# Patient Record
Sex: FEMALE | Race: BLACK OR AFRICAN AMERICAN | NOT HISPANIC OR LATINO | Employment: OTHER | URBAN - METROPOLITAN AREA
[De-identification: names, ages, dates, MRNs, and addresses within clinical notes are randomized per-mention and may not be internally consistent; named-entity substitution may affect disease eponyms.]

---

## 2017-02-01 ENCOUNTER — RADIANT APPOINTMENT (OUTPATIENT)
Dept: GENERAL RADIOLOGY | Facility: CLINIC | Age: 62
End: 2017-02-01
Attending: INTERNAL MEDICINE
Payer: COMMERCIAL

## 2017-02-01 ENCOUNTER — OFFICE VISIT (OUTPATIENT)
Dept: FAMILY MEDICINE | Facility: CLINIC | Age: 62
End: 2017-02-01
Payer: COMMERCIAL

## 2017-02-01 VITALS
OXYGEN SATURATION: 99 % | RESPIRATION RATE: 20 BRPM | SYSTOLIC BLOOD PRESSURE: 120 MMHG | TEMPERATURE: 98.5 F | WEIGHT: 177 LBS | BODY MASS INDEX: 31.36 KG/M2 | DIASTOLIC BLOOD PRESSURE: 66 MMHG | HEIGHT: 63 IN | HEART RATE: 62 BPM

## 2017-02-01 DIAGNOSIS — J40 BRONCHITIS: ICD-10-CM

## 2017-02-01 DIAGNOSIS — M25.562 PATELLOFEMORAL ARTHRALGIA OF LEFT KNEE: ICD-10-CM

## 2017-02-01 DIAGNOSIS — J40 BRONCHITIS: Primary | ICD-10-CM

## 2017-02-01 PROCEDURE — 99213 OFFICE O/P EST LOW 20 MIN: CPT | Performed by: INTERNAL MEDICINE

## 2017-02-01 PROCEDURE — 71020 XR CHEST 2 VW: CPT

## 2017-02-01 NOTE — MR AVS SNAPSHOT
"              After Visit Summary   2/1/2017    Nanci Levy    MRN: [id number]           Patient Information     Date Of Birth          1955        Visit Information        Provider Department      2/1/2017 3:00 PM Thomas Cormier MD [facility]        Today's Diagnoses     Bronchitis    -  1     Patellofemoral arthralgia of left knee            Follow-ups after your visit        Who to contact     If you have questions or need follow up information about today's clinic visit or your schedule please contact [facility] directly at [phone].  Normal or non-critical lab and imaging results will be communicated to you by MyChart, letter or phone within 4 business days after the clinic has received the results. If you do not hear from us within 7 days, please contact the clinic through MyChart or phone. If you have a critical or abnormal lab result, we will notify you by phone as soon as possible.  Submit refill requests through [employer] or call your pharmacy and they will forward the refill request to us. Please allow 3 business days for your refill to be completed.          Additional Information About Your Visit        Care EveryWhere ID     This is your Care EveryWhere ID. This could be used by other organizations to access your [place] medical records  [id number]        Your Vitals Were     Pulse Temperature Respirations    62 98.5  F (36.9  C) 20    Height BMI (Body Mass Index) Pulse Oximetry    5' 3\" (1.6 m) 31.36 kg/m2 99%    Last Period Breastfeeding?       10/29/1998 No        Blood Pressure from Last 3 Encounters:   02/01/17 120/66   10/26/16 125/80   10/20/16 120/74    Weight from Last 3 Encounters:   02/01/17 177 lb (80.287 kg)   10/26/16 185 lb (83.915 kg)   10/20/16 185 lb (83.915 kg)                 Today's Medication Changes          These changes are accurate as of: 2/1/17  6:17 PM.  If you have any questions, ask your " nurse or doctor.               Stop taking these medicines if you haven't already. Please contact your care team if you have questions.     naproxen 500 MG tablet   Commonly known as:  NAPROSYN   Stopped by:  Thomas Cormier MD                    Primary Care Provider Office Phone # Fax #    Thomas Cormier [phone]       [facility] [address] [facility] [address]        Thank you!     Thank you for choosing [facility]  for your care. Our goal is always to provide you with excellent care. Hearing back from our patients is one way we can continue to improve our services. Please take a few minutes to complete the written survey that you may receive in the mail after your visit with us. Thank you!             Your Updated Medication List - Protect others around you: Learn how to safely use, store and throw away your medicines at www.disposemymeds.org.          This list is accurate as of: 2/1/17  6:17 PM.  Always use your most recent med list.                   Brand Name Dispense Instructions for use    aspirin 81 MG tablet      Take 1 tablet by mouth daily       vitamin D 2000 UNITS tablet     100 tablet    Take 2,000 Units by mouth daily

## 2017-02-01 NOTE — PROGRESS NOTES
SUBJECTIVE:                                                    Nanci Levy is a 61 year old female who presents to clinic today for the following health issues:      Acute Illness   Acute illness concerns: cough  Onset: 1 month     Fever: no     Chills/Sweats: no     Headache (location?): no     Sinus Pressure:no    Conjunctivitis:  no    Ear Pain: no    Rhinorrhea: YES    Congestion: YES    Sore Throat: YES- occ.     Cough: YES    Wheeze: no     Decreased Appetite: YES    Nausea: no     Vomiting: no     Diarrhea:  no     Dysuria/Freq.: no     Fatigue/Achiness: YES    Sick/Strep Exposure: no      Therapies Tried and outcome: [place] and Nyquil prn- not too effective             Concerned by duration of sx; with some mucous production, and some chest soreness with coughing.       Sx wax and wane.                                Also, ongoing L knee pain.  PT was too expensive.     The sports med provider suggested a brace could be tried.                                             Took aleve for a while; has stopped.     Problem list and histories reviewed & adjusted, as indicated.      Current Outpatient Prescriptions   Medication Sig Dispense Refill     Cholecalciferol (VITAMIN D) 2000 UNITS tablet Take 2,000 Units by mouth daily 100 tablet 11     aspirin 81 MG tablet Take 1 tablet by mouth daily       lidocaine (XYLOCAINE) 5 % ointment Apply topically as needed for moderate pain 35 g 3     diclofenac (VOLTAREN) 1 % GEL Apply 4 grams to knees or 2 grams to hands four times daily using enclosed dosing card. 100 g prn     naproxen (NAPROSYN) 500 MG tablet Take 1 tablet (500 mg) by mouth 2 times daily (with meals) 60 tablet 1     No Known Allergies  BP Readings from Last 3 Encounters:   02/01/17 120/66   10/26/16 125/80   10/20/16 120/74    Wt Readings from Last 3 Encounters:   02/01/17 177 lb (80.287 kg)   10/26/16 185 lb (83.915 kg)   10/20/16 185 lb (83.915 kg)                    ROS: See above  "plus  CONSTITUTIONAL:POSITIVE  for fatigue and NEGATIVE  for sweats  ENT/MOUTH: NEGATIVE for sinus pressure  RESP:NEGATIVE for Hx asthma and wheezing    OBJECTIVE:                                                    /66 mmHg  Pulse 62  Temp(Src) 98.5  F (36.9  C)  Resp 20  Ht 5' 3\" (1.6 m)  Wt 177 lb (80.287 kg)  BMI 31.36 kg/m2  SpO2 99%  LMP 10/29/1998  Breastfeeding? No  Body mass index is 31.36 kg/(m^2).  GENERAL APPEARANCE: alert and no distress  HENT: nose and mouth without ulcers or lesions  RESP: lungs clear to auscultation - no rales, rhonchi or wheezes  CV: regular rates and rhythm    Diagnostic test results:  Recent Results (from the past 744 hour(s))   XR Chest 2 Views    Narrative    CHEST TWO VIEWS   2/1/2017 3:28 PM    HISTORY:  Bronchitis.    COMPARISON:  12/16/2009    FINDINGS:  The heart size is normal. No mediastinal pathology is seen.  The lungs are clear. The pulmonary vasculature is normal. No  pneumothorax or pleural effusion is seen.       Impression    IMPRESSION:  Unremarkable chest. I see no definite change since the  previous examination.     CATRACHITO PORTILLO MD          ASSESSMENT/PLAN:                                                        ICD-10-CM    1. Bronchitis J40 XR Chest 2 Views   2. Patellofemoral arthralgia of left knee M25.562        Antibiotics are not indicated.             Expect gradual improvement.  She will call orthopedics to get advice regarding a knee brace.  Follow up with Provider - lexus Cormier MD  [facility]  "

## 2017-02-01 NOTE — NURSING NOTE
"Chief Complaint   Patient presents with     URI       Initial /66 mmHg  Pulse 62  Temp(Src) 98.5  F (36.9  C)  Resp 20  Ht 5' 3\" (1.6 m)  Wt 177 lb (80.287 kg)  BMI 31.36 kg/m2  SpO2 99%  LMP 10/29/1998  Breastfeeding? No Estimated body mass index is 31.36 kg/(m^2) as calculated from the following:    Height as of this encounter: 5' 3\" (1.6 m).    Weight as of this encounter: 177 lb (80.287 kg).  BP completed using cuff size: mamadou Gamboa LPN  "

## 2017-03-30 ENCOUNTER — TELEPHONE (OUTPATIENT)
Dept: FAMILY MEDICINE | Facility: CLINIC | Age: 62
End: 2017-03-30

## 2017-03-30 NOTE — TELEPHONE ENCOUNTER
"I had never heard of the \"osmo patch\".      I learned on the internet that it is an over the counter, \"natural \" remedy and therefore would not be covered by most (all?) insurance plans.       She could certainly try it.         She could also see another orthopedist for a second opinion, and to consider a steroid injection.                   Please let the patient know.    "

## 2017-03-30 NOTE — TELEPHONE ENCOUNTER
"Patient would like PCP's opinion on use of osmo patch. She has tried PT for left knee pain with out relief. She\" can not continue to see them due to cost of PT\". Has seen ortho but not getting pain improvement. She has used knee brace but still has pain and knee swelling. She is now looking for pain relief alternatives. Asking if PCP would order osmo patch or give any other advise. Please review and advice.  "

## 2017-03-30 NOTE — TELEPHONE ENCOUNTER
Called patient to let her know the message below. She is going to check and see if her current insurance plan (she switched plans) covers and get back to us if she needs a referral from the primary to another orthopedic doc.   Cynthia Martinez RN  03/30/17  2:23 PM

## 2017-06-20 ENCOUNTER — RADIANT APPOINTMENT (OUTPATIENT)
Dept: MAMMOGRAPHY | Facility: CLINIC | Age: 62
End: 2017-06-20
Payer: COMMERCIAL

## 2017-06-20 DIAGNOSIS — Z12.31 VISIT FOR SCREENING MAMMOGRAM: ICD-10-CM

## 2017-06-20 PROCEDURE — G0202 SCR MAMMO BI INCL CAD: HCPCS | Mod: TC

## 2017-06-26 ENCOUNTER — OFFICE VISIT (OUTPATIENT)
Dept: FAMILY MEDICINE | Facility: CLINIC | Age: 62
End: 2017-06-26
Payer: COMMERCIAL

## 2017-06-26 VITALS
OXYGEN SATURATION: 99 % | WEIGHT: 174 LBS | HEART RATE: 60 BPM | BODY MASS INDEX: 30.83 KG/M2 | RESPIRATION RATE: 16 BRPM | TEMPERATURE: 97.1 F | HEIGHT: 63 IN | DIASTOLIC BLOOD PRESSURE: 72 MMHG | SYSTOLIC BLOOD PRESSURE: 116 MMHG

## 2017-06-26 DIAGNOSIS — G89.29 CHRONIC PAIN OF LEFT KNEE: ICD-10-CM

## 2017-06-26 DIAGNOSIS — E04.1 THYROID NODULE: ICD-10-CM

## 2017-06-26 DIAGNOSIS — Z00.00 ROUTINE GENERAL MEDICAL EXAMINATION AT A HEALTH CARE FACILITY: Primary | ICD-10-CM

## 2017-06-26 DIAGNOSIS — M25.562 CHRONIC PAIN OF LEFT KNEE: ICD-10-CM

## 2017-06-26 PROCEDURE — 82947 ASSAY GLUCOSE BLOOD QUANT: CPT | Performed by: FAMILY MEDICINE

## 2017-06-26 PROCEDURE — 84443 ASSAY THYROID STIM HORMONE: CPT | Performed by: FAMILY MEDICINE

## 2017-06-26 PROCEDURE — 99396 PREV VISIT EST AGE 40-64: CPT | Performed by: FAMILY MEDICINE

## 2017-06-26 PROCEDURE — 80061 LIPID PANEL: CPT | Performed by: FAMILY MEDICINE

## 2017-06-26 PROCEDURE — 36415 COLL VENOUS BLD VENIPUNCTURE: CPT | Performed by: FAMILY MEDICINE

## 2017-06-26 NOTE — MR AVS SNAPSHOT
After Visit Summary   6/26/2017    Nanci Levy    MRN: [id number]           Patient Information     Date Of Birth          1955        Visit Information        Provider Department      6/26/2017 9:20 AM Hawa Waddell MD [facility]        Today's Diagnoses     Routine general medical examination at a health care facility    -  1    Chronic pain of left knee          Care Instructions    1) You're knee pain unfortunately is not getting better with physical therapy!  This means you need to see a knee specialist - I referred you for this today. You can go back to see Dr. Nielsen, or Dr. Shandra Parish who is great (and not a surgeon), or the [facility] in [place].  I placed a referral for this.      Preventive Health Recommendations  Female Ages 50 - 64    Yearly exam: See your health care provider every year in order to  o Review health changes.   o Discuss preventive care.    o Review your medicines if your doctor has prescribed any.      Get a Pap test every three years (unless you have an abnormal result and your provider advises testing more often).    If you get Pap tests with HPV test, you only need to test every 5 years, unless you have an abnormal result.     You do not need a Pap test if your uterus was removed (hysterectomy) and you have not had cancer.    You should be tested each year for STDs (sexually transmitted diseases) if you're at risk.     Have a mammogram every 1 to 2 years.    Have a colonoscopy at age 50, or have a yearly FIT test (stool test). These exams screen for colon cancer.      Have a cholesterol test every 5 years, or more often if advised.    Have a diabetes test (fasting glucose) every three years. If you are at risk for diabetes, you should have this test more often.     If you are at risk for osteoporosis (brittle bone disease), think about having a bone density scan (DEXA).    Shots: Get a flu shot each year. Get  a tetanus shot every 10 years.    Nutrition:     Eat at least 5 servings of fruits and vegetables each day.    Eat whole-grain bread, whole-wheat pasta and brown rice instead of white grains and rice.    Talk to your provider about Calcium and Vitamin D.     Lifestyle    Exercise at least 150 minutes a week (30 minutes a day, 5 days a week). This will help you control your weight and prevent disease.    Limit alcohol to one drink per day.    No smoking.     Wear sunscreen to prevent skin cancer.     See your dentist every six months for an exam and cleaning.    See your eye doctor every 1 to 2 years.            Follow-ups after your visit        Additional Services     ORTHOPEDICS ADULT REFERRAL       Your provider has referred you to: [facility]: Sports Medicine [facility] [phone]  - Dr Shandra Parish or Dr. Nielsen.  OR [facility]    [email or web address]    Please be aware that coverage of these services is subject to the terms and limitations of your health insurance plan.  Call member services at your health plan with any benefit or coverage questions.      Please bring the following to your appointment:    >>   Any x-rays, CTs or MRIs which have been performed.  Contact the facility where they were done to arrange for  prior to your scheduled appointment.    >>   List of current medications   >>   This referral request   >>   Any documents/labs given to you for this referral                  Who to contact     If you have questions or need follow up information about today's clinic visit or your schedule please contact [facility] directly at [phone].  Normal or non-critical lab and imaging results will be communicated to you by MyChart, letter or phone within 4 business days after the clinic has received the results. If you do not hear from us within 7 days, please contact the clinic through MyChart or phone. If  "you have a critical or abnormal lab result, we will notify you by phone as soon as possible.  Submit refill requests through [employer] or call your pharmacy and they will forward the refill request to us. Please allow 3 business days for your refill to be completed.          Additional Information About Your Visit        [email or web address] Information     [employer] lets you send messages to your doctor, view your test results, renew your prescriptions, schedule appointments and more. To sign up, go to [email or web address] . Click on \"Log in\" on the left side of the screen, which will take you to the Welcome page. Then click on \"Sign up Now\" on the right side of the page.     You will be asked to enter the access code listed below, as well as some personal information. Please follow the directions to create your username and password.     Your access code is: [email or web address]  Expires: 2017  9:51 AM     Your access code will  in 90 days. If you need help or a new code, please call your [place] clinic or [phone].        Care EveryWhere ID     This is your Care EveryWhere ID. This could be used by other organizations to access your [place] medical records  [id number]        Your Vitals Were     Pulse Temperature Respirations Height Last Period Pulse Oximetry    60 97.1  F (36.2  C) (Tympanic) 16 5' 3\" (1.6 m) 10/29/1998 99%    BMI (Body Mass Index)                   30.82 kg/m2            Blood Pressure from Last 3 Encounters:   17 116/72   17 120/66   10/26/16 125/80    Weight from Last 3 Encounters:   17 174 lb (78.9 kg)   17 177 lb (80.3 kg)   10/26/16 185 lb (83.9 kg)              We Performed the Following     ORTHOPEDICS ADULT REFERRAL        Primary Care Provider Office Phone # Fax #    Thomas Cormier [phone]       [facility] [address]  [facility] [address]        Equal Access to Services     MIKE CLARK AH: Juanita soria " Grace, myles watsonadaha, tianata katata fuentes, saul onealin hayaan anniabienvenido zanahalima laThiduane yulisa. So [facility] [phone].    ATENCIÓN: Si raulla lima, tiene a collier disposición servicios gratuitos de asistencia lingüística. Rachid al [phone].    We comply with applicable federal civil rights laws and [place] laws. We do not discriminate on the basis of race, color, national origin, age, disability sex, sexual orientation or gender identity.            Thank you!     Thank you for choosing [facility]  for your care. Our goal is always to provide you with excellent care. Hearing back from our patients is one way we can continue to improve our services. Please take a few minutes to complete the written survey that you may receive in the mail after your visit with us. Thank you!             Your Updated Medication List - Protect others around you: Learn how to safely use, store and throw away your medicines at www.disposemymeds.org.          This list is accurate as of: 6/26/17  9:51 AM.  Always use your most recent med list.                   Brand Name Dispense Instructions for use Diagnosis    aspirin 81 MG tablet      Take 1 tablet by mouth daily        vitamin D 2000 UNITS tablet     100 tablet    Take 2,000 Units by mouth daily    Vitamin D insufficiency

## 2017-06-26 NOTE — LETTER
[facility]  [address]  [address]  [facility] [place] [id number] [phone]                                                                                                           Nanci Myra Levy  [address]  [place] [address]    June 27, 2017      Dear Nanci,    It was nice to see you recently!  I wanted to let you know your recent test results - details of the results can be found below. Briefly:     1. Your lab results look great; everything is normal.  Your cholesterol looks much better than last year, your glucose is nice and low and normal, and your thyroid levels are stable.  All good news!     Let me know if you have any questions about this, or other concerns.     Best,   Hawa Waddell MD   Family Medicine   [facility]   [phone]     Results for orders placed or performed in visit on 06/26/17   -TSH with free T4 reflex        Result                                            Value                         Ref Range                        TSH                                               0.84                          0.40 - 4.00 mU/L           -Lipid panel reflex to direct LDL        Result                                            Value                         Ref Range                        Cholesterol                                       182                           <200 mg/dL                      Triglycerides                                     68                            <150 mg/dL                      HDL Cholesterol                                   63                            >49 mg/dL                        LDL Cholesterol Calculated                        105 (H)                       <100 mg/dL                      Non HDL Cholesterol                               119                           <130 mg/dL                 -Glucose        Result                                             Value                         Ref Range                        Glucose                                           95                            70 - 99 mg/dL

## 2017-06-26 NOTE — PROGRESS NOTES
SUBJECTIVE:     CC: Nanci Levy is an 62 year old woman who presents for preventive health visit.     Healthy Habits:    Do you get at least three servings of calcium containing foods daily (dairy, green leafy vegetables, etc.)? no, taking calcium and/or vitamin D supplement: vitamin d rarely    Amount of exercise or daily activities, outside of work: 3 day(s) per week    Problems taking medications regularly No    Medication side effects: No    Have you had an eye exam in the past two years? yes    Do you see a dentist twice per year? yes    Do you have sleep apnea, excessive snoring or daytime drowsiness?no        Left knee, been hurting for a year and progressively worse.  Had xray which showed patello-femoral arthritis.  Did physical therapy - helps in the moment but then pain comes back.  Can she go to knee specialist?    Also, has thyroid nodules they've been watching.  She feels they are growing; can she get ultrasound?      Today's PHQ-2 Score:   PHQ-2 ( 1999 [employer]) 6/26/2017 2/1/2017   Q1: Little interest or pleasure in doing things 0 0   Q2: Feeling down, depressed or hopeless 0 0   PHQ-2 Score 0 0       Abuse: Current or Past(Physical, Sexual or Emotional)- No  Do you feel safe in your environment - Yes    Social History   Substance Use Topics     Smoking status: Never Smoker     Smokeless tobacco: Never Used     Alcohol use No     The patient does not drink >3 drinks per day nor >7 drinks per week.    Recent Labs   Lab Test  12/28/15   1358  12/22/14   1707  11/25/13   0833   CHOL  210*  194  199   HDL  61  58  54   LDL  130*  115  132*   TRIG  93  105  65   CHOLHDLRATIO   --   3.3  3.7   NHDL  149*   --    --        Reviewed orders with patient.  Reviewed health maintenance and updated orders accordingly - No    Mammo Decision Support:  Patient over age 50, mutual decision to screen reflected in health maintenance.    Pertinent mammograms are reviewed under the imaging tab.  History of  "abnormal Pap smear:   YES - updated in Problem List and Health Maintenance accordingly  Last 3 Pap Results:   PAP (no units)   Date Value   12/28/2015 NIL   12/22/2014 NIL   10/29/2012 NIL       Reviewed and updated as needed this visit by clinical staff  Tobacco  Allergies  Meds  Med Hx  Surg Hx  Fam Hx  Soc Hx        Reviewed and updated as needed this visit by Provider            ROS:  C: NEGATIVE for fever, chills, change in weight  I: NEGATIVE for worrisome rashes, moles or lesions  E: NEGATIVE for vision changes or irritation  ENT: NEGATIVE for ear, mouth and throat problems  R: NEGATIVE for significant cough or SOB  B: NEGATIVE for masses, tenderness or discharge  CV: NEGATIVE for chest pain, palpitations or peripheral edema  GI: NEGATIVE for nausea, abdominal pain, heartburn, or change in bowel habits  : NEGATIVE for unusual urinary or vaginal symptoms. No vaginal bleeding.  M: NEGATIVE for significant arthralgias or myalgia  N: NEGATIVE for weakness, dizziness or paresthesias  P: NEGATIVE for changes in mood or affect     Problem list, Medication list, Allergies, and Medical/Social/Surgical histories reviewed in [facility] and updated as appropriate.  Labs reviewed in EPIC  OBJECTIVE:     /72  Pulse 60  Temp 97.1  F (36.2  C) (Tympanic)  Resp 16  Ht 5' 3\" (1.6 m)  Wt 174 lb (78.9 kg)  LMP 10/29/1998  SpO2 99%  BMI 30.82 kg/m2  EXAM:  GENERAL APPEARANCE: healthy, alert and no distress  EYES: Eyes grossly normal to inspection, PERRL and conjunctivae and sclerae normal  HENT: ear canals and TM's normal, nose and mouth without ulcers or lesions, oropharynx clear and oral mucous membranes moist  NECK: no adenopathy, no asymmetry, masses, or scars and thyroid normal to palpation  RESP: lungs clear to auscultation - no rales, rhonchi or wheezes  BREAST: normal without masses, tenderness or nipple discharge and no palpable axillary masses or adenopathy  CV: regular rate and rhythm, normal S1 S2, no " S3 or S4, no murmur, click or rub, no peripheral edema and peripheral pulses strong  ABDOMEN: soft, nontender, no hepatosplenomegaly, no masses and bowel sounds normal   (female): normal female external genitalia, normal urethral meatus, vaginal mucosal atrophy noted, normal cervix, adnexae, and uterus without masses or abnormal discharge  MS: no musculoskeletal defects are noted and gait is age appropriate without ataxia  SKIN: no suspicious lesions or rashes  NEURO: Normal strength and tone, sensory exam grossly normal, mentation intact and speech normal  PSYCH: mentation appears normal and affect normal/bright    ASSESSMENT/PLAN:     Nanci was seen today for physical.    Diagnoses and all orders for this visit:    Routine general medical examination at a health care facility  -     TSH with free T4 reflex  -     Lipid panel reflex to direct LDL  -     Glucose    Chronic pain of left knee  Comments:  tried PT x 12 months, no improvement and now daily constant pain.  Should see sports med/ortho again.  Referred.  Orders:  -     ORTHOPEDICS ADULT REFERRAL    Thyroid nodule  Comments:  thyroid US in 2014 showed 2 solid nodules smaller in size.  Nanci thinks more noticable; recheck US today.  Orders:  -     US Thyroid; Future          Left knee, been hurting for a year and progressively worse.  Had xray which showed patello-femoral arthritis.  Did physical therapy - helps in the moment but then pain comes back.  Plan:  Referred for sports med/knee clinic.  Should see doctor again as knee pain not improving with physical therapy.    Also, has thyroid nodules they've been watching.  She feels they are growing; can she get ultrasound?  Plan:  Get thyroid ultrasound - last in 2014 showed multinodular goiter with 2 solid nodules - recheck.      COUNSELING:   Reviewed preventive health counseling, as reflected in patient instructions         reports that she has never smoked. She has never used smokeless  "tobacco.    Estimated body mass index is 30.82 kg/(m^2) as calculated from the following:    Height as of this encounter: 5' 3\" (1.6 m).    Weight as of this encounter: 174 lb (78.9 kg).   Weight management plan: Discussed healthy diet and exercise guidelines and patient will follow up in 12 months in clinic to re-evaluate.    Counseling Resources:  ATP IV Guidelines  Pooled Cohorts Equation Calculator  Breast Cancer Risk Calculator  FRAX Risk Assessment  ICSI Preventive Guidelines  Dietary Guidelines for Americans, 2010  USDA's MyPlate  ASA Prophylaxis  Lung CA Screening    Hawa Waddell MD  [facility]  "

## 2017-06-26 NOTE — PATIENT INSTRUCTIONS
1) You're knee pain unfortunately is not getting better with physical therapy!  This means you need to see a knee specialist - I referred you for this today. You can go back to see Dr. Nielsen, or Dr. Shandra Parish who is great (and not a surgeon), or the [facility] in [place].  I placed a referral for this.      Preventive Health Recommendations  Female Ages 50 - 64    Yearly exam: See your health care provider every year in order to  o Review health changes.   o Discuss preventive care.    o Review your medicines if your doctor has prescribed any.      Get a Pap test every three years (unless you have an abnormal result and your provider advises testing more often).    If you get Pap tests with HPV test, you only need to test every 5 years, unless you have an abnormal result.     You do not need a Pap test if your uterus was removed (hysterectomy) and you have not had cancer.    You should be tested each year for STDs (sexually transmitted diseases) if you're at risk.     Have a mammogram every 1 to 2 years.    Have a colonoscopy at age 50, or have a yearly FIT test (stool test). These exams screen for colon cancer.      Have a cholesterol test every 5 years, or more often if advised.    Have a diabetes test (fasting glucose) every three years. If you are at risk for diabetes, you should have this test more often.     If you are at risk for osteoporosis (brittle bone disease), think about having a bone density scan (DEXA).    Shots: Get a flu shot each year. Get a tetanus shot every 10 years.    Nutrition:     Eat at least 5 servings of fruits and vegetables each day.    Eat whole-grain bread, whole-wheat pasta and brown rice instead of white grains and rice.    Talk to your provider about Calcium and Vitamin D.     Lifestyle    Exercise at least 150 minutes a week (30 minutes a day, 5 days a week). This will help you control your weight and prevent disease.    Limit alcohol to one drink per day.    No smoking.      Wear sunscreen to prevent skin cancer.     See your dentist every six months for an exam and cleaning.    See your eye doctor every 1 to 2 years.

## 2017-06-26 NOTE — NURSING NOTE
"Chief Complaint   Patient presents with     Physical       Initial /72  Pulse 60  Temp 97.1  F (36.2  C) (Tympanic)  Resp 16  Ht 5' 3\" (1.6 m)  Wt 174 lb (78.9 kg)  LMP 10/29/1998  SpO2 99%  BMI 30.82 kg/m2 Estimated body mass index is 30.82 kg/(m^2) as calculated from the following:    Height as of this encounter: 5' 3\" (1.6 m).    Weight as of this encounter: 174 lb (78.9 kg).  Medication Reconciliation: complete   Rowena Larios CMA    "

## 2017-06-27 LAB
CHOLEST SERPL-MCNC: 182 MG/DL
GLUCOSE SERPL-MCNC: 95 MG/DL (ref 70–99)
HDLC SERPL-MCNC: 63 MG/DL
LDLC SERPL CALC-MCNC: 105 MG/DL
NONHDLC SERPL-MCNC: 119 MG/DL
TRIGL SERPL-MCNC: 68 MG/DL
TSH SERPL DL<=0.005 MIU/L-ACNC: 0.84 MU/L (ref 0.4–4)

## 2017-06-27 NOTE — PROGRESS NOTES
Hi Team 3:  Please sent a lab result with the following note.  Thank you!    Dear Nanci,    It was nice to see you recently!  I wanted to let you know your recent test results - details of the results can be found below. Briefly:    1. Your lab results look great; everything is normal.  Your cholesterol looks much better than last year, your glucose is nice and low and normal, and your thyroid levels are stable.  All good news!    Let me know if you have any questions about this, or other concerns.    Best,  Hawa Waddell MD  Family Medicine  [facility]  [phone]         Results for orders placed or performed in visit on 06/26/17  -TSH with free T4 reflex       Result                                            Value                         Ref Range                       TSH                                               0.84                          0.40 - 4.00 mU/L           -Lipid panel reflex to direct LDL       Result                                            Value                         Ref Range                       Cholesterol                                       182                           <200 mg/dL                      Triglycerides                                     68                            <150 mg/dL                      HDL Cholesterol                                   63                            >49 mg/dL                       LDL Cholesterol Calculated                        105 (H)                       <100 mg/dL                      Non HDL Cholesterol                               119                           <130 mg/dL                 -Glucose       Result                                            Value                         Ref Range                       Glucose                                           95                            70 - 99 mg/dL

## 2018-02-06 ENCOUNTER — OFFICE VISIT (OUTPATIENT)
Dept: FAMILY MEDICINE | Facility: CLINIC | Age: 63
End: 2018-02-06
Payer: COMMERCIAL

## 2018-02-06 ENCOUNTER — HOSPITAL ENCOUNTER (OUTPATIENT)
Dept: MRI IMAGING | Facility: CLINIC | Age: 63
Discharge: HOME | End: 2018-02-06
Attending: INTERNAL MEDICINE | Admitting: INTERNAL MEDICINE
Payer: COMMERCIAL

## 2018-02-06 VITALS
RESPIRATION RATE: 20 BRPM | TEMPERATURE: 97.3 F | BODY MASS INDEX: 33.31 KG/M2 | HEART RATE: 80 BPM | HEIGHT: 63 IN | WEIGHT: 188 LBS | OXYGEN SATURATION: 100 % | DIASTOLIC BLOOD PRESSURE: 68 MMHG | SYSTOLIC BLOOD PRESSURE: 126 MMHG

## 2018-02-06 DIAGNOSIS — J06.9 UPPER RESPIRATORY TRACT INFECTION, UNSPECIFIED TYPE: ICD-10-CM

## 2018-02-06 DIAGNOSIS — R20.0 LEFT FACIAL NUMBNESS: ICD-10-CM

## 2018-02-06 DIAGNOSIS — R20.0 LEFT FACIAL NUMBNESS: Primary | ICD-10-CM

## 2018-02-06 DIAGNOSIS — R20.2 PARESTHESIAS: ICD-10-CM

## 2018-02-06 PROCEDURE — 25000128 H RX IP 250 OP 636: Performed by: INTERNAL MEDICINE

## 2018-02-06 PROCEDURE — 99214 OFFICE O/P EST MOD 30 MIN: CPT | Performed by: INTERNAL MEDICINE

## 2018-02-06 PROCEDURE — A9585 GADOBUTROL INJECTION: HCPCS | Performed by: INTERNAL MEDICINE

## 2018-02-06 PROCEDURE — 70553 MRI BRAIN STEM W/O & W/DYE: CPT

## 2018-02-06 RX ORDER — GADOBUTROL 604.72 MG/ML
8 INJECTION INTRAVENOUS ONCE
Status: COMPLETED | OUTPATIENT
Start: 2018-02-06 | End: 2018-02-06

## 2018-02-06 RX ADMIN — GADOBUTROL 8 ML: 604.72 INJECTION INTRAVENOUS at 20:15

## 2018-02-06 NOTE — PATIENT INSTRUCTIONS
Let's plan an MRI of your brain today.                                        The phone number is  [phone]

## 2018-02-06 NOTE — PROGRESS NOTES
"  SUBJECTIVE:   Nanci Levy is a 62 year old female who presents to clinic today for the following health issues:      Acute Illness   Acute illness concerns: sinus issue  Onset: past 24 hours    Fever: no     Chills/Sweats: no     Headache (location?): YES    Sinus Pressure:YES    Conjunctivitis:  no    Ear Pain: no    Rhinorrhea: no     Congestion: no     Sore Throat: YES     Cough: yes    Wheeze: no     Decreased Appetite: no     Nausea: no     Vomiting: no     Diarrhea:  no     Dysuria/Freq.: no     Fatigue/Achiness: no     Sick/Strep Exposure: no      Therapies Tried and outcome: nothing                 Recent URI sx; now the L side of her face feels \"tight\".               Also, her left side (arm and leg) feel \" different\".                 She has difficulty being real specific with her left sided symptoms.     She is not really describing weakness, just that she feels \"different\" on her left side.                    She has a variety of low-grade URI symptoms.  She takes an aspirin tablet daily.                Problem list and histories reviewed & adjusted, as indicated.      Patient Active Problem List    Diagnosis Date Noted     Cervical high risk HPV (human papillomavirus) test positive 12/01/2014     Priority: High     12/2014: nil pap, + HPV (not 16 or 18). Plan cotest pap & HPV in 1 year.  Tracking started.  12/28/15: NIL pap, neg HPV. Plan cotest pap & HPV in 3 years.           Chronic pain of left knee 06/26/2017     Priority: Medium     tried PT x 12 months, no improvement and now daily constant pain.  Should see sports med/ortho again.  Referred.       Thyroid nodule 06/26/2017     Priority: Medium     thyroid US in 2014 showed 2 solid nodules smaller in size.  Nanci thinks more noticable; recheck US today.       Bursitis, knee, left 03/31/2016     Priority: Medium     X-ray indicates patellofemoral arthritis       Paresthesia of left foot 03/31/2016     Priority: Medium     " "Hyperlipidemia with target LDL less than 130 12/08/2014     Priority: Medium     Diagnosis updated by automated process. Provider to review and confirm.       Sciatica 11/25/2013     Priority: Medium     Left side; 1/13; also see ortho note 2006; had an L5-S1 osteophyte/disc complex with moderate to severe foraminal stenosis L        Chest wall pain      Priority: Medium     see cardiology note; MRI heart nml                 CXR 2/11 normal       Multinodular goiter (nontoxic)      Priority: Medium     bx non-Dx; watery colloid ; not enough cells.         Had a 1 cm nodule in each lobe; bx in 2009; \"in all likelihood benign\" ;    US in 2/14; nodules are smaller       Vitamin D deficiency 10/29/2012     Priority: Medium     17 in 11/13; resume vit D  Problem list name updated by automated process. Provider to review       Family history of diabetes mellitus 10/29/2012     Priority: Medium     Arthralgia of temporomandibular joint 10/29/2012     Priority: Medium     left       Anemia 10/29/2012     Priority: Medium     12.7 with ferritin 41 in 10/12; add Fe with each blood donation;                           12.1 and 77 in 11/13, 12.1 and 56 in 12/14  Problem list name updated by automated process. Provider to review       Hearing loss 10/29/2012     Priority: Medium     left  Problem list name updated by automated process. Provider to review       ACP (advance care planning) 09/02/2015     Priority: Low     Advance Care Planning 9/2/2015: ACP Review and Resources Provided:  Reviewed chart for advance care plan.  Nanci Levy has no plan or code status on file. Discussed available resources and provided with information. . Added by Nava Wilson               Preventive measure 10/30/2012     Priority: Low     APRIMA DATA BASE UNDER THE 1/21/13 NOTE  Colonoscopy 7/03, 2/14, neg, recheck in 10 yrs;                           Mammogram 12/13;2/15 ,6/17                          Pap 10/12;12/14                    " "           plan DXA age 63       Past Surgical History:   Procedure Laterality Date     HYSTERECTOMY  08/1997    supra cervical abdominal hysterectomy; ovaries remain     Social History     Social History     Marital status: Single     Spouse name: N/A     Number of children: N/A     Years of education: N/A     Occupational History     Not on file.     Social History Main Topics     Smoking status: Never Smoker     Smokeless tobacco: Never Used     Alcohol use No     Drug use: No     Sexual activity: Yes     Partners: Male     Other Topics Concern     Parent/Sibling W/ Cabg, Mi Or Angioplasty Before 65f 55m? Yes     Social History Narrative       Current Outpatient Prescriptions   Medication Sig Dispense Refill     Cholecalciferol (VITAMIN D) 2000 UNITS tablet Take 2,000 Units by mouth daily 100 tablet 11     aspirin 81 MG tablet Take 1 tablet by mouth daily       No Known Allergies  BP Readings from Last 3 Encounters:   02/06/18 126/68   06/26/17 116/72   02/01/17 120/66    Wt Readings from Last 3 Encounters:   02/06/18 188 lb (85.3 kg)   06/26/17 174 lb (78.9 kg)   02/01/17 177 lb (80.3 kg)                    Reviewed and updated as needed this visit by clinical staff       Reviewed and updated as needed this visit by Provider         ROS:  CONSTITUTIONAL:NEGATIVE for fever, chills, change in weight  EYES: NEGATIVE for vision changes or irritation  ENT/MOUTH: POSITIVE for postnasal drainage and snoring  RESP:POSITIVE for cough-non productive  CV: NEGATIVE for chest pain, palpitations or peripheral edema  MUSCULOSKELETAL: POSITIVE  for arthralgias left knee, chronic  NEURO: POSITIVE for left face and left arm and left leg all of which feel \"different\" to her and NEGATIVE for dizziness/lightheadedness, dysarthria, dysphagia, HX CVA, HX TIA, gait disturbance, memory problems, weakness  and visual change     OBJECTIVE:                                                    /68 (BP Location: Right arm, Patient " "Position: Chair, Cuff Size: Adult Large)  Pulse 80  Temp 97.3  F (36.3  C)  Resp 20  Ht 5' 3\" (1.6 m)  Wt 188 lb (85.3 kg)  LMP 10/29/1998  SpO2 100%  Breastfeeding? No  BMI 33.3 kg/m2  Body mass index is 33.3 kg/(m^2).  GENERAL APPEARANCE: alert and no distress  HENT: ear canals and TM's normal and nose and mouth without ulcers or lesions  NECK: no adenopathy, no asymmetry, masses, or scars and thyroid nodule   RESP: lungs clear to auscultation - no rales, rhonchi or wheezes  CV: regular rates and rhythm, normal S1 S2, no S3 or S4 and no murmur, click or rub  NEURO: Normal strength and tone, mentation intact, speech normal, DTR symmetrically normal in upper and lower extremities, gait normal  and cranial nerves 2-12 intact    Diagnostic test results:  none      ASSESSMENT/PLAN:                                                        ICD-10-CM    1. Left facial numbness R20.0 MR Brain w/o & w Contrast   2. Paresthesias R20.2 MR Brain w/o & w Contrast    left side   3. Upper respiratory tract infection, unspecified type J06.9        Summary and implications:  I cannot explain her left-sided symptoms.           She has had a URI, but those symptoms have been minimal.                    I cannot rule out a possible subtle stroke.  MRI brain advised.  Patient Instructions   Let's plan an MRI of your brain today.                                        The phone number is  [phone]      Thomas Cormier MD  [facility]                      Recent Results (from the past 24 hour(s))   MR Brain w/o & w Contrast    Narrative    MRI BRAIN WITHOUT AND WITH CONTRAST  2/6/2018 8:11 PM    HISTORY:  Subtle right hemispheric symptoms. Left facial numbness.  Paresthesias.    TECHNIQUE:  Multiplanar, multisequence MRI of the brain without and  with 8 mL Gadavist.    COMPARISON: None.    FINDINGS:  There are a few tiny foci of T2 hyperintensity in the  subcortical region of each cerebral " hemisphere, likely minimal chronic  small vessel ischemic change.  There is no evidence of hemorrhage,  mass, acute infarct, or anomaly.  There are no gadolinium enhancing  lesions.    The facial structures appear normal. The arteries at the base of the  brain and the dural venous sinuses appear patent.       Impression    IMPRESSION:  1. Minimal nonspecific chronic white matter disease.  2. Nothing acute.      ALVELL GRACE MD                   Phone:  Sx are better.     RTP

## 2018-02-06 NOTE — MR AVS SNAPSHOT
"              After Visit Summary   2/6/2018    Nanci Levy    MRN: [id number]           Patient Information     Date Of Birth          1955        Visit Information        Provider Department      2/6/2018 8:00 AM Thomas Cormier MD [facility]        Today's Diagnoses     Left facial numbness    -  1    Paresthesias          Care Instructions    Let's plan an MRI of your brain today.                                        The phone number is  [phone]          Follow-ups after your visit        Future tests that were ordered for you today     Open Future Orders        Priority Expected Expires Ordered    MR Brain w/o & w Contrast Routine  2/6/2019 2/6/2018            Who to contact     If you have questions or need follow up information about today's clinic visit or your schedule please contact [facility] directly at [phone].  Normal or non-critical lab and imaging results will be communicated to you by MyChart, letter or phone within 4 business days after the clinic has received the results. If you do not hear from us within 7 days, please contact the clinic through MyChart or phone. If you have a critical or abnormal lab result, we will notify you by phone as soon as possible.  Submit refill requests through [employer] or call your pharmacy and they will forward the refill request to us. Please allow 3 business days for your refill to be completed.          Additional Information About Your Visit        MyChart Information     [employer] lets you send messages to your doctor, view your test results, renew your prescriptions, schedule appointments and more. To sign up, go to [email or web address] . Click on \"Log in\" on the left side of the screen, which will take you to the Welcome page. Then click on \"Sign up Now\" on the right side of the page.     You will be asked to enter the access code listed below, as well as some personal " "information. Please follow the directions to create your username and password.     Your access code is: [email or web address]  Expires: 2018  8:42 AM     Your access code will  in 90 days. If you need help or a new code, please call your [facility] clinic or [phone].        Care EveryWhere ID     This is your Care EveryWhere ID. This could be used by other organizations to access your [facility] medical records  [id number]        Your Vitals Were     Pulse Temperature Respirations Height Last Period Pulse Oximetry    80 97.3  F (36.3  C) 20 5' 3\" (1.6 m) 10/29/1998 100%    Breastfeeding? BMI (Body Mass Index)                No 33.3 kg/m2           Blood Pressure from Last 3 Encounters:   18 126/68   17 116/72   17 120/66    Weight from Last 3 Encounters:   18 188 lb (85.3 kg)   17 174 lb (78.9 kg)   17 177 lb (80.3 kg)               Primary Care Provider Office Phone # Fax #    Thomas Cormier [phone]       [address] [facility] [address]        Equal Access to Services     MIKE CLARK AH: Hadii abe ku hadasho Soomaali, waaxda luqadaha, qaybta kaalmada adeegyada, waxay idiin haybettyn johnny patel . So [facility] [phone].    ATENCIÓN: Si habla español, tiene a collier disposición servicios gratuitos de asistencia lingüística. Llame al [phone].    We comply with applicable federal civil rights laws and [place] laws. We do not discriminate on the basis of race, color, national origin, age, disability, sex, sexual orientation, or gender identity.            Thank you!     Thank you for choosing [facility]  for your care. Our goal is always to provide you with excellent care. Hearing back from our patients is one way we can continue to improve our services. Please take a few minutes to complete the written survey that you may receive in the mail after your visit with us. Thank you!             Your Updated " Medication List - Protect others around you: Learn how to safely use, store and throw away your medicines at www.disposemymeds.org.          This list is accurate as of 2/6/18  8:42 AM.  Always use your most recent med list.                   Brand Name Dispense Instructions for use Diagnosis    aspirin 81 MG tablet      Take 1 tablet by mouth daily        vitamin D 2000 UNITS tablet     100 tablet    Take 2,000 Units by mouth daily    Vitamin D insufficiency

## 2018-02-06 NOTE — NURSING NOTE
"Chief Complaint   Patient presents with     Sinus Problem       Initial /68 (BP Location: Right arm, Patient Position: Chair, Cuff Size: Adult Large)  Pulse 80  Temp 97.3  F (36.3  C)  Resp 20  Ht 5' 3\" (1.6 m)  Wt 188 lb (85.3 kg)  LMP 10/29/1998  SpO2 100%  Breastfeeding? No  BMI 33.3 kg/m2 Estimated body mass index is 33.3 kg/(m^2) as calculated from the following:    Height as of this encounter: 5' 3\" (1.6 m).    Weight as of this encounter: 188 lb (85.3 kg).  Medication Reconciliation: complete   Emperatriz Gamboa LPN  "

## 2018-09-10 ENCOUNTER — OFFICE VISIT (OUTPATIENT)
Dept: FAMILY MEDICINE | Facility: CLINIC | Age: 63
End: 2018-09-10
Payer: COMMERCIAL

## 2018-09-10 VITALS
HEIGHT: 65 IN | BODY MASS INDEX: 32.49 KG/M2 | TEMPERATURE: 98.5 F | OXYGEN SATURATION: 99 % | SYSTOLIC BLOOD PRESSURE: 120 MMHG | DIASTOLIC BLOOD PRESSURE: 62 MMHG | HEART RATE: 64 BPM | RESPIRATION RATE: 20 BRPM | WEIGHT: 195 LBS

## 2018-09-10 DIAGNOSIS — R60.0 BILATERAL LEG EDEMA: ICD-10-CM

## 2018-09-10 DIAGNOSIS — Z00.00 ROUTINE GENERAL MEDICAL EXAMINATION AT A HEALTH CARE FACILITY: Primary | ICD-10-CM

## 2018-09-10 DIAGNOSIS — Z12.4 SCREENING FOR MALIGNANT NEOPLASM OF CERVIX: ICD-10-CM

## 2018-09-10 DIAGNOSIS — E78.5 HYPERLIPIDEMIA WITH TARGET LDL LESS THAN 130: ICD-10-CM

## 2018-09-10 DIAGNOSIS — Z83.3 FAMILY HISTORY OF DIABETES MELLITUS: ICD-10-CM

## 2018-09-10 DIAGNOSIS — E04.2 MULTINODULAR GOITER (NONTOXIC): ICD-10-CM

## 2018-09-10 DIAGNOSIS — D64.9 ANEMIA, UNSPECIFIED TYPE: ICD-10-CM

## 2018-09-10 DIAGNOSIS — Z23 NEED FOR PROPHYLACTIC VACCINATION AGAINST STREPTOCOCCUS PNEUMONIAE (PNEUMOCOCCUS): ICD-10-CM

## 2018-09-10 DIAGNOSIS — E55.9 VITAMIN D DEFICIENCY: ICD-10-CM

## 2018-09-10 DIAGNOSIS — Z78.0 ASYMPTOMATIC POSTMENOPAUSAL STATUS: ICD-10-CM

## 2018-09-10 LAB
BASOPHILS # BLD AUTO: 0 10E9/L (ref 0–0.2)
BASOPHILS NFR BLD AUTO: 0.3 %
DIFFERENTIAL METHOD BLD: NORMAL
EOSINOPHIL # BLD AUTO: 0.2 10E9/L (ref 0–0.7)
EOSINOPHIL NFR BLD AUTO: 2.3 %
ERYTHROCYTE [DISTWIDTH] IN BLOOD BY AUTOMATED COUNT: 12.8 % (ref 10–15)
HBA1C MFR BLD: 5.6 % (ref 0–5.6)
HCT VFR BLD AUTO: 39.1 % (ref 35–47)
HGB BLD-MCNC: 12.5 G/DL (ref 11.7–15.7)
LYMPHOCYTES # BLD AUTO: 2.6 10E9/L (ref 0.8–5.3)
LYMPHOCYTES NFR BLD AUTO: 36.4 %
MCH RBC QN AUTO: 30.9 PG (ref 26.5–33)
MCHC RBC AUTO-ENTMCNC: 32 G/DL (ref 31.5–36.5)
MCV RBC AUTO: 97 FL (ref 78–100)
MONOCYTES # BLD AUTO: 0.6 10E9/L (ref 0–1.3)
MONOCYTES NFR BLD AUTO: 8.3 %
NEUTROPHILS # BLD AUTO: 3.8 10E9/L (ref 1.6–8.3)
NEUTROPHILS NFR BLD AUTO: 52.7 %
PLATELET # BLD AUTO: 232 10E9/L (ref 150–450)
RBC # BLD AUTO: 4.05 10E12/L (ref 3.8–5.2)
WBC # BLD AUTO: 7.1 10E9/L (ref 4–11)

## 2018-09-10 PROCEDURE — 87624 HPV HI-RISK TYP POOLED RSLT: CPT | Performed by: INTERNAL MEDICINE

## 2018-09-10 PROCEDURE — 82728 ASSAY OF FERRITIN: CPT | Performed by: INTERNAL MEDICINE

## 2018-09-10 PROCEDURE — G0402 INITIAL PREVENTIVE EXAM: HCPCS | Performed by: INTERNAL MEDICINE

## 2018-09-10 PROCEDURE — 80061 LIPID PANEL: CPT | Performed by: INTERNAL MEDICINE

## 2018-09-10 PROCEDURE — 36415 COLL VENOUS BLD VENIPUNCTURE: CPT | Performed by: INTERNAL MEDICINE

## 2018-09-10 PROCEDURE — G0145 SCR C/V CYTO,THINLAYER,RESCR: HCPCS | Performed by: INTERNAL MEDICINE

## 2018-09-10 PROCEDURE — G0009 ADMIN PNEUMOCOCCAL VACCINE: HCPCS | Performed by: INTERNAL MEDICINE

## 2018-09-10 PROCEDURE — 80053 COMPREHEN METABOLIC PANEL: CPT | Performed by: INTERNAL MEDICINE

## 2018-09-10 PROCEDURE — 90670 PCV13 VACCINE IM: CPT | Performed by: INTERNAL MEDICINE

## 2018-09-10 PROCEDURE — 83036 HEMOGLOBIN GLYCOSYLATED A1C: CPT | Performed by: INTERNAL MEDICINE

## 2018-09-10 PROCEDURE — 84443 ASSAY THYROID STIM HORMONE: CPT | Performed by: INTERNAL MEDICINE

## 2018-09-10 PROCEDURE — 82306 VITAMIN D 25 HYDROXY: CPT | Performed by: INTERNAL MEDICINE

## 2018-09-10 PROCEDURE — 85025 COMPLETE CBC W/AUTO DIFF WBC: CPT | Performed by: INTERNAL MEDICINE

## 2018-09-10 ASSESSMENT — ACTIVITIES OF DAILY LIVING (ADL)
CURRENT_FUNCTION: NO ASSISTANCE NEEDED
I_NEED_ASSISTANCE_FOR_THE_FOLLOWING_DAILY_ACTIVITIES:: NO ASSISTANCE IS NEEDED

## 2018-09-10 NOTE — MR AVS SNAPSHOT
After Visit Summary   9/10/2018    Nanci Levy    MRN: [id number]           Patient Information     Date Of Birth          1955        Visit Information        Provider Department      9/10/2018 4:00 PM Thomas Cormier MD [facility]        Today's Diagnoses     Routine general medical examination at a health care facility    -  1    Bilateral leg edema        Multinodular goiter (nontoxic)        Vitamin D deficiency        Hyperlipidemia with target LDL less than 130        Family history of diabetes mellitus        Need for prophylactic vaccination against Streptococcus pneumoniae (pneumococcus)        Asymptomatic postmenopausal status        Screening for malignant neoplasm of cervix          Care Instructions    I can order a diuretic once I get your lab results.             Meanwhile,cut back on salty foods and elevate your legs.          I will let you know your lab results.                      You should schedule a bone density test at the [facility] office.        Call for an appointment.              Follow-ups after your visit        Future tests that were ordered for you today     Open Future Orders        Priority Expected Expires Ordered    DEXA HIP/PELVIS/SPINE - Future Routine  9/10/2019 9/10/2018            Who to contact     If you have questions or need follow up information about today's clinic visit or your schedule please contact [facility] directly at [phone].  Normal or non-critical lab and imaging results will be communicated to you by MyChart, letter or phone within 4 business days after the clinic has received the results. If you do not hear from us within 7 days, please contact the clinic through MyChart or phone. If you have a critical or abnormal lab result, we will notify you by phone as soon as possible.  Submit refill requests through [employer] or call your pharmacy and they  "will forward the refill request to us. Please allow 3 business days for your refill to be completed.          Additional Information About Your Visit        Care EveryWhere ID     This is your Care EveryWhere ID. This could be used by other organizations to access your [facility] medical records  [id number]        Your Vitals Were     Pulse Temperature Respirations Height Last Period Pulse Oximetry    64 98.5  F (36.9  C) 20 5' 5\" (1.651 m) 10/29/1998 99%    Breastfeeding? BMI (Body Mass Index)                No 32.45 kg/m2           Blood Pressure from Last 3 Encounters:   09/10/18 120/62   02/06/18 126/68   06/26/17 116/72    Weight from Last 3 Encounters:   09/10/18 195 lb (88.5 kg)   02/06/18 188 lb (85.3 kg)   06/26/17 174 lb (78.9 kg)              We Performed the Following     CBC with platelets and differential     Comprehensive metabolic panel (BMP + Alb, Alk Phos, ALT, AST, Total. Bili, TP)     Hemoglobin A1c     Lipid Profile (Chol, Trig, HDL, LDL calc)     Pap imaged thin layer screen with HPV - recommended age 30 - 65 years (select HPV order below)     TSH with free T4 reflex     Vitamin D Deficiency        Primary Care Provider Office Phone # Fax #    Thomas Cormier [phone] [address] [facility] [address]        Equal Access to Services     MIKE CLARK : Hadii aad ku hadasho Soomaali, waaxda luqadaha, qaybta kaalmada johnnyyada, saul márquez. So [facility] [phone].    ATENCIÓN: Si habla español, tiene a collier disposición servicios gratuitos de asistencia lingüística. Rachid al [phone].    We comply with applicable federal civil rights laws and [place] laws. We do not discriminate on the basis of race, color, national origin, age, disability, sex, sexual orientation, or gender identity.            Thank you!     Thank you for choosing [facility]  for your care. Our goal is always to provide you " with excellent care. Hearing back from our patients is one way we can continue to improve our services. Please take a few minutes to complete the written survey that you may receive in the mail after your visit with us. Thank you!             Your Updated Medication List - Protect others around you: Learn how to safely use, store and throw away your medicines at www.disposemymeds.org.          This list is accurate as of 9/10/18  4:58 PM.  Always use your most recent med list.                   Brand Name Dispense Instructions for use Diagnosis    aspirin 81 MG tablet      Take 1 tablet by mouth daily        vitamin D 2000 units tablet     100 tablet    Take 2,000 Units by mouth daily    Vitamin D insufficiency

## 2018-09-10 NOTE — PROGRESS NOTES
"  SUBJECTIVE:   Nanci Levy is a 63 year old female who presents to clinic today for the following health issues:  {Provider please address medication reconciliation discrepancies--rooming staff please delete if no med/rec issues}    {Superlists:133505}    {additional problems for provider to add:108423}    Problem list and histories reviewed & adjusted, as indicated.  Additional history: {NONE - AS DOCUMENTED:517546::\"as documented\"}    {HIST REVIEW/ LINKS 2:198660}    Reviewed and updated as needed this visit by clinical staff  Tobacco  Allergies  Meds  Med Hx  Surg Hx  Fam Hx  Soc Hx      Reviewed and updated as needed this visit by Provider         {PROVIDER CHARTING PREFERENCE:585928}  "

## 2018-09-10 NOTE — PROGRESS NOTES
SUBJECTIVE:   Nanci Levy is a 63 year old female who presents for Preventive Visit.      Are you in the first 12 months of your Medicare coverage?  Yes,  Visual Acuity:  Right Eye: pass   Left Eye: pass- Both Eyes:pas/ using glasses all the time, and they are bifocals    Physical   Annual:     Getting at least 3 servings of Calcium per day:  Yes    Bi-annual eye exam:  Yes    Dental care twice a year:  Yes    Sleep apnea or symptoms of sleep apnea:  Daytime drowsiness    Diet:  Regular (no restrictions)    Taking medications regularly:  Yes    Medication side effects:  Not applicable    Additional concerns today:  YES    Ability to successfully perform activities of daily living: no assistance needed    Home Safety:  No safety concerns identified    Hearing Impairment: no hearing concerns        Fall risk:     Low risk- no falls within last year  COGNITIVE SCREEN  1) Repeat 3 items (Leader, Season, Table)    2) Clock draw: NORMAL  3) 3 item recall: Recalls 3 objects  Results: 3 items recalled: COGNITIVE IMPAIRMENT LESS LIKELY    Mini-CogTM Copyright S [employer]. Licensed by the author for use in [facility]; reprinted with permission (vinicio@.[email or web address]). All rights reserved.        Reviewed and updated as needed this visit by clinical staff  Tobacco  Allergies  Meds  Med Hx  Surg Hx  Fam Hx  Soc Hx        Reviewed and updated as needed this visit by Provider            Alcohol Use 9/10/2018   If you drink alcohol do you typically have greater than 3 drinks per day OR greater than 7 drinks per week? Not Applicable           C/o leg edema.        Legs ache; takes aleve approx weekly.               Wants labs; 5 hrs pp.                                            Believed ears are plugged with cerumen.                        Has not donated blood for 3-4 yrs.                                                                              Today's PHQ-2 Score:   PHQ-2 ( 1999 Pfizer) 9/10/2018   Q1:  Little interest or pleasure in doing things 0   Q2: Feeling down, depressed or hopeless 0   PHQ-2 Score 0   Q1: Little interest or pleasure in doing things Not at all   Q2: Feeling down, depressed or hopeless Not at all   PHQ-2 Score 0       Do you feel safe in your environment - Yes    Do you have a Health Care Directive?: No: Advance care planning reviewed with patient; information given to patient to review.    Current providers sharing in care for this patient include:   Patient Care Team:  Thomas Cormier MD as PCP - General (Internal Medicine)    The following health maintenance items are reviewed in Epic and correct as of today:  Health Maintenance   Topic Date Due     DEVONTE QUESTIONNAIRE 1 YEAR  [birth year]10/1973     PHQ-9 Q1YR  04/10/1971     FALL RISK ASSESSMENT  04/10/2018     DEXA SCAN SCREENING (SYSTEM ASSIGNED)  04/10/2018     PNEUMOCOCCAL (1 of 2 - PCV13) 04/10/2018     INFLUENZA VACCINE (1) 09/01/2018     PAP Q3 YR  12/28/2018     MAMMO SCREEN Q2 YR (SYSTEM ASSIGNED)  06/20/2019     ADVANCE DIRECTIVE PLANNING Q5 YRS  09/02/2020     LIPID SCREEN Q5 YR FEMALE (SYSTEM ASSIGNED)  06/26/2022     TETANUS IMMUNIZATION (SYSTEM ASSIGNED)  05/24/2023     COLON CANCER SCREEN (SYSTEM ASSIGNED)  02/18/2024     HIV SCREEN (SYSTEM ASSIGNED)  Completed     HEPATITIS C SCREENING  Completed     Patient Active Problem List    Diagnosis Date Noted     Cervical high risk HPV (human papillomavirus) test positive 12/01/2014     Priority: High     12/2014: nil pap, + HPV (not 16 or 18). Plan cotest pap & HPV in 1 year.  Tracking started.  12/28/15: NIL pap, neg HPV. Plan cotest pap & HPV in 3 years.           Left facial numbness 02/06/2018     Priority: Medium     MRI brain neg       Paresthesias 02/06/2018     Priority: Medium     left side       Chronic pain of left knee 06/26/2017     Priority: Medium     tried PT x 12 months, no improvement and now daily constant pain.  Should see sports med/ortho again.  Referred.        "Thyroid nodule 06/26/2017     Priority: Medium     thyroid US in 2014 showed 2 solid nodules smaller in size.  Nanci thinks more noticable; recheck US today.       Bursitis, knee, left 03/31/2016     Priority: Medium     X-ray indicates patellofemoral arthritis       Paresthesia of left foot 03/31/2016     Priority: Medium     Hyperlipidemia with target LDL less than 130 12/08/2014     Priority: Medium     Diagnosis updated by automated process. Provider to review and confirm.       Sciatica 11/25/2013     Priority: Medium     Left side; 1/13; also see ortho note 2006; had an L5-S1 osteophyte/disc complex with moderate to severe foraminal stenosis L        Chest wall pain      Priority: Medium     see cardiology note; MRI heart nml                 CXR 2/11 normal       Multinodular goiter (nontoxic)      Priority: Medium     bx non-Dx; watery colloid ; not enough cells.         Had a 1 cm nodule in each lobe; bx in 2009; \"in all likelihood benign\" ;    US in 2/14; nodules are smaller       Vitamin D deficiency 10/29/2012     Priority: Medium     17 in 11/13; resume vit D  Problem list name updated by automated process. Provider to review       Family history of diabetes mellitus 10/29/2012     Priority: Medium     Arthralgia of temporomandibular joint 10/29/2012     Priority: Medium     left       Anemia 10/29/2012     Priority: Medium     12.7 with ferritin 41 in 10/12; add Fe with each blood donation;                           12.1 and 77 in 11/13, 12.1 and 56 in 12/14  Problem list name updated by automated process. Provider to review       Hearing loss 10/29/2012     Priority: Medium     left  Problem list name updated by automated process. Provider to review       ACP (advance care planning) 09/02/2015     Priority: Low     Advance Care Planning 9/2/2015: ACP Review and Resources Provided:  Reviewed chart for advance care plan.  Nanci Levy has no plan or code status on file. Discussed available " resources and provided with information. . Added by Nava Wilson               Preventive measure 10/30/2012     Priority: Low     APRIMA DATA BASE UNDER THE 1/21/13 NOTE  Colonoscopy 7/03, 2/14, neg, recheck in 10 yrs;                           Mammogram 12/13;2/15 ,6/17                          Pap 10/12;12/14 , 12/15                              plan DXA age 63       Past Surgical History:   Procedure Laterality Date     HYSTERECTOMY  08/1997    supra cervical abdominal hysterectomy; ovaries remain     Social History     Social History     Marital status: Single     Spouse name: N/A     Number of children: N/A     Years of education: N/A     Occupational History     Not on file.     Social History Main Topics     Smoking status: Never Smoker     Smokeless tobacco: Never Used     Alcohol use No     Drug use: No     Sexual activity: Yes     Partners: Male     Other Topics Concern     Parent/Sibling W/ Cabg, Mi Or Angioplasty Before 65f 55m? Yes     Social History Narrative       BP Readings from Last 3 Encounters:   09/10/18 120/62   02/06/18 126/68   06/26/17 116/72    Wt Readings from Last 3 Encounters:   09/10/18 195 lb (88.5 kg)   02/06/18 188 lb (85.3 kg)   06/26/17 174 lb (78.9 kg)                  Current Outpatient Prescriptions   Medication Sig Dispense Refill     aspirin 81 MG tablet Take 1 tablet by mouth daily       Cholecalciferol (VITAMIN D) 2000 UNITS tablet Take 2,000 Units by mouth daily 100 tablet 11     No Known Allergies        Review of Systems  CONSTITUTIONAL:POSITIVE  for weight gain and NEGATIVE  for chills and fever   INTEGUMENTARY/SKIN: NEGATIVE for worrisome rashes, moles or lesions  EYES: NEGATIVE for vision changes or irritation  ENT/MOUTH: POSITIVE for plugged ears and NEGATIVE for postnasal drainage, rhinorrhea-purulent and sinus pressure  RESP: NEGATIVE for significant cough or SOB  BREAST: NEGATIVE for masses, tenderness or discharge  CV: POSITIVE for lower extremity edema and  "NEGATIVE for chest pain/chest pressure and palpitations  GI: NEGATIVE for nausea, abdominal pain, heartburn, or change in bowel habits  : NEGATIVE for frequency, dysuria, or hematuria  NEURO: NEGATIVE for weakness, dizziness or paresthesias  ENDOCRINE: NEGATIVE for temperature intolerance, skin/hair changes  HEME: NEGATIVE for bleeding problems  PSYCHIATRIC: NEGATIVE for changes in mood or affect    OBJECTIVE:   Resp 20  Ht 5' 5\" (1.651 m)  Wt 195 lb (88.5 kg)  LMP 10/29/1998  BMI 32.45 kg/m2 Estimated body mass index is 32.45 kg/(m^2) as calculated from the following:    Height as of this encounter: 5' 5\" (1.651 m).    Weight as of this encounter: 195 lb (88.5 kg).  Physical Exam  GENERAL APPEARANCE: overweight, alert and no distress  EYES: Eyes grossly normal to inspection  HENT: nose and mouth without ulcers or lesions, oropharynx clear, oral mucous membranes moist and both ears: occluded with wax  NECK: no adenopathy, no asymmetry, masses, or scars and thyroid normal to palpation  RESP: lungs clear to auscultation - no rales, rhonchi or wheezes  BREAST: normal without masses, tenderness or nipple discharge and no palpable axillary masses or adenopathy  CV: regular rates and rhythm, normal S1 S2, no S3 or S4, no murmur, click or rub, peripheral pulses strong and 1+ bilateral lower extremity pitting edema     ABDOMEN: soft, nontender, no hepatosplenomegaly and no masses   (female): normal female external genitalia, normal urethral meatus, vaginal mucosal atrophy noted and cervix remains; Pap done  MS: crepitation with ROM both knees  SKIN: no suspicious lesions or rashes  NEURO: Normal strength and tone, sensory exam grossly normal, mentation intact and speech normal  PSYCH: mentation appears normal and affect normal/bright    Diagnostic Test Results:  pending    ASSESSMENT / PLAN:   Nanci was seen today for physical.    Diagnoses and all orders for this visit:    Routine general medical examination at a " health care facility  -     Hemoglobin A1c    Bilateral leg edema  -     Comprehensive metabolic panel (BMP + Alb, Alk Phos, ALT, AST, Total. Bili, TP)  -     TSH with free T4 reflex    Multinodular goiter (nontoxic)  -     CBC with platelets and differential    Vitamin D deficiency  -     Vitamin D Deficiency    Hyperlipidemia with target LDL less than 130  -     Lipid Profile (Chol, Trig, HDL, LDL calc)    Anemia, unspecified type  -     Ferritin    Family history of diabetes mellitus  -     Hemoglobin A1c    Need for prophylactic vaccination against Streptococcus pneumoniae (pneumococcus)  -     Pneumococcal vaccine 13 valent PCV13 IM (Prevnar) [09806]  -     ADMIN PNEUMOCOCCAL VACCINE (For MEDICARE Patients ONLY) []    Asymptomatic postmenopausal status  -     DEXA HIP/PELVIS/SPINE - Future; Future    Screening for malignant neoplasm of cervix  -     Pap imaged thin layer screen with HPV - recommended age 30 - 65 years (select HPV order below)          Summary and implications:  Multiple issues.       Check labs.           RTC for ear wash.    Patient Instructions   I can order a diuretic once I get your lab results.             Meanwhile,cut back on salty foods and elevate your legs.          I will let you know your lab results.                      You should schedule a bone density test at the [facility] office.        Call for an appointment.            End of Life Planning:  Patient currently has an advanced directive: No.  I have verified the patient's ablity to prepare an advanced directive/make health care decisions.  Literature was provided to assist patient in preparing an advanced directive.    COUNSELING:  Reviewed preventive health counseling, as reflected in patient instructions       Regular exercise       Healthy diet/nutrition    BP Readings from Last 1 Encounters:   02/06/18 126/68     Estimated body mass index is 32.45 kg/(m^2) as calculated from the following:    Height as of this  "encounter: 5' 5\" (1.651 m).    Weight as of this encounter: 195 lb (88.5 kg).      Weight management plan: Discussed healthy diet and exercise guidelines and patient will follow up in 12 months in clinic to re-evaluate.     reports that she has never smoked. She has never used smokeless tobacco.      Appropriate preventive services were discussed with this patient, including applicable screening as appropriate for cardiovascular disease, diabetes, osteopenia/osteoporosis, and glaucoma.  As appropriate for age/gender, discussed screening for colorectal cancer, prostate cancer, breast cancer, and cervical cancer. Checklist reviewing preventive services available has been given to the patient.    Reviewed patients plan of care and provided an AVS. The Basic Care Plan (routine screening as documented in Health Maintenance) for Nanci meets the Care Plan requirement. This Care Plan has been established and reviewed with the Patient.    Counseling Resources:  ATP IV Guidelines  Pooled Cohorts Equation Calculator  Breast Cancer Risk Calculator  FRAX Risk Assessment  ICSI Preventive Guidelines  Dietary Guidelines for Americans, 2010  USDA's MyPlate  ASA Prophylaxis  Lung CA Screening    Thomas Cormier MD  [facility]  Answers for HPI/ROS submitted by the patient on 9/10/2018   PHQ-2 Score: 0               Results for orders placed or performed in visit on 09/10/18   Comprehensive metabolic panel (BMP + Alb, Alk Phos, ALT, AST, Total. Bili, TP)   Result Value Ref Range    Sodium 140 133 - 144 mmol/L    Potassium 4.9 3.4 - 5.3 mmol/L    Chloride 105 94 - 109 mmol/L    Carbon Dioxide 26 20 - 32 mmol/L    Anion Gap 9 3 - 14 mmol/L    Glucose 84 70 - 99 mg/dL    Urea Nitrogen 15 7 - 30 mg/dL    Creatinine 0.75 0.52 - 1.04 mg/dL    GFR Estimate 77 >60 mL/min/1.7m2    GFR Estimate If Black >90 >60 mL/min/1.7m2    Calcium 8.7 8.5 - 10.1 mg/dL    Bilirubin Total 0.4 0.2 - 1.3 mg/dL    Albumin 3.8 " 3.4 - 5.0 g/dL    Protein Total 7.7 6.8 - 8.8 g/dL    Alkaline Phosphatase 122 40 - 150 U/L    ALT 24 0 - 50 U/L    AST 20 0 - 45 U/L   Lipid Profile (Chol, Trig, HDL, LDL calc)   Result Value Ref Range    Cholesterol 177 <200 mg/dL    Triglycerides 127 <150 mg/dL    HDL Cholesterol 47 (L) >49 mg/dL    LDL Cholesterol Calculated 105 (H) <100 mg/dL    Non HDL Cholesterol 130 (H) <130 mg/dL   TSH with free T4 reflex   Result Value Ref Range    TSH 1.03 0.40 - 4.00 mU/L   Vitamin D Deficiency   Result Value Ref Range    Vitamin D Deficiency screening 14 (L) 20 - 75 ug/L   CBC with platelets and differential   Result Value Ref Range    WBC 7.1 4.0 - 11.0 10e9/L    RBC Count 4.05 3.8 - 5.2 10e12/L    Hemoglobin 12.5 11.7 - 15.7 g/dL    Hematocrit 39.1 35.0 - 47.0 %    MCV 97 78 - 100 fl    MCH 30.9 26.5 - 33.0 pg    MCHC 32.0 31.5 - 36.5 g/dL    RDW 12.8 10.0 - 15.0 %    Platelet Count 232 150 - 450 10e9/L    Diff Method Automated Method     % Neutrophils 52.7 %    % Lymphocytes 36.4 %    % Monocytes 8.3 %    % Eosinophils 2.3 %    % Basophils 0.3 %    Absolute Neutrophil 3.8 1.6 - 8.3 10e9/L    Absolute Lymphocytes 2.6 0.8 - 5.3 10e9/L    Absolute Monocytes 0.6 0.0 - 1.3 10e9/L    Absolute Eosinophils 0.2 0.0 - 0.7 10e9/L    Absolute Basophils 0.0 0.0 - 0.2 10e9/L   Pap imaged thin layer screen with HPV - recommended age 30 - 65 years (select HPV order below)   Result Value Ref Range    PAP NIL     Copath Report         Patient Name: HERMELINDO CEDILLO  MR#: [id number]  Specimen #: [id number]  Collected: 9/10/2018  Received: 9/11/2018  Reported: 9/13/2018 10:17  Ordering Phy(s): MOE JOHNS    For improved result formatting, select 'View Enhanced Report Format' under   Linked Documents section.    SPECIMEN/STAIN PROCESS:  Pap imaged thin layer prep screening (Surepath, FocalPoint with guided   screening)       Pap-Cyto x 1, HPV ordered x 1    SOURCE:  Endocervical  ----------------------------------------------------------------   Pap imaged thin layer prep screening (Surepath, FocalPoint with guided   screening)  SPECIMEN ADEQUACY:  Satisfactory for evaluation.  -Transformation zone component absent.    CYTOLOGIC INTERPRETATION:    Negative for intraepithelial lesion or malignancy    Electronically signed out by:  AMBER Villanueva (ASCP)    Processed and screened at [facility]    CLINICAL HISTORY:  LMP: 10/29/1998  A previous normal p ap  Date of Last Pap: 12/28/15,    Papanicolaou Test Limitations:  Cervical cytology is a screening test with   limited sensitivity; regular  screening is critical for cancer prevention; Pap tests are primarily   effective for the diagnosis/prevention of  squamous cell carcinoma, not adenocarcinomas or other cancers.    TESTING LAB LOCATION:  [address]  [phone]    COLLECTION SITE:  Client:  [facility]  Location: [facility] (B)     Hemoglobin A1c   Result Value Ref Range    Hemoglobin A1C 5.6 0 - 5.6 %   Ferritin   Result Value Ref Range    Ferritin 67 8 - 252 ng/mL     Letter sent.         Most of your lab results are good,including the kidney,liver,bone marrow,thyroid,glucose,and the cholesterol levels.       The vitamin D level is low. Please add another 2,000 IU of vitamin D per day; you do not need an Rx for this.

## 2018-09-10 NOTE — LETTER
September 13, 2018      Nanci Levy  [address]  [facility] [address]        Dear ,    We are writing to inform you of your test results.           Most of your lab results are good,including the kidney,liver,bone marrow,thyroid,glucose,and the cholesterol levels.                                                                               The vitamin D level is low. Please add another 2,000 IU of vitamin D per day; you do not need an Rx for this.     Resulted Orders   Comprehensive metabolic panel (BMP + Alb, Alk Phos, ALT, AST, Total. Bili, TP)   Result Value Ref Range    Sodium 140 133 - 144 mmol/L    Potassium 4.9 3.4 - 5.3 mmol/L    Chloride 105 94 - 109 mmol/L    Carbon Dioxide 26 20 - 32 mmol/L    Anion Gap 9 3 - 14 mmol/L    Glucose 84 70 - 99 mg/dL      Comment:      Non Fasting    Urea Nitrogen 15 7 - 30 mg/dL    Creatinine 0.75 0.52 - 1.04 mg/dL    GFR Estimate 77 >60 mL/min/1.7m2      Comment:      Non  GFR Calc    GFR Estimate If Black >90 >60 mL/min/1.7m2      Comment:       GFR Calc    Calcium 8.7 8.5 - 10.1 mg/dL    Bilirubin Total 0.4 0.2 - 1.3 mg/dL    Albumin 3.8 3.4 - 5.0 g/dL    Protein Total 7.7 6.8 - 8.8 g/dL    Alkaline Phosphatase 122 40 - 150 U/L    ALT 24 0 - 50 U/L    AST 20 0 - 45 U/L   Lipid Profile (Chol, Trig, HDL, LDL calc)   Result Value Ref Range    Cholesterol 177 <200 mg/dL    Triglycerides 127 <150 mg/dL      Comment:      Non Fasting    HDL Cholesterol 47 (L) >49 mg/dL    LDL Cholesterol Calculated 105 (H) <100 mg/dL      Comment:      Above desirable:  100-129 mg/dl  Borderline High:  130-159 mg/dL  High:             160-189 mg/dL  Very high:       >189 mg/dl      Non HDL Cholesterol 130 (H) <130 mg/dL      Comment:      Above Desirable:  130-159 mg/dl  Borderline high:  160-189 mg/dl  High:             190-219 mg/dl  Very high:       >219 mg/dl     TSH with free T4 reflex   Result Value Ref Range    TSH 1.03  0.40 - 4.00 mU/L   Vitamin D Deficiency   Result Value Ref Range    Vitamin D Deficiency screening 14 (L) 20 - 75 ug/L      Comment:      Season, race, dietary intake, and treatment affect the concentration of   25-hydroxy-Vitamin D. Values may decrease during winter months and increase   during summer months. Values 20-29 ug/L may indicate Vitamin D insufficiency   and values <20 ug/L may indicate Vitamin D deficiency.  Vitamin D determination is routinely performed by an immunoassay specific for   25 hydroxyvitamin D3.  If an individual is on vitamin D2 (ergocalciferol)   supplementation, please specify 25 OH vitamin D2 and D3 level determination by   LCMSMS test VITD23.     CBC with platelets and differential   Result Value Ref Range    WBC 7.1 4.0 - 11.0 10e9/L    RBC Count 4.05 3.8 - 5.2 10e12/L    Hemoglobin 12.5 11.7 - 15.7 g/dL    Hematocrit 39.1 35.0 - 47.0 %    MCV 97 78 - 100 fl    MCH 30.9 26.5 - 33.0 pg    MCHC 32.0 31.5 - 36.5 g/dL    RDW 12.8 10.0 - 15.0 %    Platelet Count 232 150 - 450 10e9/L    Diff Method Automated Method     % Neutrophils 52.7 %    % Lymphocytes 36.4 %    % Monocytes 8.3 %    % Eosinophils 2.3 %    % Basophils 0.3 %    Absolute Neutrophil 3.8 1.6 - 8.3 10e9/L    Absolute Lymphocytes 2.6 0.8 - 5.3 10e9/L    Absolute Monocytes 0.6 0.0 - 1.3 10e9/L    Absolute Eosinophils 0.2 0.0 - 0.7 10e9/L    Absolute Basophils 0.0 0.0 - 0.2 10e9/L   Hemoglobin A1c   Result Value Ref Range    Hemoglobin A1C 5.6 0 - 5.6 %      Comment:      Normal <5.7% Prediabetes 5.7-6.4%  Diabetes 6.5% or higher - adopted from ADA   consensus guidelines.     Ferritin   Result Value Ref Range    Ferritin 67 8 - 252 ng/mL       If you have any questions or concerns, please call the clinic at the number listed above.       Sincerely,        Thomas Cormier MD

## 2018-09-10 NOTE — PATIENT INSTRUCTIONS
I can order a diuretic once I get your lab results.             Meanwhile,cut back on salty foods and elevate your legs.          I will let you know your lab results.                      You should schedule a bone density test at the [facility] office.        Call for an appointment.

## 2018-09-10 NOTE — LETTER
September 18, 2018      Nanci Levy  [address] [place] [id number]    Dear ,      I am happy to inform you that your cervical cancer screening test (PAP smear) was normal and your Human Papillomavirus (HPV) test was negative.    Per current guidelines, you no longer need to have pap smears completed. Please return for annual pelvic exams.    Please continue to be seen every year for an annual wellness visit and other preventative tests.     Please contact my office at [phone] if you have further questions.    Sincerely,      Thomas Cormier MD./  Cheyenne Mcgarry RN-Pap Tracking

## 2018-09-10 NOTE — NURSING NOTE
Prior to injection verified patient identity using patient's name and date of birth.  Due to injection administration, patient instructed to remain in clinic for 15 minutes  afterwards, and to report any adverse reaction to me immediately.  Emperatriz Gamboa LPN

## 2018-09-11 LAB
DEPRECATED CALCIDIOL+CALCIFEROL SERPL-MC: 14 UG/L (ref 20–75)
FERRITIN SERPL-MCNC: 67 NG/ML (ref 8–252)

## 2018-09-12 LAB
ALBUMIN SERPL-MCNC: 3.8 G/DL (ref 3.4–5)
ALP SERPL-CCNC: 122 U/L (ref 40–150)
ALT SERPL W P-5'-P-CCNC: 24 U/L (ref 0–50)
ANION GAP SERPL CALCULATED.3IONS-SCNC: 9 MMOL/L (ref 3–14)
AST SERPL W P-5'-P-CCNC: 20 U/L (ref 0–45)
BILIRUB SERPL-MCNC: 0.4 MG/DL (ref 0.2–1.3)
BUN SERPL-MCNC: 15 MG/DL (ref 7–30)
CALCIUM SERPL-MCNC: 8.7 MG/DL (ref 8.5–10.1)
CHLORIDE SERPL-SCNC: 105 MMOL/L (ref 94–109)
CHOLEST SERPL-MCNC: 177 MG/DL
CO2 SERPL-SCNC: 26 MMOL/L (ref 20–32)
CREAT SERPL-MCNC: 0.75 MG/DL (ref 0.52–1.04)
GFR SERPL CREATININE-BSD FRML MDRD: 77 ML/MIN/1.7M2
GLUCOSE SERPL-MCNC: 84 MG/DL (ref 70–99)
HDLC SERPL-MCNC: 47 MG/DL
LDLC SERPL CALC-MCNC: 105 MG/DL
NONHDLC SERPL-MCNC: 130 MG/DL
POTASSIUM SERPL-SCNC: 4.9 MMOL/L (ref 3.4–5.3)
PROT SERPL-MCNC: 7.7 G/DL (ref 6.8–8.8)
SODIUM SERPL-SCNC: 140 MMOL/L (ref 133–144)
TRIGL SERPL-MCNC: 127 MG/DL
TSH SERPL DL<=0.005 MIU/L-ACNC: 1.03 MU/L (ref 0.4–4)

## 2018-09-13 LAB
COPATH REPORT: NORMAL
PAP: NORMAL

## 2018-09-14 LAB
FINAL DIAGNOSIS: NORMAL
HPV HR 12 DNA CVX QL NAA+PROBE: NEGATIVE
HPV16 DNA SPEC QL NAA+PROBE: NEGATIVE
HPV18 DNA SPEC QL NAA+PROBE: NEGATIVE
SPECIMEN DESCRIPTION: NORMAL
SPECIMEN SOURCE CVX/VAG CYTO: NORMAL

## 2018-09-18 PROBLEM — Z12.4 CERVICAL CANCER SCREENING: Status: ACTIVE | Noted: 2018-09-18

## 2018-11-06 ENCOUNTER — RADIANT APPOINTMENT (OUTPATIENT)
Dept: MAMMOGRAPHY | Facility: CLINIC | Age: 63
End: 2018-11-06
Payer: COMMERCIAL

## 2018-11-06 DIAGNOSIS — Z12.31 VISIT FOR SCREENING MAMMOGRAM: ICD-10-CM

## 2018-11-06 PROCEDURE — 77067 SCR MAMMO BI INCL CAD: CPT | Mod: TC

## 2019-02-09 ENCOUNTER — NURSE TRIAGE (OUTPATIENT)
Dept: NURSING | Facility: CLINIC | Age: 64
End: 2019-02-09

## 2019-02-09 NOTE — TELEPHONE ENCOUNTER
"Reason for Call/Nurse Assessment:  62 y/o female calls about legs cramping up like a jo horse, side of leg in the muscle, left more than right. No other symptoms, no neuro deficits, able to walk and bear weight. Reviewed protocols, recommendation is Home Care unless symptoms worsen, increase fluids, Gatorade or Pwerade for potassium and magnesium, rest, stretch out muscle, if cramps continue, follow up with PCP. Patient verbalized understanding of and agreement with plan and had no further questions.       Eden Velasquez RN - [facility]  02/09/2019  2:27AM      Additional Information    Negative: Looks like a broken bone or dislocated joint (e.g., crooked or deformed)    Negative: Sounds like a life-threatening emergency to the triager    Negative: Chest pain    Negative: Difficulty breathing    Negative: Entire foot is cool or blue in comparison to other side    Negative: Unable to walk    Negative: [1] Red area or streak AND [2] fever    Negative: [1] Swollen joint AND [2] fever    Negative: [1] Cast on leg or ankle AND [2] now increased pain    Negative: Patient sounds very sick or weak to the triager    Negative: [1] Red area or streak AND [2] large (> 2 in. or 5 cm)    Negative: [1] Thigh, calf, or ankle swelling AND [2] bilateral AND [3] 1 side is more swollen    Negative: [1] Thigh, calf, or ankle swelling AND [2] only 1 side    Negative: [1] Thigh or calf pain AND [2] only 1 side AND [3] present > 1 hour    Negative: [1] SEVERE pain (e.g., excruciating, unable to do any normal activities) AND [2] not improved after 2 hours of pain medicine    Negative: History of prior \"blood clot\" in leg or lungs (i.e., deep vein thrombosis, pulmonary embolism)    Negative: History of inherited increased risk of blood clots (e.g., Factor 5 Leiden, Anti-thrombin 3, Protein C or Protein S deficiency, Prothrombin mutation)    Negative: Recent illness requiring prolonged bedrest (i.e., immobilization)    " "Negative: Hip or leg fracture in past 2 months (e.g, or had cast on leg or ankle)    Negative: Major surgery in the past two months    Negative: Cancer treatment in the past two months (or has cancer now)    Negative: Recent long-distance travel with prolonged time in car, bus, plane, or train (i.e., within past 2 weeks; 6 or  more hours duration)    Negative: [1] Painful rash AND [2] multiple small blisters grouped together (i.e., dermatomal distribution or \"band\" or \"stripe\")    Negative: Looks like a boil, infected sore, deep ulcer or other infected rash (spreading redness, pus)    Negative: [1] Localized rash is very painful AND [2] no fever    Negative: Numbness in a leg or foot (i.e., loss of sensation)    Negative: Localized pain, redness or hard lump along vein    [1] Caused by muscle cramps in the thigh, calf, or foot AND [2] present < 1 hour (brief, now gone) (all triage questions negative)    Protocols used: LEG PAIN-ADULT-      "

## 2019-05-10 ENCOUNTER — TELEPHONE (OUTPATIENT)
Dept: FAMILY MEDICINE | Facility: CLINIC | Age: 64
End: 2019-05-10

## 2019-05-10 NOTE — TELEPHONE ENCOUNTER
Nanci Levy is a 64 year old female who calls with suspected food poisening    NURSING ASSESSMENT:  Description: Last Saturday 5/4 during event out of state things she got food poisening, Sunday 5/5 symptoms started. Diarrhea for a few days, then loose stool since then and are slowly starting to form. Feels nauseous on and off.     Negative for: altered mental status, SOB, vomiting blood, bloody stools, severe pain, sign of dehydration, rash, fevor,     States has been eating a regular diet for the last week: chicken rice, salad, vegetables, coffee, yogurt  Fluids: water and ginger ale, gatorade    Onset/duration: 5/4/2019  Precip. factors:  Dinner out of state with family. 10-15 people within family have been displaying same symptoms.     Allergies: No Known Allergies      NURSING PLAN: Nursing advice to patient will try home care methods for next 72 hours, not improved call back    RECOMMENDED DISPOSITION:  Home care advice - avoid milk products, spicy food, caffine, alcohol. Will push fluids frequently (gatorade, water, ginger ale). Wash hands frequently and monitor bowel movements. Will follow up with the clinic if symptoms do not subside in 72 hours of trying home care methods.   Will comply with recommendation: Yes  If further questions/concerns or if symptoms do not improve, worsen or new symptoms develop, call your PCP or [facility] as soon as possible.      Guideline used:  Telephone Triage Protocols for Nurses, Fifth Edition, Julia Villeda RN

## 2019-05-16 ENCOUNTER — NURSE TRIAGE (OUTPATIENT)
Dept: NURSING | Facility: CLINIC | Age: 64
End: 2019-05-16

## 2019-05-17 ENCOUNTER — OFFICE VISIT (OUTPATIENT)
Dept: FAMILY MEDICINE | Facility: CLINIC | Age: 64
End: 2019-05-17
Payer: COMMERCIAL

## 2019-05-17 VITALS
RESPIRATION RATE: 16 BRPM | TEMPERATURE: 100.3 F | SYSTOLIC BLOOD PRESSURE: 122 MMHG | DIASTOLIC BLOOD PRESSURE: 80 MMHG | HEART RATE: 83 BPM | BODY MASS INDEX: 34.02 KG/M2 | HEIGHT: 63 IN | WEIGHT: 192 LBS | OXYGEN SATURATION: 98 %

## 2019-05-17 DIAGNOSIS — J11.1 INFLUENZA-LIKE ILLNESS: Primary | ICD-10-CM

## 2019-05-17 PROCEDURE — 99213 OFFICE O/P EST LOW 20 MIN: CPT | Performed by: PHYSICIAN ASSISTANT

## 2019-05-17 RX ORDER — ALBUTEROL SULFATE 90 UG/1
1-2 AEROSOL, METERED RESPIRATORY (INHALATION) EVERY 4 HOURS PRN
Qty: 8.5 G | Refills: 0 | Status: SHIPPED | OUTPATIENT
Start: 2019-05-17 | End: 2019-06-10

## 2019-05-17 RX ORDER — OSELTAMIVIR PHOSPHATE 75 MG/1
75 CAPSULE ORAL 2 TIMES DAILY
Qty: 10 CAPSULE | Refills: 0 | Status: SHIPPED | OUTPATIENT
Start: 2019-05-17 | End: 2019-06-10

## 2019-05-17 ASSESSMENT — ANXIETY QUESTIONNAIRES
GAD7 TOTAL SCORE: 0
1. FEELING NERVOUS, ANXIOUS, OR ON EDGE: NOT AT ALL
GAD7 TOTAL SCORE: 0
5. BEING SO RESTLESS THAT IT IS HARD TO SIT STILL: NOT AT ALL
3. WORRYING TOO MUCH ABOUT DIFFERENT THINGS: NOT AT ALL
GAD7 TOTAL SCORE: 0
6. BECOMING EASILY ANNOYED OR IRRITABLE: NOT AT ALL
7. FEELING AFRAID AS IF SOMETHING AWFUL MIGHT HAPPEN: NOT AT ALL
4. TROUBLE RELAXING: NOT AT ALL
2. NOT BEING ABLE TO STOP OR CONTROL WORRYING: NOT AT ALL
7. FEELING AFRAID AS IF SOMETHING AWFUL MIGHT HAPPEN: NOT AT ALL

## 2019-05-17 ASSESSMENT — PATIENT HEALTH QUESTIONNAIRE - PHQ9
SUM OF ALL RESPONSES TO PHQ QUESTIONS 1-9: 0
10. IF YOU CHECKED OFF ANY PROBLEMS, HOW DIFFICULT HAVE THESE PROBLEMS MADE IT FOR YOU TO DO YOUR WORK, TAKE CARE OF THINGS AT HOME, OR GET ALONG WITH OTHER PEOPLE: NOT DIFFICULT AT ALL
SUM OF ALL RESPONSES TO PHQ QUESTIONS 1-9: 0

## 2019-05-17 ASSESSMENT — MIFFLIN-ST. JEOR: SCORE: 1380.04

## 2019-05-17 NOTE — PROGRESS NOTES
SUBJECTIVE:   Nanci Levy is a 64 year old female who presents to clinic today for the following   health issues:      RESPIRATORY SYMPTOMS      Duration: yesterday    Description  sore throat, cough, fever, headache and shortness of breath.  Pt thinks it may be allergies, but never had allergies.  Pt is also having pain in her back in lung area    Severity: moderate    Accompanying signs and symptoms: None    History (predisposing factors):  none    Precipitating or alleviating factors: None    Therapies tried and outcome:  none      HPI additional notes:    Chief Complaint   Patient presents with     Fever     Headache     Shortness of Breath     Cough     Nanci presents today with rapid onset URI since yesterday. C/o subjective fever, chills, dry cough, chest tightness, intermittent wheeze, CALHOUN, ST, rhinorrhea. Took Nyquil last night with relief.     ROS:    A Comprehensive greater than 10 system review of systems was carried out.    Pertinent positives and negatives are noted above.  Otherwise negative for contributory information.      Chart Review:  History   Smoking Status     Never Smoker   Smokeless Tobacco     Never Used       Patient Active Problem List   Diagnosis     Vitamin D deficiency     Family history of diabetes mellitus     Arthralgia of temporomandibular joint     Anemia     Hearing loss     Chest wall pain     Multinodular goiter (nontoxic)     Preventive measure     Sciatica     Hyperlipidemia with target LDL less than 130     ACP (advance care planning)     Bursitis, knee, left     Paresthesia of left foot     Chronic pain of left knee     Thyroid nodule     Left facial numbness     Paresthesias     Cervical cancer screening     Past Surgical History:   Procedure Laterality Date     HYSTERECTOMY  08/1997    supra cervical abdominal hysterectomy; ovaries remain     Problem list, Medication list, Allergies, Medical/Social/Surg/Family hx reviewed in EPIC, updated as appropriate.  "  OBJECTIVE:                                                    /80   Pulse 83   Temp 100.3  F (37.9  C)   Resp 16   Ht 1.6 m (5' 3\")   Wt 87.1 kg (192 lb)   LMP 10/29/1998   SpO2 98%   BMI 34.01 kg/m    Body mass index is 34.01 kg/m .  GENERAL:  WDWN, no acute distress  PSYCH: pleasant, cooperative  EYES: no discharge, no injection  HENT:  Normocephalic. Moist mucus membranes. TMs pearly gray w/o effusion. Nasal mucosa edematous, clear rhinorrhea. Oropharynx pink w/o tonsillar hypertrophy or exudate, uvula midline, clear PND.  NECK:  Supple, symmetric, no SUMMER  LUNGS:  Clear to auscultation bilaterally without rhonchi, rales, or wheeze. Chest rise symmetric and no tenderness to palpation.  HEART:  Regular rate & rhythm. No murmur, gallop, or rub.  EXTREMITIES:  No gross deformities, moves all 4 limbs spontaneously  SKIN:  Warm and dry, no rash or suspicious lesions    NEUROLOGIC: alert, sensation grossly intact.    Diagnostic test results: none     ASSESSMENT/PLAN:                                                          ICD-10-CM    1. Influenza-like illness R69 oseltamivir (TAMIFLU) 75 MG capsule     albuterol (PROAIR HFA/PROVENTIL HFA/VENTOLIN HFA) 108 (90 Base) MCG/ACT inhaler     S/s consistent with LEVON, rapid flu testing has been discontinued for the season so this was not checked. Will treat empirically with Tamiflu, reviewed therapeutic effects. Use albuterol prn wheeze, chest tightness. Tylenol prn pain, fever. Get plenty of rest and fluids; cover your cough and practice good handwashing. Ok to return to regular activities once fever-free x24 hrs.     Please see patient instructions for treatment details.    Follow up in 7-10 days if not improving as anticipated, sooner PRN.    Sadia Valle PA-C  [facility]     "

## 2019-05-17 NOTE — TELEPHONE ENCOUNTER
Patient calls about seasonal allergies.  Patient reports when she went goes outside today, she had more difficutty with breathing, has some chills, was coughing, and had a sore throat.  Patient says she is not taking any antihistamines currently.  To sleep, patient intends to take Nyquil.   Patient says she has an appointment tomorrow.  Reviewed guideline and care advice with caller to be see within 2 weeks.  Caller verbalizes understanding.        Additional Information    Negative: Wheezing (high pitched whistling sound) and previous asthma attacks or use of asthma medicines    Negative: Doesn't match the SYMPTOMS for nasal allergy    Negative: Patient sounds very sick or weak to the triager    Negative: Lots of coughing    Negative: Lots of yellow or green discharge from nose, present > 3 days    Negative: Nasal discharge present > 10 days    Negative: MODERATE-SEVERE nasal allergy symptoms (i.e., interfere with sleep, school, or work) and taking antihistamines > 2 days    Negative: Patient wants to be seen    Nasal allergies occur only certain times of year and diagnosis of hay fever has never been confirmed by a physician    Protocols used: HAY FEVER - NASAL ALLERGIES-A-OH

## 2019-05-18 ASSESSMENT — ANXIETY QUESTIONNAIRES: GAD7 TOTAL SCORE: 0

## 2019-06-10 ENCOUNTER — OFFICE VISIT (OUTPATIENT)
Dept: FAMILY MEDICINE | Facility: CLINIC | Age: 64
End: 2019-06-10
Payer: COMMERCIAL

## 2019-06-10 VITALS
RESPIRATION RATE: 20 BRPM | TEMPERATURE: 98.2 F | BODY MASS INDEX: 34.02 KG/M2 | DIASTOLIC BLOOD PRESSURE: 70 MMHG | WEIGHT: 192 LBS | HEIGHT: 63 IN | HEART RATE: 76 BPM | OXYGEN SATURATION: 100 % | SYSTOLIC BLOOD PRESSURE: 114 MMHG

## 2019-06-10 DIAGNOSIS — R60.0 BILATERAL LEG EDEMA: ICD-10-CM

## 2019-06-10 DIAGNOSIS — R05.3 PERSISTENT COUGH: Primary | ICD-10-CM

## 2019-06-10 PROBLEM — J11.1 INFLUENZA-LIKE ILLNESS: Status: ACTIVE | Noted: 2019-06-10

## 2019-06-10 PROCEDURE — 99214 OFFICE O/P EST MOD 30 MIN: CPT | Performed by: INTERNAL MEDICINE

## 2019-06-10 RX ORDER — ALBUTEROL SULFATE 90 UG/1
2 AEROSOL, METERED RESPIRATORY (INHALATION) 2 TIMES DAILY
Qty: 8.5 G | Refills: 1 | Status: SHIPPED | OUTPATIENT
Start: 2019-06-10 | End: 2019-06-29

## 2019-06-10 RX ORDER — TRIAMTERENE/HYDROCHLOROTHIAZID 37.5-25 MG
1 TABLET ORAL DAILY
Qty: 7 TABLET | Refills: 0 | Status: SHIPPED | OUTPATIENT
Start: 2019-06-10 | End: 2019-06-29

## 2019-06-10 ASSESSMENT — MIFFLIN-ST. JEOR: SCORE: 1380.04

## 2019-06-10 NOTE — PROGRESS NOTES
"Subjective     Nanci Levy is a 64 year old female who presents to clinic today for the following health issues:    HPI   Concern - cough  Onset: 5/16/2019    Description:   \"hard to breathe from heat to cool environment\"    Intensity: moderate    Progression of Symptoms:  same    Accompanying Signs & Symptoms:   coughing up a lot of phlegm and swelling in both ankles    Previous history of similar problem:   no    Precipitating factors:   Worsened by: nothing    Alleviating factors:  Improved by: Mucinex at times    Therapies Tried and outcome: tried Mucinex.                                                                                                                                  wrt her recent visit,  Did not take Tamiflu; pharmacy did not have any.        She did have a low-grade fever at the time of that office appointment.                        Overall, she feels better, but she has a persistent cough; an occasional wheeze.   Some mucous production.             Albuterol was ordered; she did not pick this up.                        Now retaining some fluid also.         She has a history of  Intermittent peripheral edema. She has not been really careful with her salt intake recently.                                              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          Current Outpatient Medications   Medication Sig Dispense Refill     aspirin 81 MG tablet Take 1 tablet by mouth daily       Cholecalciferol (VITAMIN D) 2000 UNITS tablet Take 2,000 Units by mouth daily 100 tablet 11     albuterol (PROAIR HFA/PROVENTIL HFA/VENTOLIN HFA) 108 (90 Base) MCG/ACT inhaler Inhale 1-2 puffs into the lungs every 4 hours as needed for shortness of breath / dyspnea or wheezing (Patient not taking: Reported on 6/10/2019) 8.5 g 0     No Known Allergies  BP Readings from Last 3 Encounters:   06/10/19 114/70   05/17/19 122/80   09/10/18 120/62    Wt " "Readings from Last 3 Encounters:   06/10/19 87.1 kg (192 lb)   05/17/19 87.1 kg (192 lb)   09/10/18 88.5 kg (195 lb)            Patient Active Problem List    Diagnosis Date Noted     Cervical cancer screening 09/18/2018     Priority: Medium     Pt age 63   Normal paps in  02/25/11: NIL pap  10/29/12: NIL pap  12/2014: NIL pap, + HPV (not 16 or 18). Plan cotest pap & HPV in 1 year.    12/28/15: NIL pap, neg HPV. Plan cotest pap & HPV in 3 years.  09/10/18: NIL pap, neg HR HPV result. Plan routine screening.   No history of MATTHEW 2 or greater found in epic.   No further cervical cancer screening recommended.   Hm updated.              Left facial numbness 02/06/2018     Priority: Medium     MRI brain neg       Paresthesias 02/06/2018     Priority: Medium     left side       Chronic pain of left knee 06/26/2017     Priority: Medium     tried PT x 12 months, no improvement and now daily constant pain.  Should see sports med/ortho again.  Referred.       Thyroid nodule 06/26/2017     Priority: Medium     thyroid US in 2014 showed 2 solid nodules smaller in size.  Nanci thinks more noticable; recheck US today.       Bursitis, knee, left 03/31/2016     Priority: Medium     X-ray indicates patellofemoral arthritis       Paresthesia of left foot 03/31/2016     Priority: Medium     Hyperlipidemia with target LDL less than 130 12/08/2014     Priority: Medium     Diagnosis updated by automated process. Provider to review and confirm.       Sciatica 11/25/2013     Priority: Medium     Left side; 1/13; also see ortho note 2006; had an L5-S1 osteophyte/disc complex with moderate to severe foraminal stenosis L        Chest wall pain      Priority: Medium     see cardiology note; MRI heart nml                 CXR 2/11 normal       Multinodular goiter (nontoxic)      Priority: Medium     bx non-Dx; watery colloid ; not enough cells.         Had a 1 cm nodule in each lobe; bx in 2009; \"in all likelihood benign\" ;    US in 2/14; nodules " "are smaller       Vitamin D deficiency 10/29/2012     Priority: Medium     17 in 11/13; resume vit D  Problem list name updated by automated process. Provider to review       Family history of diabetes mellitus 10/29/2012     Priority: Medium     Arthralgia of temporomandibular joint 10/29/2012     Priority: Medium     left       Anemia 10/29/2012     Priority: Medium     12.7 with ferritin 41 in 10/12; add Fe with each blood donation;                           12.1 and 77 in 11/13, 12.1 and 56 in 12/14  Problem list name updated by automated process. Provider to review       Hearing loss 10/29/2012     Priority: Medium     left  Problem list name updated by automated process. Provider to review       ACP (advance care planning) 09/02/2015     Priority: Low     Advance Care Planning 9/2/2015: ACP Review and Resources Provided:  Reviewed chart for advance care plan.  Nanci Novabrough has no plan or code status on file. Discussed available resources and provided with information. . Added by Nava Wilson               Preventive measure 10/30/2012     Priority: Low     APRIMA DATA BASE UNDER THE 1/21/13 NOTE  Colonoscopy 7/03, 2/14, neg, recheck in 10 yrs;                           Mammogram 12/13;2/15 ,6/17, 11/18                          Pap 10/12;12/14 , 12/15                              plan DXA age 63                 Reviewed and updated as needed this visit by Provider         Review of Systems   ROS COMP: CONSTITUTIONAL:NEGATIVE for fever, chills, change in weight  ENT/MOUTH: NEGATIVE for postnasal drainage, sinus pressure and sneezing  RESP:NEGATIVE for dyspnea on exertion and hemoptysis      Objective    /70 (BP Location: Left arm, Patient Position: Chair, Cuff Size: Adult Large)   Pulse 76   Temp 98.2  F (36.8  C)   Resp 20   Ht 1.6 m (5' 3\")   Wt 87.1 kg (192 lb)   LMP 10/29/1998   SpO2 100%   Breastfeeding? No   BMI 34.01 kg/m    There is no height or weight on file to calculate " "BMI.  Physical Exam   GENERAL APPEARANCE: alert and no distress  HENT: nose and mouth without ulcers or lesions  RESP: lungs clear to auscultation - no rales, rhonchi or wheezes  CV: regular rates and rhythm, normal S1 S2, no S3 or S4, no murmur, click or rub and pitting B/L LE edema to 1+, ankles    Diagnostic Test Results:    None;We have no imaging in the office today and it is already after 5 PM    Assessment & Plan     Nanci was seen today for cough.    Diagnoses and all orders for this visit:    Persistent cough    Bilateral leg edema  -     triamterene-HCTZ (MAXZIDE-25) 37.5-25 MG tablet; Take 1 tablet by mouth daily    Other orders  -     albuterol (PROAIR HFA/PROVENTIL HFA/VENTOLIN HFA) 108 (90 Base) MCG/ACT inhaler; Inhale 2 puffs into the lungs 2 times daily And Q 6 hrs prn         BMI:   Estimated body mass index is 34.01 kg/m  as calculated from the following:    Height as of this encounter: 1.6 m (5' 3\").    Weight as of this encounter: 87.1 kg (192 lb).   Weight management plan: Discussed healthy diet and exercise guidelines      Summary and implications:    She has some persistent cough and occasional wheeze after a respiratory infection.            I don't believe antibiotics are indicated now.                  Patient Instructions   Let's do this:           Get the albuterol inhaler and use 2 puffs twice per day.                   Keep taking Mucinex DM twice per day.                            I have sent an Rx to your pharmacy for a diuretic, to take for one week.             See me on June 29 if your symptoms continue.             She is also advised to decrease her salt intake.  Return in about 3 weeks (around 6/29/2019), or if your symptoms continue.    Thomas Cormier MD  [facility]      "

## 2019-06-10 NOTE — PATIENT INSTRUCTIONS
Let's do this:           Get the albuterol inhaler and use 2 puffs twice per day.                   Keep taking Mucinex DM twice per day.                            I have sent an Rx to your pharmacy for a diuretic, to take for one week.             See me on June 29 if your symptoms continue.

## 2019-06-29 ENCOUNTER — OFFICE VISIT (OUTPATIENT)
Dept: FAMILY MEDICINE | Facility: CLINIC | Age: 64
End: 2019-06-29
Payer: COMMERCIAL

## 2019-06-29 ENCOUNTER — ANCILLARY PROCEDURE (OUTPATIENT)
Dept: GENERAL RADIOLOGY | Facility: CLINIC | Age: 64
End: 2019-06-29
Attending: INTERNAL MEDICINE
Payer: COMMERCIAL

## 2019-06-29 VITALS
BODY MASS INDEX: 34.19 KG/M2 | DIASTOLIC BLOOD PRESSURE: 70 MMHG | SYSTOLIC BLOOD PRESSURE: 120 MMHG | RESPIRATION RATE: 16 BRPM | TEMPERATURE: 98.4 F | HEART RATE: 77 BPM | OXYGEN SATURATION: 99 % | WEIGHT: 193 LBS

## 2019-06-29 DIAGNOSIS — R05.3 PERSISTENT COUGH: ICD-10-CM

## 2019-06-29 DIAGNOSIS — R60.0 BILATERAL LEG EDEMA: ICD-10-CM

## 2019-06-29 DIAGNOSIS — R05.3 PERSISTENT COUGH: Primary | ICD-10-CM

## 2019-06-29 DIAGNOSIS — R06.2 WHEEZING: ICD-10-CM

## 2019-06-29 LAB
FEF 25/75: NORMAL
FEV-1: NORMAL
FEV1/FVC: NORMAL
FVC: NORMAL

## 2019-06-29 PROCEDURE — 94010 BREATHING CAPACITY TEST: CPT | Performed by: INTERNAL MEDICINE

## 2019-06-29 PROCEDURE — 99214 OFFICE O/P EST MOD 30 MIN: CPT | Mod: 25 | Performed by: INTERNAL MEDICINE

## 2019-06-29 PROCEDURE — 71046 X-RAY EXAM CHEST 2 VIEWS: CPT | Mod: FY

## 2019-06-29 RX ORDER — TRIAMTERENE/HYDROCHLOROTHIAZID 37.5-25 MG
0.5 TABLET ORAL DAILY
Qty: 30 TABLET | Refills: 3 | Status: SHIPPED | OUTPATIENT
Start: 2019-06-29 | End: 2020-01-27

## 2019-06-29 RX ORDER — ALBUTEROL SULFATE 90 UG/1
2 AEROSOL, METERED RESPIRATORY (INHALATION) 2 TIMES DAILY
Qty: 8.5 G | Refills: 11 | Status: SHIPPED | OUTPATIENT
Start: 2019-06-29 | End: 2020-09-11

## 2019-06-29 NOTE — PATIENT INSTRUCTIONS
Keep using the albuterol as needed.              Take 1/2 tablet of the diuretic as needed.

## 2019-06-29 NOTE — PROGRESS NOTES
Subjective     Nanci Levy is a 64 year old female who presents to clinic today for the following health issues:    HPI   Follow up      Duration: ongoing    Description (location/character/radiation): pt is here to follow up on her cough.  Pt says she is doing better but is still short of breath once in a while.  Pt would like to have chest xray done today    Intensity:  moderate    Accompanying signs and symptoms: none    History (similar episodes/previous evaluation): None    Precipitating or alleviating factors: None    Therapies tried and outcome: None                          Still occas CALHOUN, and occas wheezing; which can occur at rest, or with exertion.       Using albuterol approx BID.            The diuretic helped; but after 3 doses she felt lightheaded,and stopped taking it.                 Current Outpatient Medications   Medication Sig Dispense Refill     albuterol (PROAIR HFA/PROVENTIL HFA/VENTOLIN HFA) 108 (90 Base) MCG/ACT inhaler Inhale 2 puffs into the lungs 2 times daily And Q 6 hrs prn 8.5 g 1     aspirin 81 MG tablet Take 1 tablet by mouth daily       Cholecalciferol (VITAMIN D) 2000 UNITS tablet Take 2,000 Units by mouth daily 100 tablet 11     triamterene-HCTZ (MAXZIDE-25) 37.5-25 MG tablet Take 1 tablet by mouth daily 7 tablet 0     No Known Allergies  BP Readings from Last 3 Encounters:   06/29/19 120/70   06/10/19 114/70   05/17/19 122/80    Wt Readings from Last 3 Encounters:   06/29/19 87.5 kg (193 lb)   06/10/19 87.1 kg (192 lb)   05/17/19 87.1 kg (192 lb)                      Reviewed and updated as needed this visit by Provider         Review of Systems   ROS COMP: CONSTITUTIONAL:NEGATIVE for fever, chills, change in weight  RESP:NEGATIVE for cough-productive and hemoptysis  CV: NEGATIVE for chest pain/chest pressure and palpitations      Objective    /70   Pulse 77   Temp 98.4  F (36.9  C)   Resp 16   Wt 87.5 kg (193 lb)   LMP 10/29/1998   SpO2 99%   BMI 34.19  "kg/m    Body mass index is 34.19 kg/m .  Physical Exam   GENERAL APPEARANCE: alert, no distress and over weight  RESP: lungs clear to auscultation - no rales, rhonchi or wheezes  CV: regular rates and rhythm, normal S1 S2, no S3 or S4 and no murmur, click or rub;trace peripheral edema    Diagnostic Test Results:  spirometry  shows FEV1 and FVC are normal. The ratio is 72%,which is barely normal. FEF 25-75 equals 65% of predicted  CXR - NAD: Radiology to review.           Assessment & Plan     Nanci was seen today for recheck.    Diagnoses and all orders for this visit:    Persistent cough  -     XR Chest 2 Views; Future  -     Spirometry, Breathing Capacity: Normal Order, Clinic Performed  -     albuterol (PROAIR HFA/PROVENTIL HFA/VENTOLIN HFA) 108 (90 Base) MCG/ACT inhaler; Inhale 2 puffs into the lungs 2 times daily And Q 6 hrs prn    Wheezing  Comments:  spirometry  shows FEV1 and FVC are normal. The ratio is 72%,which is barely normal. FEF 25-75 equals 65% of predicted  Orders:  -     Spirometry, Breathing Capacity: Normal Order, Clinic Performed  -     albuterol (PROAIR HFA/PROVENTIL HFA/VENTOLIN HFA) 108 (90 Base) MCG/ACT inhaler; Inhale 2 puffs into the lungs 2 times daily And Q 6 hrs prn    Bilateral leg edema  -     triamterene-HCTZ (MAXZIDE-25) 37.5-25 MG tablet; Take 0.5 tablets by mouth daily         BMI:   Estimated body mass index is 34.19 kg/m  as calculated from the following:    Height as of 6/10/19: 1.6 m (5' 3\").    Weight as of this encounter: 87.5 kg (193 lb).   Weight management plan: Discussed healthy diet and exercise guidelines      Summary and implications:      Technically, her spirometry is within normal limits. She does have symptoms that suggests she may have intermittent bronchospasm.             We discussed that weight loss would likely help with her exertional capabilities.         She wants to be able to take one half tablet of the diuretic on an occasional basis.  Patient " Instructions   Keep using the albuterol as needed.              Take 1/2 tablet of the diuretic as needed.                        Return in about 4 months (around 10/21/2019) for yearly wellness visit, labs will be needed.    Thomas Cormier MD  [facility]

## 2020-01-27 ENCOUNTER — OFFICE VISIT (OUTPATIENT)
Dept: FAMILY MEDICINE | Facility: CLINIC | Age: 65
End: 2020-01-27
Payer: COMMERCIAL

## 2020-01-27 VITALS
DIASTOLIC BLOOD PRESSURE: 70 MMHG | SYSTOLIC BLOOD PRESSURE: 122 MMHG | HEIGHT: 63 IN | BODY MASS INDEX: 35.26 KG/M2 | OXYGEN SATURATION: 99 % | HEART RATE: 73 BPM | WEIGHT: 199 LBS | RESPIRATION RATE: 20 BRPM

## 2020-01-27 DIAGNOSIS — E78.5 HYPERLIPIDEMIA WITH TARGET LDL LESS THAN 130: ICD-10-CM

## 2020-01-27 DIAGNOSIS — K21.9 GASTROESOPHAGEAL REFLUX DISEASE WITHOUT ESOPHAGITIS: ICD-10-CM

## 2020-01-27 DIAGNOSIS — Z00.00 ROUTINE GENERAL MEDICAL EXAMINATION AT A HEALTH CARE FACILITY: Primary | ICD-10-CM

## 2020-01-27 DIAGNOSIS — E55.9 VITAMIN D DEFICIENCY: ICD-10-CM

## 2020-01-27 DIAGNOSIS — D64.9 ANEMIA, UNSPECIFIED TYPE: ICD-10-CM

## 2020-01-27 DIAGNOSIS — E04.2 MULTINODULAR GOITER (NONTOXIC): ICD-10-CM

## 2020-01-27 DIAGNOSIS — R60.0 BILATERAL LEG EDEMA: ICD-10-CM

## 2020-01-27 LAB
BASOPHILS # BLD AUTO: 0 10E9/L (ref 0–0.2)
BASOPHILS NFR BLD AUTO: 0.1 %
DIFFERENTIAL METHOD BLD: NORMAL
EOSINOPHIL # BLD AUTO: 0.2 10E9/L (ref 0–0.7)
EOSINOPHIL NFR BLD AUTO: 2.1 %
ERYTHROCYTE [DISTWIDTH] IN BLOOD BY AUTOMATED COUNT: 13 % (ref 10–15)
HCT VFR BLD AUTO: 39.5 % (ref 35–47)
HGB BLD-MCNC: 12.6 G/DL (ref 11.7–15.7)
LYMPHOCYTES # BLD AUTO: 2.5 10E9/L (ref 0.8–5.3)
LYMPHOCYTES NFR BLD AUTO: 33 %
MCH RBC QN AUTO: 30.4 PG (ref 26.5–33)
MCHC RBC AUTO-ENTMCNC: 31.9 G/DL (ref 31.5–36.5)
MCV RBC AUTO: 95 FL (ref 78–100)
MONOCYTES # BLD AUTO: 0.5 10E9/L (ref 0–1.3)
MONOCYTES NFR BLD AUTO: 6.9 %
NEUTROPHILS # BLD AUTO: 4.3 10E9/L (ref 1.6–8.3)
NEUTROPHILS NFR BLD AUTO: 57.9 %
PLATELET # BLD AUTO: 247 10E9/L (ref 150–450)
RBC # BLD AUTO: 4.14 10E12/L (ref 3.8–5.2)
WBC # BLD AUTO: 7.5 10E9/L (ref 4–11)

## 2020-01-27 PROCEDURE — 99397 PER PM REEVAL EST PAT 65+ YR: CPT | Performed by: INTERNAL MEDICINE

## 2020-01-27 PROCEDURE — 82306 VITAMIN D 25 HYDROXY: CPT | Performed by: INTERNAL MEDICINE

## 2020-01-27 PROCEDURE — 80061 LIPID PANEL: CPT | Performed by: INTERNAL MEDICINE

## 2020-01-27 PROCEDURE — 36415 COLL VENOUS BLD VENIPUNCTURE: CPT | Performed by: INTERNAL MEDICINE

## 2020-01-27 PROCEDURE — 84443 ASSAY THYROID STIM HORMONE: CPT | Performed by: INTERNAL MEDICINE

## 2020-01-27 PROCEDURE — 99213 OFFICE O/P EST LOW 20 MIN: CPT | Mod: 25 | Performed by: INTERNAL MEDICINE

## 2020-01-27 PROCEDURE — 80053 COMPREHEN METABOLIC PANEL: CPT | Performed by: INTERNAL MEDICINE

## 2020-01-27 PROCEDURE — 85025 COMPLETE CBC W/AUTO DIFF WBC: CPT | Performed by: INTERNAL MEDICINE

## 2020-01-27 RX ORDER — TRIAMTERENE/HYDROCHLOROTHIAZID 37.5-25 MG
0.5 TABLET ORAL DAILY
Qty: 45 TABLET | Refills: 4 | Status: SHIPPED | OUTPATIENT
Start: 2020-01-27 | End: 2020-09-11

## 2020-01-27 ASSESSMENT — ACTIVITIES OF DAILY LIVING (ADL): CURRENT_FUNCTION: NO ASSISTANCE NEEDED

## 2020-01-27 ASSESSMENT — MIFFLIN-ST. JEOR: SCORE: 1411.79

## 2020-01-27 NOTE — PATIENT INSTRUCTIONS
Use the albuterol inhaler before activity, such as walking in cold air.          You can use this up to 4 times per day.                        I have sent an Rx to your pharmacy for a medication to cut down acid reflux.                Take this 15 minutes before breakfast.                                   We are planning to set up a mammogram.

## 2020-01-27 NOTE — PROGRESS NOTES
"SUBJECTIVE:   Nanci Levy is a 64 year old female who presents for Preventive Visit.      Are you in the first 12 months of your Medicare coverage?  No    Healthy Habits:     In general, how would you rate your overall health?  Good    Frequency of exercise:  1 day/week    Duration of exercise:  15-30 minutes    Do you usually eat at least 4 servings of fruit and vegetables a day, include whole grains    & fiber and avoid regularly eating high fat or \"junk\" foods?  Yes    Taking medications regularly:  Yes    Medication side effects:  Not applicable    Ability to successfully perform activities of daily living:  No assistance needed    Home Safety:  No safety concerns identified    Hearing Impairment:  No hearing concerns    In the past 6 months, have you been bothered by leaking of urine?  No    In general, how would you rate your overall mental or emotional health?  Good      PHQ-2 Total Score: 0    Additional concerns today:  Yes    Do you feel safe in your environment? Yes    Have you ever done Advance Care Planning? (For example, a Health Directive, POLST, or a discussion with a medical provider or your loved ones about your wishes): Yes, patient states has an Advance Care Planning document and will bring a copy to the clinic.      Fall risk     1 fall within last year  Cognitive Screening   1) Repeat 3 items (Leader, Season, Table)    2) Clock draw: NORMAL  3) 3 item recall: Recalls 3 objects  Results: 3 items recalled: COGNITIVE IMPAIRMENT LESS LIKELY    Mini-CogTM Copyright [employer]. Licensed by the author for use in [facility]; reprinted with permission (vinicio@.[facility] [place]). All rights reserved.      Do you have sleep apnea, excessive snoring or daytime drowsiness?: no    Reviewed and updated as needed this visit by clinical staff  Tobacco  Allergies  Meds  Med Hx  Surg Hx  Fam Hx  Soc Hx        Reviewed and updated as needed this visit by Provider        Social History     Tobacco " Use     Smoking status: Never Smoker     Smokeless tobacco: Never Used   Substance Use Topics     Alcohol use: No     If you drink alcohol do you typically have >3 drinks per day or >7 drinks per week? No    Alcohol Use 1/27/2020   Prescreen: >3 drinks/day or >7 drinks/week? No   Prescreen: >3 drinks/day or >7 drinks/week? -           Discuss leg pains and some intermittent wheezing.                                                                                                                                                                                                                                                                                                                                 CALHOUN with occas wheezing.            Has stopped using albuterol; used it at first in 6/19.                                      Having some GERD sx.         she is having more leg edema.  She took Maxide for a few weeks, but then stopped it and did not get a refill.                                          She had retired, but has gone back to work.                                     Her mother just had her 99th birthday.                                                                                                                                                                                                                  Current providers sharing in care for this patient include:   Patient Care Team:  Thomas Cormier MD as PCP - General (Internal Medicine)  Thomas Cormier MD as Assigned PCP    The following health maintenance items are reviewed in Epic and correct as of today:  Health Maintenance   Topic Date Due     DEXA  1955     ZOSTER IMMUNIZATION (1 of 2) 04/10/2003     FALL RISK ASSESSMENT  04/10/2018     INFLUENZA VACCINE (1) 09/01/2019     MEDICARE ANNUAL WELLNESS VISIT  09/10/2019     PHQ-2  01/01/2020     PNEUMOCOCCAL IMMUNIZATION 65+ LOW/MEDIUM RISK (2 of 2 - PPSV23) 09/10/2019     ADVANCE CARE  PLANNING  09/02/2020     MAMMO SCREENING  11/06/2020     DTAP/TDAP/TD IMMUNIZATION (3 - Td) 05/24/2023     LIPID  09/10/2023     COLONOSCOPY  02/18/2024     HEPATITIS C SCREENING  Completed     IPV IMMUNIZATION  Aged Out     MENINGITIS IMMUNIZATION  Aged Out     Patient Active Problem List    Diagnosis Date Noted     Wheezing 06/29/2019     Priority: Medium     spirometry  shows FEV1 and FVC are normal. The ratio is 72%,which is barely normal. FEF 25-75 equals 65% of predicted       Bilateral leg edema 06/29/2019     Priority: Medium     Persistent cough 06/29/2019     Priority: Medium     Chest x-ray negative in 6/19       Influenza-like illness 06/10/2019     Priority: Medium     Cervical cancer screening 09/18/2018     Priority: Medium     Pt age 63   Normal paps in  02/25/11: NIL pap  10/29/12: NIL pap  12/2014: NIL pap, + HPV (not 16 or 18). Plan cotest pap & HPV in 1 year.    12/28/15: NIL pap, neg HPV. Plan cotest pap & HPV in 3 years.  09/10/18: NIL pap, neg HR HPV result. Plan routine screening.   No history of MATTHEW 2 or greater found in epic.   No further cervical cancer screening recommended.   Hm updated.              Left facial numbness 02/06/2018     Priority: Medium     MRI brain neg       Paresthesias 02/06/2018     Priority: Medium     left side       Chronic pain of left knee 06/26/2017     Priority: Medium     tried PT x 12 months, no improvement and now daily constant pain.  Should see sports med/ortho again.  Referred.       Thyroid nodule 06/26/2017     Priority: Medium     thyroid US in 2014 showed 2 solid nodules smaller in size.  Nanci thinks more noticable; recheck US today.       Bursitis, knee, left 03/31/2016     Priority: Medium     X-ray indicates patellofemoral arthritis       Paresthesia of left foot 03/31/2016     Priority: Medium     Hyperlipidemia with target LDL less than 130 12/08/2014     Priority: Medium     Diagnosis updated by automated process. Provider to review and  "confirm.       Sciatica 11/25/2013     Priority: Medium     Left side; 1/13; also see ortho note 2006; had an L5-S1 osteophyte/disc complex with moderate to severe foraminal stenosis L        Chest wall pain      Priority: Medium     see cardiology note; MRI heart nml                 CXR 2/11 normal       Multinodular goiter (nontoxic)      Priority: Medium     bx non-Dx; watery colloid ; not enough cells.         Had a 1 cm nodule in each lobe; bx in 2009; \"in all likelihood benign\" ;    US in 2/14; nodules are smaller       Vitamin D deficiency 10/29/2012     Priority: Medium     17 in 11/13; resume vit D  Problem list name updated by automated process. Provider to review       Family history of diabetes mellitus 10/29/2012     Priority: Medium     Arthralgia of temporomandibular joint 10/29/2012     Priority: Medium     left       Anemia 10/29/2012     Priority: Medium     12.7 with ferritin 41 in 10/12; add Fe with each blood donation;                           12.1 and 77 in 11/13, 12.1 and 56 in 12/14  Problem list name updated by automated process. Provider to review       Hearing loss 10/29/2012     Priority: Medium     left  Problem list name updated by automated process. Provider to review       ACP (advance care planning) 09/02/2015     Priority: Low     Advance Care Planning 9/2/2015: ACP Review and Resources Provided:  Reviewed chart for advance care plan.  Nanci Novabrough has no plan or code status on file. Discussed available resources and provided with information. . Added by Nava Wilson               Preventive measure 10/30/2012     Priority: Low     APRIMA DATA BASE UNDER THE 1/21/13 NOTE  Colonoscopy 7/03, 2/14, neg, recheck in 10 yrs;                           Mammogram 12/13;2/15 ,6/17, 11/18                          Pap 10/12;12/14 , 12/15                              plan DXA age 63       Past Surgical History:   Procedure Laterality Date     HYSTERECTOMY  08/1997    supra cervical " abdominal hysterectomy; ovaries remain     Social History     Socioeconomic History     Marital status: Single     Spouse name: Not on file     Number of children: Not on file     Years of education: Not on file     Highest education level: Not on file   Occupational History     Not on file   Social Needs     Financial resource strain: Not on file     Food insecurity:     Worry: Not on file     Inability: Not on file     Transportation needs:     Medical: Not on file     Non-medical: Not on file   Tobacco Use     Smoking status: Never Smoker     Smokeless tobacco: Never Used   Substance and Sexual Activity     Alcohol use: No     Drug use: No     Sexual activity: Yes     Partners: Male   Lifestyle     Physical activity:     Days per week: Not on file     Minutes per session: Not on file     Stress: Not on file   Relationships     Social connections:     Talks on phone: Not on file     Gets together: Not on file     Attends [employer] service: Not on file     Active member of club or organization: Not on file     Attends meetings of clubs or organizations: Not on file     Relationship status: Not on file     Intimate partner violence:     Fear of current or ex partner: Not on file     Emotionally abused: Not on file     Physically abused: Not on file     Forced sexual activity: Not on file   Other Topics Concern     Parent/sibling w/ CABG, MI or angioplasty before 65F 55M? Yes   Social History Narrative     Not on file     Immunization History   Administered Date(s) Administered     Pneumo Conj 13-V (2010&after) 09/10/2018     TD (ADULT, 7+) 06/30/2003     TDAP Vaccine (Adacel) 05/24/2013       BP Readings from Last 3 Encounters:   01/27/20 122/70   06/29/19 120/70   06/10/19 114/70    Wt Readings from Last 3 Encounters:   01/27/20 90.3 kg (199 lb)   06/29/19 87.5 kg (193 lb)   06/10/19 87.1 kg (192 lb)                  Current Outpatient Medications   Medication Sig Dispense Refill     albuterol (PROAIR HFA/PROVENTIL  "HFA/VENTOLIN HFA) 108 (90 Base) MCG/ACT inhaler Inhale 2 puffs into the lungs 2 times daily And Q 6 hrs prn 8.5 g 11     aspirin 81 MG tablet Take 1 tablet by mouth daily       Cholecalciferol (VITAMIN D) 2000 UNITS tablet Take 2,000 Units by mouth daily 100 tablet 11     triamterene-HCTZ (MAXZIDE-25) 37.5-25 MG tablet Take 0.5 tablets by mouth daily 30 tablet 3     No Known Allergies      Review of Systems  CONSTITUTIONAL: NEGATIVE for fever, chills, change in weight  INTEGUMENTARY/SKIN: NEGATIVE for worrisome rashes, moles or lesions  EYES: NEGATIVE for vision changes or irritation  ENT/MOUTH: NEGATIVE for ear, mouth and throat problems  RESP:NEGATIVE for cough-productive and hemoptysis  BREAST: NEGATIVE for masses, tenderness or discharge  CV: NEGATIVE for chest pain/chest pressure and palpitations  GI: NEGATIVE for hematochezia and melena  : NEGATIVE for frequency, dysuria, or hematuria  MUSCULOSKELETAL: NEGATIVE for significant arthralgias or myalgia  NEURO: NEGATIVE for weakness, dizziness or paresthesias  ENDOCRINE: NEGATIVE for temperature intolerance, skin/hair changes  HEME: NEGATIVE for bleeding problems  PSYCHIATRIC: NEGATIVE for changes in mood or affect    OBJECTIVE:   LMP 10/29/1998  Estimated body mass index is 34.19 kg/m  as calculated from the following:    Height as of 6/10/19: 1.6 m (5' 3\").    Weight as of 6/29/19: 87.5 kg (193 lb).  Physical Exam  GENERAL APPEARANCE: alert, no distress and over weight  EYES: Eyes grossly normal to inspection  HENT: ear canals and TM's normal, nose and mouth without ulcers or lesions, oropharynx clear and oral mucous membranes moist  NECK: no adenopathy, no asymmetry, masses, or scars and thyroid normal to palpation  RESP: no rales  and no rhonchi and no wheezing  BREAST: normal without masses, tenderness or nipple discharge and no palpable axillary masses or adenopathy  CV: regular rates and rhythm, normal S1 S2, no S3 or S4, no murmur, click or rub, " peripheral pulses strong and 1+ bilateral lower extremity pitting edema     ABDOMEN: soft, nontender, no hepatosplenomegaly and no masses  MS: no musculoskeletal defects are noted and gait is age appropriate without ataxia  SKIN: no suspicious lesions or rashes  NEURO: Normal strength and tone, mentation intact and speech normal  PSYCH: mentation appears normal and affect normal/bright    Diagnostic Test Results:  Pending; nonfasting labs    ASSESSMENT / PLAN:   Nanci was seen today for physical.    Diagnoses and all orders for this visit:    Routine general medical examination at a health care facility    Bilateral leg edema  -     Comprehensive metabolic panel (BMP + Alb, Alk Phos, ALT, AST, Total. Bili, TP)  -     triamterene-HCTZ (MAXZIDE-25) 37.5-25 MG tablet; Take 0.5 tablets by mouth daily    Vitamin D deficiency  -     Vitamin D Deficiency    Hyperlipidemia with target LDL less than 130  -     Lipid Profile (Chol, Trig, HDL, LDL calc)    Multinodular goiter (nontoxic)  -     TSH with free T4 reflex    Anemia, unspecified type  -     CBC with platelets and differential    Gastroesophageal reflux disease without esophagitis  -     esomeprazole (NEXIUM) 20 MG DR capsule; Take 1 capsule (20 mg) by mouth every morning (before breakfast) Take 30-60 minutes before eating.       Summary and implications:  We reviewed multiple issues.           We reviewed all of the issues on the diagnoses list.           Check labs and adjust medications as indicated; nonfasting labs.  We discussed using albuterol before activity.  We also discussed switching to an alternate inhaler, such as      a long-acting beta agonist/corticosteroid inhaler.      We also discussed treating her acid reflux, which can help with          controlling her asthma symptoms as well.                              Resume the diuretic.        Patient Instructions   Use the albuterol inhaler before activity, such as walking in cold air.          You can  "use this up to 4 times per day.                        I have sent an Rx to your pharmacy for a medication to cut down acid reflux.                Take this 15 minutes before breakfast.                                   We are planning to set up a mammogram.           Return in about 3 months (around 4/27/2020) for follow up of several issues.      COUNSELING:  Reviewed preventive health counseling, as reflected in patient instructions       Regular exercise       Healthy diet/nutrition    Estimated body mass index is 34.19 kg/m  as calculated from the following:    Height as of 6/10/19: 1.6 m (5' 3\").    Weight as of 6/29/19: 87.5 kg (193 lb).    Weight management plan: Discussed healthy diet and exercise guidelines     reports that she has never smoked. She has never used smokeless tobacco.      Appropriate preventive services were discussed with this patient, including applicable screening as appropriate for cardiovascular disease, diabetes, osteopenia/osteoporosis, and glaucoma.  As appropriate for age/gender, discussed screening for colorectal cancer, prostate cancer, breast cancer, and cervical cancer. Checklist reviewing preventive services available has been given to the patient.    Reviewed patients plan of care and provided an AVS. The Basic Care Plan (routine screening as documented in Health Maintenance) for Nanci meets the Care Plan requirement. This Care Plan has been established and reviewed with the Patient.    Counseling Resources:  ATP IV Guidelines  Pooled Cohorts Equation Calculator  Breast Cancer Risk Calculator  FRAX Risk Assessment  ICSI Preventive Guidelines  Dietary Guidelines for Americans, 2010  USDA's MyPlate  ASA Prophylaxis  Lung CA Screening    Thomas Cormier MD  [facility]    Results for orders placed or performed in visit on 01/27/20   Comprehensive metabolic panel (BMP + Alb, Alk Phos, ALT, AST, Total. Bili, TP)     Status: None   Result Value " Ref Range    Sodium 138 133 - 144 mmol/L    Potassium 4.2 3.4 - 5.3 mmol/L    Chloride 105 94 - 109 mmol/L    Carbon Dioxide 27 20 - 32 mmol/L    Anion Gap 6 3 - 14 mmol/L    Glucose 94 70 - 99 mg/dL    Urea Nitrogen 11 7 - 30 mg/dL    Creatinine 0.74 0.52 - 1.04 mg/dL    GFR Estimate 85 >60 mL/min/[1.73_m2]    GFR Estimate If Black >90 >60 mL/min/[1.73_m2]    Calcium 9.2 8.5 - 10.1 mg/dL    Bilirubin Total 0.5 0.2 - 1.3 mg/dL    Albumin 3.7 3.4 - 5.0 g/dL    Protein Total 7.6 6.8 - 8.8 g/dL    Alkaline Phosphatase 130 40 - 150 U/L    ALT 23 0 - 50 U/L    AST 16 0 - 45 U/L   Lipid Profile (Chol, Trig, HDL, LDL calc)     Status: Abnormal   Result Value Ref Range    Cholesterol 203 (H) <200 mg/dL    Triglycerides 87 <150 mg/dL    HDL Cholesterol 54 >49 mg/dL    LDL Cholesterol Calculated 132 (H) <100 mg/dL    Non HDL Cholesterol 149 (H) <130 mg/dL   TSH with free T4 reflex     Status: None   Result Value Ref Range    TSH 1.11 0.40 - 4.00 mU/L   CBC with platelets and differential     Status: None   Result Value Ref Range    WBC 7.5 4.0 - 11.0 10e9/L    RBC Count 4.14 3.8 - 5.2 10e12/L    Hemoglobin 12.6 11.7 - 15.7 g/dL    Hematocrit 39.5 35.0 - 47.0 %    MCV 95 78 - 100 fl    MCH 30.4 26.5 - 33.0 pg    MCHC 31.9 31.5 - 36.5 g/dL    RDW 13.0 10.0 - 15.0 %    Platelet Count 247 150 - 450 10e9/L    % Neutrophils 57.9 %    % Lymphocytes 33.0 %    % Monocytes 6.9 %    % Eosinophils 2.1 %    % Basophils 0.1 %    Absolute Neutrophil 4.3 1.6 - 8.3 10e9/L    Absolute Lymphocytes 2.5 0.8 - 5.3 10e9/L    Absolute Monocytes 0.5 0.0 - 1.3 10e9/L    Absolute Eosinophils 0.2 0.0 - 0.7 10e9/L    Absolute Basophils 0.0 0.0 - 0.2 10e9/L    Diff Method Automated Method    Vitamin D Deficiency     Status: Abnormal   Result Value Ref Range    Vitamin D Deficiency screening 9 (L) 20 - 75 ug/L           Letter sent.                 The vitamin D level is very low. Please add another 2,000 IU of vitamin D per day; you do not need an Rx for  this.              Your other lab results are normal or stable,including the liver,kidney, glucose, thyroid, bone marrow, and the cholesterol levels.

## 2020-01-27 NOTE — LETTER
January 28, 2020      Nanci Levy  [address] [facility] [address]        Dear ,    We are writing to inform you of your test results.          The vitamin D level is very low. Please add another 2,000 IU of vitamin D per day; you do not need an Rx for this.              Your other lab results are normal or stable,including the liver,kidney, glucose, thyroid, bone marrow, and the cholesterol levels.    Results for orders placed or performed in visit on 01/27/20   Comprehensive metabolic panel (BMP + Alb, Alk Phos, ALT, AST, Total. Bili, TP)     Status: None   Result Value Ref Range    Sodium 138 133 - 144 mmol/L    Potassium 4.2 3.4 - 5.3 mmol/L    Chloride 105 94 - 109 mmol/L    Carbon Dioxide 27 20 - 32 mmol/L    Anion Gap 6 3 - 14 mmol/L    Glucose 94 70 - 99 mg/dL    Urea Nitrogen 11 7 - 30 mg/dL    Creatinine 0.74 0.52 - 1.04 mg/dL    GFR Estimate 85 >60 mL/min/[1.73_m2]    GFR Estimate If Black >90 >60 mL/min/[1.73_m2]    Calcium 9.2 8.5 - 10.1 mg/dL    Bilirubin Total 0.5 0.2 - 1.3 mg/dL    Albumin 3.7 3.4 - 5.0 g/dL    Protein Total 7.6 6.8 - 8.8 g/dL    Alkaline Phosphatase 130 40 - 150 U/L    ALT 23 0 - 50 U/L    AST 16 0 - 45 U/L   Lipid Profile (Chol, Trig, HDL, LDL calc)     Status: Abnormal   Result Value Ref Range    Cholesterol 203 (H) <200 mg/dL    Triglycerides 87 <150 mg/dL    HDL Cholesterol 54 >49 mg/dL    LDL Cholesterol Calculated 132 (H) <100 mg/dL    Non HDL Cholesterol 149 (H) <130 mg/dL   TSH with free T4 reflex     Status: None   Result Value Ref Range    TSH 1.11 0.40 - 4.00 mU/L   CBC with platelets and differential     Status: None   Result Value Ref Range    WBC 7.5 4.0 - 11.0 10e9/L    RBC Count 4.14 3.8 - 5.2 10e12/L    Hemoglobin 12.6 11.7 - 15.7 g/dL    Hematocrit 39.5 35.0 - 47.0 %    MCV 95 78 - 100 fl    MCH 30.4 26.5 - 33.0 pg    MCHC 31.9 31.5 - 36.5 g/dL    RDW 13.0 10.0 - 15.0 %    Platelet Count 247 150 - 450 10e9/L    % Neutrophils 57.9 %     % Lymphocytes 33.0 %    % Monocytes 6.9 %    % Eosinophils 2.1 %    % Basophils 0.1 %    Absolute Neutrophil 4.3 1.6 - 8.3 10e9/L    Absolute Lymphocytes 2.5 0.8 - 5.3 10e9/L    Absolute Monocytes 0.5 0.0 - 1.3 10e9/L    Absolute Eosinophils 0.2 0.0 - 0.7 10e9/L    Absolute Basophils 0.0 0.0 - 0.2 10e9/L    Diff Method Automated Method    Vitamin D Deficiency     Status: Abnormal   Result Value Ref Range    Vitamin D Deficiency screening 9 (L) 20 - 75 ug/L         If you have any questions or concerns, please call the clinic at the number listed above.       Sincerely,        Thomas Cormier MD

## 2020-01-28 LAB
ALBUMIN SERPL-MCNC: 3.7 G/DL (ref 3.4–5)
ALP SERPL-CCNC: 130 U/L (ref 40–150)
ALT SERPL W P-5'-P-CCNC: 23 U/L (ref 0–50)
ANION GAP SERPL CALCULATED.3IONS-SCNC: 6 MMOL/L (ref 3–14)
AST SERPL W P-5'-P-CCNC: 16 U/L (ref 0–45)
BILIRUB SERPL-MCNC: 0.5 MG/DL (ref 0.2–1.3)
BUN SERPL-MCNC: 11 MG/DL (ref 7–30)
CALCIUM SERPL-MCNC: 9.2 MG/DL (ref 8.5–10.1)
CHLORIDE SERPL-SCNC: 105 MMOL/L (ref 94–109)
CHOLEST SERPL-MCNC: 203 MG/DL
CO2 SERPL-SCNC: 27 MMOL/L (ref 20–32)
CREAT SERPL-MCNC: 0.74 MG/DL (ref 0.52–1.04)
DEPRECATED CALCIDIOL+CALCIFEROL SERPL-MC: 9 UG/L (ref 20–75)
GFR SERPL CREATININE-BSD FRML MDRD: 85 ML/MIN/{1.73_M2}
GLUCOSE SERPL-MCNC: 94 MG/DL (ref 70–99)
HDLC SERPL-MCNC: 54 MG/DL
LDLC SERPL CALC-MCNC: 132 MG/DL
NONHDLC SERPL-MCNC: 149 MG/DL
POTASSIUM SERPL-SCNC: 4.2 MMOL/L (ref 3.4–5.3)
PROT SERPL-MCNC: 7.6 G/DL (ref 6.8–8.8)
SODIUM SERPL-SCNC: 138 MMOL/L (ref 133–144)
TRIGL SERPL-MCNC: 87 MG/DL
TSH SERPL DL<=0.005 MIU/L-ACNC: 1.11 MU/L (ref 0.4–4)

## 2020-02-06 ENCOUNTER — HOSPITAL ENCOUNTER (OUTPATIENT)
Dept: MAMMOGRAPHY | Facility: CLINIC | Age: 65
Discharge: HOME | End: 2020-02-06
Attending: INTERNAL MEDICINE | Admitting: INTERNAL MEDICINE
Payer: COMMERCIAL

## 2020-02-06 DIAGNOSIS — Z12.31 VISIT FOR SCREENING MAMMOGRAM: ICD-10-CM

## 2020-02-06 PROCEDURE — 77063 BREAST TOMOSYNTHESIS BI: CPT

## 2020-02-14 ENCOUNTER — HOSPITAL ENCOUNTER (OUTPATIENT)
Dept: MAMMOGRAPHY | Facility: CLINIC | Age: 65
Discharge: HOME | End: 2020-02-14
Attending: INTERNAL MEDICINE | Admitting: INTERNAL MEDICINE
Payer: COMMERCIAL

## 2020-02-14 DIAGNOSIS — R92.8 ABNORMAL MAMMOGRAM: ICD-10-CM

## 2020-02-14 PROCEDURE — 76642 ULTRASOUND BREAST LIMITED: CPT | Mod: RT

## 2020-09-11 ENCOUNTER — OFFICE VISIT (OUTPATIENT)
Dept: FAMILY MEDICINE | Facility: CLINIC | Age: 65
End: 2020-09-11
Payer: COMMERCIAL

## 2020-09-11 VITALS
BODY MASS INDEX: 35.43 KG/M2 | HEART RATE: 76 BPM | DIASTOLIC BLOOD PRESSURE: 72 MMHG | OXYGEN SATURATION: 100 % | RESPIRATION RATE: 14 BRPM | WEIGHT: 200 LBS | SYSTOLIC BLOOD PRESSURE: 142 MMHG | TEMPERATURE: 98.6 F

## 2020-09-11 DIAGNOSIS — R06.2 WHEEZING: ICD-10-CM

## 2020-09-11 DIAGNOSIS — J45.990 EXERCISE-INDUCED ASTHMA: ICD-10-CM

## 2020-09-11 DIAGNOSIS — R30.0 DYSURIA: Primary | ICD-10-CM

## 2020-09-11 DIAGNOSIS — K21.9 GASTROESOPHAGEAL REFLUX DISEASE WITHOUT ESOPHAGITIS: ICD-10-CM

## 2020-09-11 DIAGNOSIS — Z78.0 MENOPAUSE: ICD-10-CM

## 2020-09-11 DIAGNOSIS — M62.838 MUSCLE SPASM: ICD-10-CM

## 2020-09-11 LAB
ALBUMIN UR-MCNC: NEGATIVE MG/DL
APPEARANCE UR: CLEAR
BILIRUB UR QL STRIP: NEGATIVE
COLOR UR AUTO: YELLOW
GLUCOSE UR STRIP-MCNC: NEGATIVE MG/DL
HGB UR QL STRIP: NEGATIVE
KETONES UR STRIP-MCNC: ABNORMAL MG/DL
LEUKOCYTE ESTERASE UR QL STRIP: NEGATIVE
NITRATE UR QL: NEGATIVE
PH UR STRIP: 5 PH (ref 5–7)
RBC #/AREA URNS AUTO: ABNORMAL /HPF
SOURCE: ABNORMAL
SP GR UR STRIP: >1.03 (ref 1–1.03)
UROBILINOGEN UR STRIP-ACNC: 0.2 EU/DL (ref 0.2–1)
WBC #/AREA URNS AUTO: ABNORMAL /HPF

## 2020-09-11 PROCEDURE — 81001 URINALYSIS AUTO W/SCOPE: CPT | Performed by: INTERNAL MEDICINE

## 2020-09-11 PROCEDURE — 99214 OFFICE O/P EST MOD 30 MIN: CPT | Performed by: INTERNAL MEDICINE

## 2020-09-11 RX ORDER — ALBUTEROL SULFATE 90 UG/1
2 AEROSOL, METERED RESPIRATORY (INHALATION) 2 TIMES DAILY
Qty: 8.5 G | Refills: 11 | Status: SHIPPED | OUTPATIENT
Start: 2020-09-11 | End: 2022-08-09

## 2020-09-11 RX ORDER — ALBUTEROL SULFATE 90 UG/1
2 AEROSOL, METERED RESPIRATORY (INHALATION) 2 TIMES DAILY
Qty: 8.5 G | Refills: 11 | Status: SHIPPED | OUTPATIENT
Start: 2020-09-11 | End: 2020-09-11

## 2020-09-11 RX ORDER — ESTRADIOL 0.1 MG/G
2 CREAM VAGINAL
Qty: 42.5 G | Refills: 0 | Status: SHIPPED | OUTPATIENT
Start: 2020-09-14 | End: 2021-06-08

## 2020-09-11 RX ORDER — TIZANIDINE HYDROCHLORIDE 4 MG/1
4 CAPSULE, GELATIN COATED ORAL 3 TIMES DAILY
Qty: 30 CAPSULE | Refills: 0 | Status: SHIPPED | OUTPATIENT
Start: 2020-09-11 | End: 2021-04-08

## 2020-09-11 NOTE — PATIENT INSTRUCTIONS
Medications sent to your pharmacy for Acid reflux, Menopause symptoms, Muscle relaxor and also Inhaler for your asthma.     Please check your BP for next 5 days and we will decide whether to start on your water pil.     Wear compression stockings for your dependant edema.     =========================    Patient Education     GERD (Adult)    The esophagus is a tube that carries food from the mouth to the stomach. A valve (the LES, lower esophageal sphincter) at the lower end of the esophagus prevents stomach acid from flowing upward. When this valve doesn't work properly, stomach contents may repeatedly flow back up (reflux) into the esophagus. This is called gastroesophageal reflux disease (GERD). GERD can irritate the esophagus. It can cause problems with pain, swallowing or breathing. In severe cases, GERD can cause recurrent pneumonia (from aspiration or breathing in particles) or other serious problems.  Symptoms of reflux include burning, pressure or sharp pain in the upper abdomen or mid to lower chest. The pain can spread to the neck, back, or shoulder. There may be belching, an acid taste in the back of the throat, chronic cough, or sore throat, or hoarseness. GERD symptoms often occur during the day after a big meal. They can also occur at night when lying down.   Home care  Lifestyle changes can help reduce symptoms. If needed, your healthcare provider may prescribe medicines. Symptoms often improve with treatment, but if treatment is stopped, the symptoms often return after a few months. So most persons with GERD will need to continue treatment or get treatment on and off.  Lifestyle changes    Limit or avoid fatty, fried, and spicy foods, as well as coffee, chocolate, mint, and foods with high acid content such as tomatoes and citrus fruit and juices (orange, grapefruit, lemon).    Don t eat large meals, especially at night. Frequent, smaller meals are best. Don't lie down right after eating. And don t  "eat anything 3 hours before going to bed.    Don't drink alcohol or smoke. As much as possible, stay away from second hand smoke.    If you are overweight, losing weight will reduce symptoms.     Don't wear tight clothing around your stomach area.    If your symptoms occur during sleep, use a foam wedge to elevate your upper body (not just your head.) Or, place 4\" blocks under the head of your bed. Or use 2 bed risers under your bedframe.  Medicines  If needed, medicines can help relieve the symptoms of GERD and prevent damage to the esophagus. Discuss a medicine plan with your healthcare provider. This may include one or more of the following medicines:    Antacids to help neutralize the normal acids in your stomach.    Acid blockers (Histamine or H2 blockers) to decrease acid production.    Acid inhibitors (proton pump inhibitors PPIs) to decrease acid production in a different way than the blockers. They may work better, but can take a little longer to take effect.  Take an antacid 30 to 60 minutes after eating and at bedtime, but not at the same time as an acid blocker.  Try not to take medicines such as ibuprofen and aspirin. If you are taking aspirin for your heart or other medical reasons, talk to your healthcare provider about stopping it.  Follow-up care  Follow up with your healthcare provider or as advised by our staff.  When to seek medical advice  Call your healthcare provider if any of the following occur:    Stomach pain gets worse or moves to the lower right abdomen (appendix area)    Chest pain appears or gets worse, or spreads to the back, neck, shoulder, or arm    An over-the-counter trial of medicine doesn't relieve your symptoms    Weight loss that can't be explained    Trouble or pain swallowing    Frequent vomiting (can t keep down liquids)    Blood in the stool or vomit (red or black in color)    Feeling weak or dizzy    Fever of 100.4 F (38 C) or higher, or as directed by your healthcare " provider  Date Last Reviewed: 3/1/2018    9031-2658 [employer] [address] [place] [address], [place] [address]. All rights reserved. This information is not intended as a substitute for professional medical care. Always follow your healthcare professional's instructions.

## 2020-09-11 NOTE — PROGRESS NOTES
Subjective     Nanci Levy is a 65 year old female who presents to clinic today for the following health issues:    HPI       Genitourinary - Female  Onset/Duration: X7+ days  Description:   Painful urination (Dysuria): no           Frequency: YES  Blood in urine (Hematuria): no  Delay in urine (Hesitency): no  Intensity: mild  Progression of Symptoms:  same  Accompanying Signs & Symptoms:  Fever/chills: no  Flank pain: YES  Nausea and vomiting: YES--nausea  Vaginal symptoms: none  Abdominal/Pelvic Pain: YES  History:   History of frequent UTI s: no  History of kidney stones: no  Sexually Active: YES  Possibility of pregnancy: No  Precipitating or alleviating factors: None  Therapies tried and outcome: Increase fluid intake        No Known Allergies     Past Medical History:   Diagnosis Date     Arthritis      Cervical high risk HPV (human papillomavirus) test positive 12/2014    nil pap, + HPV (not 16 or 18)     Chest wall pain 2/10    see cardiology note; MRI heart nml     Exercise-induced asthma 9/11/2020     Multinodular goiter (nontoxic)     bx non-Dx; watery colloid ; not enough cells       Past Surgical History:   Procedure Laterality Date     HYSTERECTOMY  08/1997    supra cervical abdominal hysterectomy; ovaries remain       Family History   Problem Relation Age of Onset     Diabetes Mother         Mechelle Tatum     Lipids Brother      Heart Disease Brother        Social History     Tobacco Use     Smoking status: Never Smoker     Smokeless tobacco: Never Used   Substance Use Topics     Alcohol use: No        Current Outpatient Medications   Medication     albuterol (PROAIR HFA/PROVENTIL HFA/VENTOLIN HFA) 108 (90 Base) MCG/ACT inhaler     aspirin 81 MG tablet     Cholecalciferol (VITAMIN D) 2000 UNITS tablet     estradiol (ESTRACE) 0.1 MG/GM vaginal cream     omeprazole (PRILOSEC) 20 MG DR capsule     tiZANidine (ZANAFLEX) 4 MG capsule     No current facility-administered medications for this  visit.           Review of Systems   Constitutional, HEENT, cardiovascular, pulmonary, GI, , musculoskeletal, neuro, skin, endocrine and psych systems are negative, except as otherwise noted.      Objective    BP (P) 136/78   Pulse 76   Temp 98.6  F (37  C) (Tympanic)   Resp 14   Wt 90.7 kg (200 lb)   LMP 10/29/1998   SpO2 100%   Breastfeeding No   BMI 35.43 kg/m    Body mass index is 35.43 kg/m .  Physical Exam   GENERAL: healthy, alert and no distress  NECK: no adenopathy, no asymmetry, masses, or scars and thyroid normal to palpation  RESP: lungs clear to auscultation - no rales, rhonchi or wheezes  CV: regular rate and rhythm, normal S1 S2, no S3 or S4, no murmur, click or rub, no peripheral edema and peripheral pulses strong  ABDOMEN: soft, nontender, no hepatosplenomegaly, no masses and bowel sounds normal  MS: no gross musculoskeletal defects noted, no edema    Labs and imaging reviewed and discussed with the patient.        Assessment and Plan  1. Dysuria  2. Menopause  New problem, UA negative for UTI. Pt atrophic vaginitis causing the symptoms. Will give estrogen Vaginal cream. Complications discussed, pt doesn't have any risk factors in her or family.   - estradiol (ESTRACE) 0.1 MG/GM vaginal cream; Place 2 g vaginally twice a week  Dispense: 42.5 g; Refill: 0  - UA with Microscopic reflex to Culture    2. Gastroesophageal reflux disease without esophagitis  Uncontrolled as pt not taking her PPI which she is supposed to be on. Will restart on PPI. Plans of EGD if no improvement in 4 weeks which pt understood and agreed.  - omeprazole (PRILOSEC) 20 MG DR capsule; Take 1 capsule (20 mg) by mouth daily  Dispense: 30 capsule; Refill: 0    3. Exercise-induced asthma  4. Wheezing  - albuterol (PROAIR HFA/PROVENTIL HFA/VENTOLIN HFA) 108 (90 Base) MCG/ACT inhaler; Inhale 2 puffs into the lungs 2 times daily And Q 6 hrs prn  Dispense: 8.5 g; Refill: 11      6. Muscle spasm  - tiZANidine (ZANAFLEX) 4 MG  capsule; Take 1 capsule (4 mg) by mouth 3 times daily  Dispense: 30 capsule; Refill: 0     Patient Instructions   Medications sent to your pharmacy for Acid reflux, Menopause symptoms, Muscle relaxor and also Inhaler for your asthma.     Please check your BP for next 5 days and we will decide whether to start on your water pil.     Wear compression stockings for your dependant edema.     =========================    Patient Education     GERD (Adult)    The esophagus is a tube that carries food from the mouth to the stomach. A valve (the LES, lower esophageal sphincter) at the lower end of the esophagus prevents stomach acid from flowing upward. When this valve doesn't work properly, stomach contents may repeatedly flow back up (reflux) into the esophagus. This is called gastroesophageal reflux disease (GERD). GERD can irritate the esophagus. It can cause problems with pain, swallowing or breathing. In severe cases, GERD can cause recurrent pneumonia (from aspiration or breathing in particles) or other serious problems.  Symptoms of reflux include burning, pressure or sharp pain in the upper abdomen or mid to lower chest. The pain can spread to the neck, back, or shoulder. There may be belching, an acid taste in the back of the throat, chronic cough, or sore throat, or hoarseness. GERD symptoms often occur during the day after a big meal. They can also occur at night when lying down.   Home care  Lifestyle changes can help reduce symptoms. If needed, your healthcare provider may prescribe medicines. Symptoms often improve with treatment, but if treatment is stopped, the symptoms often return after a few months. So most persons with GERD will need to continue treatment or get treatment on and off.  Lifestyle changes    Limit or avoid fatty, fried, and spicy foods, as well as coffee, chocolate, mint, and foods with high acid content such as tomatoes and citrus fruit and juices (orange, grapefruit, lemon).    Don t eat  "large meals, especially at night. Frequent, smaller meals are best. Don't lie down right after eating. And don t eat anything 3 hours before going to bed.    Don't drink alcohol or smoke. As much as possible, stay away from second hand smoke.    If you are overweight, losing weight will reduce symptoms.     Don't wear tight clothing around your stomach area.    If your symptoms occur during sleep, use a foam wedge to elevate your upper body (not just your head.) Or, place 4\" blocks under the head of your bed. Or use 2 bed risers under your bedframe.  Medicines  If needed, medicines can help relieve the symptoms of GERD and prevent damage to the esophagus. Discuss a medicine plan with your healthcare provider. This may include one or more of the following medicines:    Antacids to help neutralize the normal acids in your stomach.    Acid blockers (Histamine or H2 blockers) to decrease acid production.    Acid inhibitors (proton pump inhibitors PPIs) to decrease acid production in a different way than the blockers. They may work better, but can take a little longer to take effect.  Take an antacid 30 to 60 minutes after eating and at bedtime, but not at the same time as an acid blocker.  Try not to take medicines such as ibuprofen and aspirin. If you are taking aspirin for your heart or other medical reasons, talk to your healthcare provider about stopping it.  Follow-up care  Follow up with your healthcare provider or as advised by our staff.  When to seek medical advice  Call your healthcare provider if any of the following occur:    Stomach pain gets worse or moves to the lower right abdomen (appendix area)    Chest pain appears or gets worse, or spreads to the back, neck, shoulder, or arm    An over-the-counter trial of medicine doesn't relieve your symptoms    Weight loss that can't be explained    Trouble or pain swallowing    Frequent vomiting (can t keep down liquids)    Blood in the stool or vomit (red or " black in color)    Feeling weak or dizzy    Fever of 100.4 F (38 C) or higher, or as directed by your healthcare provider  Date Last Reviewed: 3/1/2018    8579-6232 [employer]. [address] [place] [address], [place] [address]. All rights reserved. This information is not intended as a substitute for professional medical care. Always follow your healthcare professional's instructions.             Return in about 3 months (around 12/11/2020), or if symptoms worsen or fail to improve, for Follow up of last visit.    Magda Cyr MD  [facility]

## 2021-03-28 ENCOUNTER — TELEPHONE (OUTPATIENT)
Dept: FAMILY MEDICINE | Facility: CLINIC | Age: 66
End: 2021-03-28

## 2021-03-28 NOTE — TELEPHONE ENCOUNTER
Please call the patient and schedule AWV which patient is due at this time, to further take care of the medical conditions and medications. Last seen by me in 9/2020.      Thank you,  Magda Cyr MD on 3/28/2021 at 6:25 PM

## 2021-04-06 ENCOUNTER — HOSPITAL ENCOUNTER (OUTPATIENT)
Dept: MAMMOGRAPHY | Facility: CLINIC | Age: 66
Discharge: HOME | End: 2021-04-06
Attending: INTERNAL MEDICINE | Admitting: INTERNAL MEDICINE
Payer: COMMERCIAL

## 2021-04-06 DIAGNOSIS — Z12.31 VISIT FOR SCREENING MAMMOGRAM: ICD-10-CM

## 2021-04-06 PROBLEM — J11.1 INFLUENZA-LIKE ILLNESS: Status: RESOLVED | Noted: 2019-06-10 | Resolved: 2021-04-06

## 2021-04-06 PROCEDURE — 77063 BREAST TOMOSYNTHESIS BI: CPT

## 2021-04-08 ENCOUNTER — OFFICE VISIT (OUTPATIENT)
Dept: INTERNAL MEDICINE | Facility: CLINIC | Age: 66
End: 2021-04-08
Payer: COMMERCIAL

## 2021-04-08 VITALS
SYSTOLIC BLOOD PRESSURE: 120 MMHG | BODY MASS INDEX: 34.8 KG/M2 | OXYGEN SATURATION: 97 % | WEIGHT: 196.4 LBS | HEART RATE: 84 BPM | DIASTOLIC BLOOD PRESSURE: 70 MMHG | RESPIRATION RATE: 16 BRPM | HEIGHT: 63 IN | TEMPERATURE: 95.9 F

## 2021-04-08 DIAGNOSIS — E04.2 MULTINODULAR GOITER (NONTOXIC): ICD-10-CM

## 2021-04-08 DIAGNOSIS — E78.5 HYPERLIPIDEMIA WITH TARGET LDL LESS THAN 130: ICD-10-CM

## 2021-04-08 DIAGNOSIS — J45.990 EXERCISE-INDUCED ASTHMA: ICD-10-CM

## 2021-04-08 DIAGNOSIS — E55.9 VITAMIN D DEFICIENCY: ICD-10-CM

## 2021-04-08 DIAGNOSIS — M54.50 MIDLINE LOW BACK PAIN WITHOUT SCIATICA, UNSPECIFIED CHRONICITY: ICD-10-CM

## 2021-04-08 DIAGNOSIS — H61.23 BILATERAL IMPACTED CERUMEN: ICD-10-CM

## 2021-04-08 DIAGNOSIS — K21.9 GASTROESOPHAGEAL REFLUX DISEASE WITHOUT ESOPHAGITIS: ICD-10-CM

## 2021-04-08 DIAGNOSIS — R60.0 BILATERAL LEG EDEMA: ICD-10-CM

## 2021-04-08 DIAGNOSIS — Z00.00 ENCOUNTER FOR ANNUAL WELLNESS EXAM IN MEDICARE PATIENT: Primary | ICD-10-CM

## 2021-04-08 LAB
ALBUMIN UR-MCNC: NEGATIVE MG/DL
APPEARANCE UR: CLEAR
BACTERIA #/AREA URNS HPF: ABNORMAL /HPF
BASOPHILS # BLD AUTO: 0 10E9/L (ref 0–0.2)
BASOPHILS NFR BLD AUTO: 0.1 %
BILIRUB UR QL STRIP: NEGATIVE
COLOR UR AUTO: YELLOW
DIFFERENTIAL METHOD BLD: NORMAL
EOSINOPHIL # BLD AUTO: 0.2 10E9/L (ref 0–0.7)
EOSINOPHIL NFR BLD AUTO: 2.1 %
ERYTHROCYTE [DISTWIDTH] IN BLOOD BY AUTOMATED COUNT: 13.3 % (ref 10–15)
GLUCOSE UR STRIP-MCNC: NEGATIVE MG/DL
HCT VFR BLD AUTO: 40 % (ref 35–47)
HGB BLD-MCNC: 12.7 G/DL (ref 11.7–15.7)
HGB UR QL STRIP: NEGATIVE
KETONES UR STRIP-MCNC: NEGATIVE MG/DL
LEUKOCYTE ESTERASE UR QL STRIP: NEGATIVE
LYMPHOCYTES # BLD AUTO: 2.5 10E9/L (ref 0.8–5.3)
LYMPHOCYTES NFR BLD AUTO: 33.2 %
MCH RBC QN AUTO: 30.3 PG (ref 26.5–33)
MCHC RBC AUTO-ENTMCNC: 31.8 G/DL (ref 31.5–36.5)
MCV RBC AUTO: 96 FL (ref 78–100)
MONOCYTES # BLD AUTO: 0.5 10E9/L (ref 0–1.3)
MONOCYTES NFR BLD AUTO: 6.5 %
NEUTROPHILS # BLD AUTO: 4.4 10E9/L (ref 1.6–8.3)
NEUTROPHILS NFR BLD AUTO: 58.1 %
NITRATE UR QL: NEGATIVE
NON-SQ EPI CELLS #/AREA URNS LPF: ABNORMAL /LPF
PH UR STRIP: 5.5 PH (ref 5–7)
PLATELET # BLD AUTO: 245 10E9/L (ref 150–450)
RBC # BLD AUTO: 4.19 10E12/L (ref 3.8–5.2)
RBC #/AREA URNS AUTO: ABNORMAL /HPF
SOURCE: ABNORMAL
SP GR UR STRIP: 1.01 (ref 1–1.03)
UROBILINOGEN UR STRIP-ACNC: 0.2 EU/DL (ref 0.2–1)
WBC # BLD AUTO: 7.6 10E9/L (ref 4–11)
WBC #/AREA URNS AUTO: ABNORMAL /HPF

## 2021-04-08 PROCEDURE — 81001 URINALYSIS AUTO W/SCOPE: CPT | Performed by: INTERNAL MEDICINE

## 2021-04-08 PROCEDURE — 82306 VITAMIN D 25 HYDROXY: CPT | Performed by: INTERNAL MEDICINE

## 2021-04-08 PROCEDURE — 84443 ASSAY THYROID STIM HORMONE: CPT | Performed by: INTERNAL MEDICINE

## 2021-04-08 PROCEDURE — 80061 LIPID PANEL: CPT | Performed by: INTERNAL MEDICINE

## 2021-04-08 PROCEDURE — 80053 COMPREHEN METABOLIC PANEL: CPT | Performed by: INTERNAL MEDICINE

## 2021-04-08 PROCEDURE — 85025 COMPLETE CBC W/AUTO DIFF WBC: CPT | Performed by: INTERNAL MEDICINE

## 2021-04-08 PROCEDURE — 99213 OFFICE O/P EST LOW 20 MIN: CPT | Mod: 25 | Performed by: INTERNAL MEDICINE

## 2021-04-08 PROCEDURE — 99397 PER PM REEVAL EST PAT 65+ YR: CPT | Performed by: INTERNAL MEDICINE

## 2021-04-08 PROCEDURE — 36415 COLL VENOUS BLD VENIPUNCTURE: CPT | Performed by: INTERNAL MEDICINE

## 2021-04-08 PROCEDURE — 82607 VITAMIN B-12: CPT | Performed by: INTERNAL MEDICINE

## 2021-04-08 RX ORDER — FAMOTIDINE 40 MG/1
40 TABLET, FILM COATED ORAL DAILY
Qty: 90 TABLET | Refills: 1 | Status: SHIPPED | OUTPATIENT
Start: 2021-04-08 | End: 2022-08-09

## 2021-04-08 ASSESSMENT — ASTHMA QUESTIONNAIRES
QUESTION_4 LAST FOUR WEEKS HOW OFTEN HAVE YOU USED YOUR RESCUE INHALER OR NEBULIZER MEDICATION (SUCH AS ALBUTEROL): ONCE A WEEK OR LESS
QUESTION_5 LAST FOUR WEEKS HOW WOULD YOU RATE YOUR ASTHMA CONTROL: WELL CONTROLLED
QUESTION_1 LAST FOUR WEEKS HOW MUCH OF THE TIME DID YOUR ASTHMA KEEP YOU FROM GETTING AS MUCH DONE AT WORK, SCHOOL OR AT HOME: NONE OF THE TIME
ACT_TOTALSCORE: 23
QUESTION_2 LAST FOUR WEEKS HOW OFTEN HAVE YOU HAD SHORTNESS OF BREATH: NOT AT ALL
QUESTION_3 LAST FOUR WEEKS HOW OFTEN DID YOUR ASTHMA SYMPTOMS (WHEEZING, COUGHING, SHORTNESS OF BREATH, CHEST TIGHTNESS OR PAIN) WAKE YOU UP AT NIGHT OR EARLIER THAN USUAL IN THE MORNING: NOT AT ALL

## 2021-04-08 ASSESSMENT — MIFFLIN-ST. JEOR: SCORE: 1394.99

## 2021-04-08 ASSESSMENT — ACTIVITIES OF DAILY LIVING (ADL): CURRENT_FUNCTION: NO ASSISTANCE NEEDED

## 2021-04-08 NOTE — PATIENT INSTRUCTIONS
I will let you know your lab results.         Get some Debrox wax softening drops. Use them twice per day, and then schedule an ear wash.               Call for a physical therapy appointment.                  You are planning to do more walking this spring and summer.             I ordered some famotidine, 40 mg per day, to decrease acid reflux.  Take it before supper.                                       Restrict your sodium intake, elevate your legs.             I will order a mild diuretic when I get your lab results.                      Please note that I am planning to retire on December 31, 2021.              You are welcome to continue your care through this clinic.              For a variety of reasons I will not refer you to a specific provider.                  Another option of course is to switch to a clinic which is closer to your residence.

## 2021-04-08 NOTE — PROGRESS NOTES
"SUBJECTIVE:   Nanci Levy is a 65 year old female who presents for Preventive Visit.      Patient has been advised of split billing requirements and indicates understanding: Yes   Are you in the first 12 months of your Medicare coverage?  No    Healthy Habits:     In general, how would you rate your overall health?  Good    Frequency of exercise:  2-3 days/week    Duration of exercise:  15-30 minutes    Do you usually eat at least 4 servings of fruit and vegetables a day, include whole grains    & fiber and avoid regularly eating high fat or \"junk\" foods?  No    Taking medications regularly:  Yes    Medication side effects:  None    Ability to successfully perform activities of daily living:  No assistance needed    Home Safety:  No safety concerns identified    Hearing Impairment:  No hearing concerns    In the past 6 months, have you been bothered by leaking of urine?  No    In general, how would you rate your overall mental or emotional health?  Good      PHQ-2 Total Score: 0    Additional concerns today:  Yes    Do you feel safe in your environment? yes    Have you ever done Advance Care Planning? (For example, a Health Directive, POLST, or a discussion with a medical provider or your loved ones about your wishes): No, advance care planning information given to patient to review.  Patient plans to discuss their wishes with loved ones or provider.         Fall risk  Fallen 2 or more times in the past year?: No  Any fall with injury in the past year?: No    Cognitive Screening   1) Repeat 3 items (Leader, Season, Table)    2) Clock draw: NORMAL  3) 3 item recall: Recalls 3 objects  Results: 3 items recalled: COGNITIVE IMPAIRMENT LESS LIKELY    Mini-CogTM Copyright SIERRA Pederson. Licensed by the author for use in [facility]; reprinted with permission (vinicio@[email or web address]). All rights reserved.      Do you have sleep apnea, excessive snoring or daytime drowsiness?: no    Reviewed and updated as needed " "this visit by clinical staff  Tobacco  Allergies   Problems             Reviewed and updated as needed this visit by Provider     Problems            Social History     Tobacco Use     Smoking status: Never Smoker     Smokeless tobacco: Never Used   Substance Use Topics     Alcohol use: No     If you drink alcohol do you typically have >3 drinks per day or >7 drinks per week? No    Alcohol Use 2021   Prescreen: >3 drinks/day or >7 drinks/week? No   Prescreen: >3 drinks/day or >7 drinks/week? -            Mother  age 100;   .        She is grieving.               This patient  had 8 siblings.        She grew up in [place].                           She retired, but   works part time; she is a  for [employer].            S/p 2 COVID vaccines.                                               Was exercising before COVID.  She had lost a few pounds.        Plans to start up again.                     Multiple chronic complaints.            Throat \"dryness\".                            Some LBP; mid back, left and right of the midline.  No radicular symptoms .            Chronic leg edema.    She has larger legs.            Ongoing asthma ; occas albuterol MDI.          She uses albuterol before taking a walk.    Burping.      Heartburn symptoms.    Not using any pharmacologic treatment now.     Colonoscopy  .              10-year follow-up was advised at the time.                 Mammogram nml 2 days ago.                             Current providers sharing in care for this patient include:   Patient Care Team:  Thomas Cormier MD as PCP - General (Internal Medicine)  Magda Cyr MD as Assigned PCP    The following health maintenance items are reviewed in Epic and correct as of today:  Health Maintenance Due   Topic Date Due     DEXA  Never done     ASTHMA ACTION PLAN  Never done     ASTHMA CONTROL TEST  Never done     ZOSTER IMMUNIZATION (1 of 2) Never done     Pneumococcal Vaccine: " Pediatrics (0 to 5 Years) and At-Risk Patients (6 to 64 Years) (1 of 2 - PPSV23) 11/05/2018     Pneumococcal Vaccine: 65+ Years (1 of 2 - PPSV23) 11/05/2018     INFLUENZA VACCINE (1) Never done     Patient Active Problem List    Diagnosis Date Noted     Exercise-induced asthma 09/11/2020     Priority: Medium     Gastroesophageal reflux disease without esophagitis 01/27/2020     Priority: Medium     Wheezing 06/29/2019     Priority: Medium     spirometry  shows FEV1 and FVC are normal. The ratio is 72%,which is barely normal. FEF 25-75 equals 65% of predicted       Bilateral leg edema 06/29/2019     Priority: Medium     Persistent cough 06/29/2019     Priority: Medium     Chest x-ray negative in 6/19       Cervical cancer screening 09/18/2018     Priority: Medium     Pt age 63   Normal paps in  02/25/11: NIL pap  10/29/12: NIL pap  12/2014: NIL pap, + HPV (not 16 or 18). Plan cotest pap & HPV in 1 year.    12/28/15: NIL pap, neg HPV. Plan cotest pap & HPV in 3 years.  09/10/18: NIL pap, neg HR HPV result. Plan routine screening.   No history of MATTHEW 2 or greater found in epic.   No further cervical cancer screening recommended.   Hm updated.              Left facial numbness 02/06/2018     Priority: Medium     MRI brain neg       Paresthesias 02/06/2018     Priority: Medium     left side       Chronic pain of left knee 06/26/2017     Priority: Medium     tried PT x 12 months, no improvement and now daily constant pain.  Should see sports med/ortho again.  Referred.       Bursitis, knee, left 03/31/2016     Priority: Medium     X-ray indicates patellofemoral arthritis       Paresthesia of left foot 03/31/2016     Priority: Medium     Hyperlipidemia with target LDL less than 130 12/08/2014     Priority: Medium     Diagnosis updated by automated process. Provider to review and confirm.       Sciatica 11/25/2013     Priority: Medium     Left side; 1/13; also see ortho note 2006; had an L5-S1 osteophyte/disc complex with  "moderate to severe foraminal stenosis L        Chest wall pain      Priority: Medium     see cardiology note; MRI heart nml                 CXR 2/11 normal       Multinodular goiter (nontoxic)      Priority: Medium     bx non-Dx; watery colloid ; not enough cells.         Had a 1 cm nodule in each lobe; bx in 2009; \"in all likelihood benign\" ;    US in 2/14; nodules are smaller.       Another ultrasound in 1/16; nodules are slightly larger       Vitamin D deficiency 10/29/2012     Priority: Medium     17 in 11/13; resume vit D  Problem list name updated by automated process. Provider to review       Family history of diabetes mellitus 10/29/2012     Priority: Medium     Arthralgia of temporomandibular joint 10/29/2012     Priority: Medium     left       Anemia 10/29/2012     Priority: Medium     12.7 with ferritin 41 in 10/12; add Fe with each blood donation;                           12.1 and 77 in 11/13, 12.1 and 56 in 12/14  Problem list name updated by automated process. Provider to review       Hearing loss 10/29/2012     Priority: Medium     left  Problem list name updated by automated process. Provider to review       ACP (advance care planning) 09/02/2015     Priority: Low     Advance Care Planning 9/2/2015: ACP Review and Resources Provided:  Reviewed chart for advance care plan.  Nanci Novabrough has no plan or code status on file. Discussed available resources and provided with information. . Added by Nava Wilson               Preventive measure 10/30/2012     Priority: Low     APRIMA DATA BASE UNDER THE 1/21/13 NOTE  Colonoscopy 7/03, 2/14, neg, recheck in 10 yrs;                           Mammogram 12/13;2/15 ,6/17, 11/18 , 2/20                        Pap 10/12;12/14 , 12/15                              plan DXA age 63       Past Surgical History:   Procedure Laterality Date     HYSTERECTOMY  08/1997    supra cervical abdominal hysterectomy; ovaries remain     Social History     Socioeconomic " History     Marital status: Single     Spouse name: Not on file     Number of children: Not on file     Years of education: Not on file     Highest education level: Not on file   Occupational History     Not on file   Social Needs     Financial resource strain: Not on file     Food insecurity     Worry: Not on file     Inability: Not on file     Transportation needs     Medical: Not on file     Non-medical: Not on file   Tobacco Use     Smoking status: Never Smoker     Smokeless tobacco: Never Used   Substance and Sexual Activity     Alcohol use: No     Drug use: No     Sexual activity: Yes     Partners: Male   Lifestyle     Physical activity     Days per week: Not on file     Minutes per session: Not on file     Stress: Not on file   Relationships     Social connections     Talks on phone: Not on file     Gets together: Not on file     Attends Zoroastrianism service: Not on file     Active member of club or organization: Not on file     Attends meetings of clubs or organizations: Not on file     Relationship status: Not on file     Intimate partner violence     Fear of current or ex partner: Not on file     Emotionally abused: Not on file     Physically abused: Not on file     Forced sexual activity: Not on file   Other Topics Concern     Parent/sibling w/ CABG, MI or angioplasty before 65F 55M? Yes   Social History Narrative    Her mother   at age 89     Immunization History   Administered Date(s) Administered     COVID-19,PF,Moderna 2021, 2021     Pneumo Conj 13-V (2010&after) 09/10/2018     TD (ADULT, 7+) 2003     TDAP Vaccine (Adacel) 2013       BP Readings from Last 3 Encounters:   21 120/70   20 (P) 136/78   20 122/70    Wt Readings from Last 3 Encounters:   21 89.1 kg (196 lb 6.4 oz)   20 90.7 kg (200 lb)   20 90.3 kg (199 lb)                  Current Outpatient Medications   Medication Sig Dispense Refill     albuterol (PROAIR HFA/PROVENTIL  "HFA/VENTOLIN HFA) 108 (90 Base) MCG/ACT inhaler Inhale 2 puffs into the lungs 2 times daily And Q 6 hrs prn 8.5 g 11     aspirin 81 MG tablet Take 1 tablet by mouth daily       Cholecalciferol (VITAMIN D) 2000 UNITS tablet Take 2,000 Units by mouth daily 100 tablet 11     estradiol (ESTRACE) 0.1 MG/GM vaginal cream Place 2 g vaginally twice a week 42.5 g 0     No Known Allergies    Any new diagnosis of family breast, ovarian, or bowel cancer? No    FSH-7: No flowsheet data found.      Pertinent mammograms are reviewed under the imaging tab.    Review of Systems      OBJECTIVE:   /70 (BP Location: Right arm, Patient Position: Chair, Cuff Size: Adult Large)   Pulse 84   Temp 95.9  F (35.5  C) (Temporal)   Resp 16   Ht 1.6 m (5' 3\")   Wt 89.1 kg (196 lb 6.4 oz)   LMP 10/29/1998   SpO2 97%   BMI 34.79 kg/m   Estimated body mass index is 34.79 kg/m  as calculated from the following:    Height as of this encounter: 1.6 m (5' 3\").    Weight as of this encounter: 89.1 kg (196 lb 6.4 oz).  Physical Exam  GENERAL APPEARANCE: alert, no distress and over weight  EYES: Eyes grossly normal to inspection, PERRL and conjunctivae and sclerae normal  HENT: Bilateral cerumen impaction  NECK: no adenopathy, no asymmetry, masses, or scars and tiny thyroid nodules as before  RESP: lungs clear to auscultation - no rales, rhonchi or wheezes  BREAST: normal without masses, tenderness or nipple discharge and no palpable axillary masses or adenopathy  CV: regular rates and rhythm, normal S1 S2, no S3 or S4, no murmur, click or rub and pitting B/L LE edema to 1+  ABDOMEN: soft, nontender, without hepatosplenomegaly or masses  NEURO: Normal strength and tone, mentation intact and speech normal    Diagnostic Test Results:  Labs are pending    ASSESSMENT / PLAN:   Nanci was seen today for medicare visit.    Diagnoses and all orders for this visit:    Encounter for annual wellness exam in Medicare patient    Multinodular goiter " (nontoxic)  -     TSH with free T4 reflex    Gastroesophageal reflux disease without esophagitis  -     Vitamin B12  -     CBC with platelets and differential  -     famotidine (PEPCID) 40 MG tablet; Take 1 tablet (40 mg) by mouth daily    Bilateral leg edema  -     Comprehensive metabolic panel (BMP + Alb, Alk Phos, ALT, AST, Total. Bili, TP)  -     UA with Microscopic reflex to Culture    Vitamin D deficiency  -     Vitamin D Deficiency    Exercise-induced asthma    Hyperlipidemia with target LDL less than 130  -     Comprehensive metabolic panel (BMP + Alb, Alk Phos, ALT, AST, Total. Bili, TP)  -     Lipid Profile (Chol, Trig, HDL, LDL calc)    Midline low back pain without sciatica, unspecified chronicity  -     [facility] PT AND HAND REFERRAL; Future    Bilateral impacted cerumen         Summary and implications:  We reviewed multiple issues.           We reviewed all of the issues on the diagnoses list.              Patient Instructions                   I will let you know your lab results.         Get some Debrox wax softening drops. Use them twice per day, and then schedule an ear wash.               Call for a physical therapy appointment.                  You are planning to do more walking this spring and summer.             I ordered some famotidine, 40 mg per day, to decrease acid reflux.  Take it before supper.                                       Restrict your sodium intake, elevate your legs.             I will order a mild diuretic when I get your lab results.                      Please note that I am planning to retire on December 31, 2021.              You are welcome to continue your care through this clinic.              For a variety of reasons I will not refer you to a specific provider.                  Another option of course is to switch to a clinic which is closer to your residence.          Return for Ear wash.        Patient has been advised of split billing requirements and indicates  "understanding: Yes  COUNSELING:  Reviewed preventive health counseling, as reflected in patient instructions       Regular exercise       Healthy diet/nutrition    Estimated body mass index is 34.79 kg/m  as calculated from the following:    Height as of this encounter: 1.6 m (5' 3\").    Weight as of this encounter: 89.1 kg (196 lb 6.4 oz).    Weight management plan: Discussed healthy diet and exercise guidelines    She reports that she has never smoked. She has never used smokeless tobacco.      Appropriate preventive services were discussed with this patient, including applicable screening as appropriate for cardiovascular disease, diabetes, osteopenia/osteoporosis, and glaucoma.  As appropriate for age/gender, discussed screening for colorectal cancer, prostate cancer, breast cancer, and cervical cancer. Checklist reviewing preventive services available has been given to the patient.    Reviewed patients plan of care and provided an AVS. The Basic Care Plan (routine screening as documented in Health Maintenance) for Nanci meets the Care Plan requirement. This Care Plan has been established and reviewed with the Patient.    Counseling Resources:  ATP IV Guidelines  Pooled Cohorts Equation Calculator  Breast Cancer Risk Calculator  Breast Cancer: Medication to Reduce Risk  FRAX Risk Assessment  ICSI Preventive Guidelines  Dietary Guidelines for Americans, 2010  [employer]'s MyPlate  ASA Prophylaxis  Lung CA Screening    Thomas Cormier MD  [facility]    Results for orders placed or performed in visit on 04/08/21   Comprehensive metabolic panel (BMP + Alb, Alk Phos, ALT, AST, Total. Bili, TP)     Status: Abnormal   Result Value Ref Range    Sodium 137 133 - 144 mmol/L    Potassium 3.7 3.4 - 5.3 mmol/L    Chloride 103 94 - 109 mmol/L    Carbon Dioxide 32 20 - 32 mmol/L    Anion Gap 2 (L) 3 - 14 mmol/L    Glucose 91 70 - 99 mg/dL    Urea Nitrogen 15 7 - 30 mg/dL    Creatinine 0.82 0.52 - " 1.04 mg/dL    GFR Estimate 73 >60 mL/min/[1.73_m2]    GFR Estimate If Black 85 >60 mL/min/[1.73_m2]    Calcium 9.6 8.5 - 10.1 mg/dL    Bilirubin Total 0.3 0.2 - 1.3 mg/dL    Albumin 3.8 3.4 - 5.0 g/dL    Protein Total 8.0 6.8 - 8.8 g/dL    Alkaline Phosphatase 142 40 - 150 U/L    ALT 22 0 - 50 U/L    AST 16 0 - 45 U/L   Lipid Profile (Chol, Trig, HDL, LDL calc)     Status: Abnormal   Result Value Ref Range    Cholesterol 217 (H) <200 mg/dL    Triglycerides 124 <150 mg/dL    HDL Cholesterol 51 >49 mg/dL    LDL Cholesterol Calculated 141 (H) <100 mg/dL    Non HDL Cholesterol 166 (H) <130 mg/dL   Vitamin B12     Status: None   Result Value Ref Range    Vitamin B12 389 193 - 986 pg/mL   Vitamin D Deficiency     Status: Abnormal   Result Value Ref Range    Vitamin D Deficiency screening 18 (L) 20 - 75 ug/L   TSH with free T4 reflex     Status: None   Result Value Ref Range    TSH 1.59 0.40 - 4.00 mU/L   CBC with platelets and differential     Status: None   Result Value Ref Range    WBC 7.6 4.0 - 11.0 10e9/L    RBC Count 4.19 3.8 - 5.2 10e12/L    Hemoglobin 12.7 11.7 - 15.7 g/dL    Hematocrit 40.0 35.0 - 47.0 %    MCV 96 78 - 100 fl    MCH 30.3 26.5 - 33.0 pg    MCHC 31.8 31.5 - 36.5 g/dL    RDW 13.3 10.0 - 15.0 %    Platelet Count 245 150 - 450 10e9/L    % Neutrophils 58.1 %    % Lymphocytes 33.2 %    % Monocytes 6.5 %    % Eosinophils 2.1 %    % Basophils 0.1 %    Absolute Neutrophil 4.4 1.6 - 8.3 10e9/L    Absolute Lymphocytes 2.5 0.8 - 5.3 10e9/L    Absolute Monocytes 0.5 0.0 - 1.3 10e9/L    Absolute Eosinophils 0.2 0.0 - 0.7 10e9/L    Absolute Basophils 0.0 0.0 - 0.2 10e9/L    Diff Method Automated Method    UA with Microscopic reflex to Culture     Status: Abnormal    Specimen: Midstream Urine   Result Value Ref Range    Color Urine Yellow     Appearance Urine Clear     Glucose Urine Negative NEG^Negative mg/dL    Bilirubin Urine Negative NEG^Negative    Ketones Urine Negative NEG^Negative mg/dL    Specific Gravity  Urine 1.010 1.003 - 1.035    pH Urine 5.5 5.0 - 7.0 pH    Protein Albumin Urine Negative NEG^Negative mg/dL    Urobilinogen Urine 0.2 0.2 - 1.0 EU/dL    Nitrite Urine Negative NEG^Negative    Blood Urine Negative NEG^Negative    Leukocyte Esterase Urine Negative NEG^Negative    Source Midstream Urine     WBC Urine 0 - 5 OTO5^0 - 5 /HPF    RBC Urine O - 2 OTO2^O - 2 /HPF    Squamous Epithelial /LPF Urine Few FEW^Few /LPF    Bacteria Urine Few (A) NEG^Negative /HPF     Letter sent.                  The vitamin D level is a bit low. Please add another 2,000 IU of vitamin D per day; you do not need an Rx for this.       Your other lab results are normal or stable,including the liver,kidney,glucose, thyroid, cholesterol, and the bone marrow function.

## 2021-04-08 NOTE — LETTER
My Asthma Action Plan    Name: Nanci Levy   YOB: 1955  Date: 4/8/2021   My doctor: Thomas Cormier MD   My clinic: [facility]        My Rescue Medicine:   Albuterol inhaler (Proair/Ventolin/Proventil HFA)  2-4 puffs EVERY 4 HOURS as needed. Use a spacer if recommended by your provider.   My Asthma Severity:   Intermittent / Exercise Induced  Know your asthma triggers: dust mites, pollens and exercise or sports             GREEN ZONE   Good Control    I feel good    No cough or wheeze    Can work, sleep and play without asthma symptoms       Take your asthma control medicine every day.     1. If exercise triggers your asthma, take your rescue medication    15 minutes before exercise or sports, and    During exercise if you have asthma symptoms  2. Spacer to use with inhaler: If you have a spacer, make sure to use it with your inhaler             YELLOW ZONE Getting Worse  I have ANY of these:    I do not feel good    Cough or wheeze    Chest feels tight    Wake up at night   1. Keep taking your Green Zone medications  2. Start taking your rescue medicine:    every 20 minutes for up to 1 hour. Then every 4 hours for 24-48 hours.  3. If you stay in the Yellow Zone for more than 12-24 hours, contact your doctor.  4. If you do not return to the Green Zone in 12-24 hours or you get worse, start taking your oral steroid medicine if prescribed by your provider.           RED ZONE Medical Alert - Get Help  I have ANY of these:    I feel awful    Medicine is not helping    Breathing getting harder    Trouble walking or talking    Nose opens wide to breathe       1. Take your rescue medicine NOW  2. If your provider has prescribed an oral steroid medicine, start taking it NOW  3. Call your doctor NOW  4. If you are still in the Red Zone after 20 minutes and you have not reached your doctor:    Take your rescue medicine again and    Call 911 or go to the emergency  room right away    See your regular doctor within 2 weeks of an Emergency Room or Urgent Care visit for follow-up treatment.          Annual Reminders:  Meet with Asthma Educator,  Flu Shot in the Fall, consider Pneumonia Vaccination for patients with asthma (aged 19 and older).    Pharmacy:    [id number] - [facility] [address] AT [address] [place] [address]  [id number] - [place], [place] - [address] [facility]  [id number] [place], [place] - [address]    Electronically signed by Thomas Cormier MD   Date: 04/08/21                    Asthma Triggers  How To Control Things That Make Your Asthma Worse    Triggers are things that make your asthma worse.  Look at the list below to help you find your triggers and   what you can do about them. You can help prevent asthma flare-ups by staying away from your triggers.      Trigger                                                          What you can do   Cigarette Smoke  Tobacco smoke can make asthma worse. Do not allow smoking in your home, car or around you.  Be sure no one smokes at a child s day care or school.  If you smoke, ask your health care provider for ways to help you quit.  Ask family members to quit too.  Ask your health care provider for a referral to Quit Plan to help you quit smoking, or call 4-436-439-PLAN.     Colds, Flu, Bronchitis  These are common triggers of asthma. Wash your hands often.  Don t touch your eyes, nose or mouth.  Get a flu shot every year.     Dust Mites  These are tiny bugs that live in cloth or carpet. They are too small to see. Wash sheets and blankets in hot water every week.   Encase pillows and mattress in dust mite proof covers.  Avoid having carpet if you can. If you have carpet, vacuum weekly.   Use a dust mask and HEPA vacuum.   Pollen and Outdoor Mold  Some people are allergic to trees, grass, or weed pollen, or molds. Try to keep your windows closed.  Limit time out  doors when pollen count is high.   Ask you health care provider about taking medicine during allergy season.     Animal Dander  Some people are allergic to skin flakes, urine or saliva from pets with fur or feathers. Keep pets with fur or feathers out of your home.    If you can t keep the pet outdoors, then keep the pet out of your bedroom.  Keep the bedroom door closed.  Keep pets off cloth furniture and away from stuffed toys.     Mice, Rats, and Cockroaches  Some people are allergic to the waste from these pests.   Cover food and garbage.  Clean up spills and food crumbs.  Store grease in the refrigerator.   Keep food out of the bedroom.   Indoor Mold  This can be a trigger if your home has high moisture. Fix leaking faucets, pipes, or other sources of water.   Clean moldy surfaces.  Dehumidify basement if it is damp and smelly.   Smoke, Strong Odors, and Sprays  These can reduce air quality. Stay away from strong odors and sprays, such as perfume, powder, hair spray, paints, smoke incense, paint, cleaning products, candles and new carpet.   Exercise or Sports  Some people with asthma have this trigger. Be active!  Ask your doctor about taking medicine before sports or exercise to prevent symptoms.    Warm up for 5-10 minutes before and after sports or exercise.     Other Triggers of Asthma  Cold air:  Cover your nose and mouth with a scarf.  Sometimes laughing or crying can be a trigger.  Some medicines and food can trigger asthma.

## 2021-04-08 NOTE — LETTER
April 9, 2021      Nanci Levy  [address]  [facility] [id number]        Dear ,    We are writing to inform you of your test results.              The vitamin D level is a bit low. Please add another 2,000 IU of vitamin D per day; you do not need an Rx for this.       Your other lab results are normal or stable,including the liver,kidney,glucose, thyroid, cholesterol, and the bone marrow function.      Results for orders placed or performed in visit on 04/08/21   Comprehensive metabolic panel (BMP + Alb, Alk Phos, ALT, AST, Total. Bili, TP)     Status: Abnormal   Result Value Ref Range    Sodium 137 133 - 144 mmol/L    Potassium 3.7 3.4 - 5.3 mmol/L    Chloride 103 94 - 109 mmol/L    Carbon Dioxide 32 20 - 32 mmol/L    Anion Gap 2 (L) 3 - 14 mmol/L    Glucose 91 70 - 99 mg/dL    Urea Nitrogen 15 7 - 30 mg/dL    Creatinine 0.82 0.52 - 1.04 mg/dL    GFR Estimate 73 >60 mL/min/[1.73_m2]    GFR Estimate If Black 85 >60 mL/min/[1.73_m2]    Calcium 9.6 8.5 - 10.1 mg/dL    Bilirubin Total 0.3 0.2 - 1.3 mg/dL    Albumin 3.8 3.4 - 5.0 g/dL    Protein Total 8.0 6.8 - 8.8 g/dL    Alkaline Phosphatase 142 40 - 150 U/L    ALT 22 0 - 50 U/L    AST 16 0 - 45 U/L   Lipid Profile (Chol, Trig, HDL, LDL calc)     Status: Abnormal   Result Value Ref Range    Cholesterol 217 (H) <200 mg/dL    Triglycerides 124 <150 mg/dL    HDL Cholesterol 51 >49 mg/dL    LDL Cholesterol Calculated 141 (H) <100 mg/dL    Non HDL Cholesterol 166 (H) <130 mg/dL   Vitamin B12     Status: None   Result Value Ref Range    Vitamin B12 389 193 - 986 pg/mL   Vitamin D Deficiency     Status: Abnormal   Result Value Ref Range    Vitamin D Deficiency screening 18 (L) 20 - 75 ug/L   TSH with free T4 reflex     Status: None   Result Value Ref Range    TSH 1.59 0.40 - 4.00 mU/L   CBC with platelets and differential     Status: None   Result Value Ref Range    WBC 7.6 4.0 - 11.0 10e9/L    RBC Count 4.19 3.8 - 5.2 10e12/L    Hemoglobin  12.7 11.7 - 15.7 g/dL    Hematocrit 40.0 35.0 - 47.0 %    MCV 96 78 - 100 fl    MCH 30.3 26.5 - 33.0 pg    MCHC 31.8 31.5 - 36.5 g/dL    RDW 13.3 10.0 - 15.0 %    Platelet Count 245 150 - 450 10e9/L    % Neutrophils 58.1 %    % Lymphocytes 33.2 %    % Monocytes 6.5 %    % Eosinophils 2.1 %    % Basophils 0.1 %    Absolute Neutrophil 4.4 1.6 - 8.3 10e9/L    Absolute Lymphocytes 2.5 0.8 - 5.3 10e9/L    Absolute Monocytes 0.5 0.0 - 1.3 10e9/L    Absolute Eosinophils 0.2 0.0 - 0.7 10e9/L    Absolute Basophils 0.0 0.0 - 0.2 10e9/L    Diff Method Automated Method    UA with Microscopic reflex to Culture     Status: Abnormal    Specimen: Midstream Urine   Result Value Ref Range    Color Urine Yellow     Appearance Urine Clear     Glucose Urine Negative NEG^Negative mg/dL    Bilirubin Urine Negative NEG^Negative    Ketones Urine Negative NEG^Negative mg/dL    Specific Gravity Urine 1.010 1.003 - 1.035    pH Urine 5.5 5.0 - 7.0 pH    Protein Albumin Urine Negative NEG^Negative mg/dL    Urobilinogen Urine 0.2 0.2 - 1.0 EU/dL    Nitrite Urine Negative NEG^Negative    Blood Urine Negative NEG^Negative    Leukocyte Esterase Urine Negative NEG^Negative    Source Midstream Urine     WBC Urine 0 - 5 OTO5^0 - 5 /HPF    RBC Urine O - 2 OTO2^O - 2 /HPF    Squamous Epithelial /LPF Urine Few FEW^Few /LPF    Bacteria Urine Few (A) NEG^Negative /HPF         If you have any questions or concerns, please call the clinic at the number listed above.       Sincerely,        Thomas Cormier MD

## 2021-04-09 LAB
ALBUMIN SERPL-MCNC: 3.8 G/DL (ref 3.4–5)
ALP SERPL-CCNC: 142 U/L (ref 40–150)
ALT SERPL W P-5'-P-CCNC: 22 U/L (ref 0–50)
ANION GAP SERPL CALCULATED.3IONS-SCNC: 2 MMOL/L (ref 3–14)
AST SERPL W P-5'-P-CCNC: 16 U/L (ref 0–45)
BILIRUB SERPL-MCNC: 0.3 MG/DL (ref 0.2–1.3)
BUN SERPL-MCNC: 15 MG/DL (ref 7–30)
CALCIUM SERPL-MCNC: 9.6 MG/DL (ref 8.5–10.1)
CHLORIDE SERPL-SCNC: 103 MMOL/L (ref 94–109)
CHOLEST SERPL-MCNC: 217 MG/DL
CO2 SERPL-SCNC: 32 MMOL/L (ref 20–32)
CREAT SERPL-MCNC: 0.82 MG/DL (ref 0.52–1.04)
DEPRECATED CALCIDIOL+CALCIFEROL SERPL-MC: 18 UG/L (ref 20–75)
GFR SERPL CREATININE-BSD FRML MDRD: 73 ML/MIN/{1.73_M2}
GLUCOSE SERPL-MCNC: 91 MG/DL (ref 70–99)
HDLC SERPL-MCNC: 51 MG/DL
LDLC SERPL CALC-MCNC: 141 MG/DL
NONHDLC SERPL-MCNC: 166 MG/DL
POTASSIUM SERPL-SCNC: 3.7 MMOL/L (ref 3.4–5.3)
PROT SERPL-MCNC: 8 G/DL (ref 6.8–8.8)
SODIUM SERPL-SCNC: 137 MMOL/L (ref 133–144)
TRIGL SERPL-MCNC: 124 MG/DL
TSH SERPL DL<=0.005 MIU/L-ACNC: 1.59 MU/L (ref 0.4–4)
VIT B12 SERPL-MCNC: 389 PG/ML (ref 193–986)

## 2021-04-09 ASSESSMENT — ASTHMA QUESTIONNAIRES: ACT_TOTALSCORE: 23

## 2021-06-08 ENCOUNTER — OFFICE VISIT (OUTPATIENT)
Dept: URGENT CARE | Facility: URGENT CARE | Age: 66
End: 2021-06-08
Payer: COMMERCIAL

## 2021-06-08 ENCOUNTER — NURSE TRIAGE (OUTPATIENT)
Dept: INTERNAL MEDICINE | Facility: CLINIC | Age: 66
End: 2021-06-08

## 2021-06-08 VITALS
BODY MASS INDEX: 35.36 KG/M2 | TEMPERATURE: 98.2 F | WEIGHT: 199.6 LBS | SYSTOLIC BLOOD PRESSURE: 132 MMHG | DIASTOLIC BLOOD PRESSURE: 77 MMHG | HEART RATE: 81 BPM

## 2021-06-08 DIAGNOSIS — R60.9 EDEMA, UNSPECIFIED TYPE: Primary | ICD-10-CM

## 2021-06-08 DIAGNOSIS — R06.9 UNSPECIFIED ABNORMALITIES OF BREATHING: ICD-10-CM

## 2021-06-08 LAB
ANION GAP SERPL CALCULATED.3IONS-SCNC: <1 MMOL/L (ref 3–14)
BUN SERPL-MCNC: 12 MG/DL (ref 7–30)
CALCIUM SERPL-MCNC: 9.3 MG/DL (ref 8.5–10.1)
CHLORIDE SERPL-SCNC: 107 MMOL/L (ref 94–109)
CO2 SERPL-SCNC: 32 MMOL/L (ref 20–32)
CREAT SERPL-MCNC: 0.88 MG/DL (ref 0.52–1.04)
GFR SERPL CREATININE-BSD FRML MDRD: 67 ML/MIN/{1.73_M2}
GLUCOSE SERPL-MCNC: 101 MG/DL (ref 70–99)
POTASSIUM SERPL-SCNC: 4 MMOL/L (ref 3.4–5.3)
SODIUM SERPL-SCNC: 139 MMOL/L (ref 133–144)

## 2021-06-08 PROCEDURE — 36415 COLL VENOUS BLD VENIPUNCTURE: CPT | Performed by: FAMILY MEDICINE

## 2021-06-08 PROCEDURE — 99214 OFFICE O/P EST MOD 30 MIN: CPT | Performed by: FAMILY MEDICINE

## 2021-06-08 PROCEDURE — 83880 ASSAY OF NATRIURETIC PEPTIDE: CPT | Performed by: FAMILY MEDICINE

## 2021-06-08 PROCEDURE — 80048 BASIC METABOLIC PNL TOTAL CA: CPT | Performed by: FAMILY MEDICINE

## 2021-06-08 RX ORDER — FUROSEMIDE 20 MG
20 TABLET ORAL DAILY
Qty: 15 TABLET | Refills: 0 | Status: SHIPPED | OUTPATIENT
Start: 2021-06-08 | End: 2021-08-10

## 2021-06-08 NOTE — TELEPHONE ENCOUNTER
Pt calling saying she has swollen ankles and is prone to water retention. They have been swollen off and on for last 3 days been growing in size.   Please advise.   Stephanie Majano

## 2021-06-08 NOTE — TELEPHONE ENCOUNTER
"Patient reports swelling in both legs. Left leg is a little bigger than the right.   Advised UC for new leg swelling.    Reason for Disposition    Thigh, calf, or ankle swelling in both legs, but one side is definitely more swollen (Exception: longstanding difference between legs)    Additional Information    Negative: Sounds like a life-threatening emergency to the triager    Negative: Chest pain    Negative: Small area of swelling and followed an insect bite to the area    Negative: Followed a knee injury    Negative: Ankle or foot injury    Negative: Pregnant with leg swelling or edema    Negative: Difficulty breathing at rest    Negative: Entire foot is cool or blue in comparison to other side    Negative: SEVERE swelling (e.g., swelling extends above knee, entire leg is swollen, weeping fluid)    Negative: Thigh or calf pain and only 1 side and present > 1 hour    Negative: Thigh, calf, or ankle swelling in only one leg    Answer Assessment - Initial Assessment Questions  1. ONSET: \"When did the swelling start?\" (e.g., minutes, hours, days)      Started this weekend. Off and on swell and go down. Today, back up big.   2. LOCATION: \"What part of the leg is swollen?\"  \"Are both legs swollen or just one leg?\"      Both, one a little bigger than the other.   3. SEVERITY: \"How bad is the swelling?\" (e.g., localized; mild, moderate, severe)   - Localized - small area of swelling localized to one leg   - MILD pedal edema - swelling limited to foot and ankle, pitting edema < 1/4 inch (6 mm) deep, rest and elevation eliminate most or all swelling   - MODERATE edema - swelling of lower leg to knee, pitting edema > 1/4 inch (6 mm) deep, rest and elevation only partially reduce swelling   - SEVERE edema - swelling extends above knee, facial or hand swelling present       Almost to knee, maybe the left side of face is bigger than the other side.  4. REDNESS: \"Does the swelling look red or infected?\"      no  5. PAIN: \"Is " "the swelling painful to touch?\" If so, ask: \"How painful is it?\"   (Scale 1-10; mild, moderate or severe)      No pain  6. FEVER: \"Do you have a fever?\" If so, ask: \"What is it, how was it measured, and when did it start?\"       no  7. CAUSE: \"What do you think is causing the leg swelling?\"      No sure.   8. MEDICAL HISTORY: \"Do you have a history of heart failure, kidney disease, liver failure, or cancer?\"      no  9. RECURRENT SYMPTOM: \"Have you had leg swelling before?\" If so, ask: \"When was the last time?\" \"What happened that time?\"      Had swelling in legs before last appointment. More swollen now. Last appt was 2 months ago  10. OTHER SYMPTOMS: \"Do you have any other symptoms?\" (e.g., chest pain, difficulty breathing)        No chest pain. Don't have a lot of energy. A little headache lately.  11. PREGNANCY: \"Is there any chance you are pregnant?\" \"When was your last menstrual period?\"        NA    Protocols used: LEG SWELLING AND EDEMA-A-OH  Julia Palafox RN    "

## 2021-06-09 ENCOUNTER — TELEPHONE (OUTPATIENT)
Dept: INTERNAL MEDICINE | Facility: CLINIC | Age: 66
End: 2021-06-09

## 2021-06-09 LAB — NT-PROBNP SERPL-MCNC: 44 PG/ML (ref 0–125)

## 2021-06-09 NOTE — PROGRESS NOTES
Patient Instructions       Nanci   your electrolytes are normal.  The BNP blood test level is pending.  Most likely this can back tomorrow and screens for heart failure.  I would like you to start Lasix for your leg swelling 20 mg daily over the next few days.  You were given 15 tablets  Please recheck with your primary within 1 week to see how this medicine is helping and recheck on the symptoms you were seen for an urgent care.    Component      Latest Ref Rng & Units 6/8/2021   Sodium      133 - 144 mmol/L 139   Potassium      3.4 - 5.3 mmol/L 4.0   Chloride      94 - 109 mmol/L 107   Carbon Dioxide      20 - 32 mmol/L 32   Anion Gap      3 - 14 mmol/L <1 (L)   Glucose      70 - 99 mg/dL 101 (H)   Urea Nitrogen      7 - 30 mg/dL 12   Creatinine      0.52 - 1.04 mg/dL 0.88   GFR Estimate      >60 mL/min/1.73:m2 67   GFR Estimate If Black      >60 mL/min/1.73:m2 78      When speaking with patient in person, I instructed her to try the Lasix 20 mg daily as needed and to take it in the morning.  Patient discharged by Tangela Vargas MD under instruction/handoff by Dr. Duong

## 2021-06-09 NOTE — TELEPHONE ENCOUNTER
Reason for Call:  Other call back    Detailed comments: pt was seen in UC yesterday 6/8/21 for swollen ankle, pt was given lasix and pt wants to talk to dr oliveira first before she takes the medication. Pt said that she thinks she had taken the medication in the past and it made her sleepy. Pt also wants to know the lab results of her blood work from yesterdays visit in Urgent care. Please call pt back. Thanks.    Phone Number Patient can be reached at: Cell number on file:    Telephone Information:   Mobile [phone]       Best Time: anytime    Can we leave a detailed message on this number? YES    Call taken on 6/9/2021 at 12:28 PM by CHERIE RAMSEY

## 2021-06-09 NOTE — TELEPHONE ENCOUNTER
PCP see note in re: to taking Lasix prescribed in UC . Pt wanted to check with you .Kyra Ross RN

## 2021-06-09 NOTE — PATIENT INSTRUCTIONS
Nanci   your electrolytes are normal.  The BNP blood test level is pending.  Most likely this can back tomorrow and screens for heart failure.  I would like you to start Lasix for your leg swelling 20 mg daily over the next few days.  You were given 15 tablets  Please recheck with your primary within 1 week to see how this medicine is helping and recheck on the symptoms you were seen for an urgent care.    Component      Latest Ref Rng & Units 6/8/2021   Sodium      133 - 144 mmol/L 139   Potassium      3.4 - 5.3 mmol/L 4.0   Chloride      94 - 109 mmol/L 107   Carbon Dioxide      20 - 32 mmol/L 32   Anion Gap      3 - 14 mmol/L <1 (L)   Glucose      70 - 99 mg/dL 101 (H)   Urea Nitrogen      7 - 30 mg/dL 12   Creatinine      0.52 - 1.04 mg/dL 0.88   GFR Estimate      >60 mL/min/1.73:m2 67   GFR Estimate If Black      >60 mL/min/1.73:m2 78

## 2021-06-27 NOTE — PROGRESS NOTES
SUBJECTIVE: Nanci Levy is a 66 year old female presenting with a chief complaint of swollen legs.  Onset of symptoms was day(s) ago.  Course of illness is worsening.    Severity moderate  Current and Associated symptoms: occasional sob  Treatment measures tried include None tried.  Predisposing factors include HX of leg edema.    Past Medical History:   Diagnosis Date     Arthritis      Cervical high risk HPV (human papillomavirus) test positive 12/2014    nil pap, + HPV (not 16 or 18)     Chest wall pain 2/10    see cardiology note; MRI heart nml     Exercise-induced asthma 9/11/2020     Multinodular goiter (nontoxic)     bx non-Dx; watery colloid ; not enough cells     No Known Allergies  Social History     Tobacco Use     Smoking status: Never Smoker     Smokeless tobacco: Never Used   Substance Use Topics     Alcohol use: No       ROS:  SKIN: no rash  GI: no vomiting    OBJECTIVE:  /77   Pulse 81   Temp 98.2  F (36.8  C) (Tympanic)   Wt 90.5 kg (199 lb 9.6 oz)   LMP 10/29/1998   BMI 35.36 kg/m  GENERAL APPEARANCE: healthy, alert and no distress  RESP: lungs clear to auscultation - no rales, rhonchi or wheezes  CV: regular rates and rhythm, normal S1 S2, no murmur noted  SKIN: no suspicious lesions or rashes  bilateral leg edema      ICD-10-CM    1. Edema, unspecified type  R60.9 BNP-N terminal pro     Basic metabolic panel     furosemide (LASIX) 20 MG tablet   2. Unspecified abnormalities of breathing   R06.9 BNP-N terminal pro     Basic metabolic panel     Low salt diet, elevate legs  Fluids/Rest, f/u if worse/not any better

## 2021-08-10 ENCOUNTER — OFFICE VISIT (OUTPATIENT)
Dept: INTERNAL MEDICINE | Facility: CLINIC | Age: 66
End: 2021-08-10
Payer: COMMERCIAL

## 2021-08-10 VITALS
OXYGEN SATURATION: 98 % | BODY MASS INDEX: 34.84 KG/M2 | SYSTOLIC BLOOD PRESSURE: 128 MMHG | RESPIRATION RATE: 16 BRPM | DIASTOLIC BLOOD PRESSURE: 76 MMHG | WEIGHT: 196.7 LBS | HEART RATE: 78 BPM

## 2021-08-10 DIAGNOSIS — H61.23 EXCESSIVE EAR WAX, BILATERAL: Primary | ICD-10-CM

## 2021-08-10 PROCEDURE — 69209 REMOVE IMPACTED EAR WAX UNI: CPT | Mod: 50 | Performed by: PHYSICIAN ASSISTANT

## 2021-08-10 RX ORDER — TIZANIDINE HYDROCHLORIDE 4 MG/1
4 CAPSULE, GELATIN COATED ORAL 3 TIMES DAILY
COMMUNITY
End: 2022-03-24

## 2021-08-10 NOTE — PROGRESS NOTES
Assessment & Plan     Excessive ear wax, bilateral  Ear lavage with good results done by staff   Recheck of ears - clear canals no redness and TM's grey   - AR REMOVAL IMPACTED CERUMEN IRRIGATION/LVG UNILAT                 Return in about 3 months (around 11/10/2021) for Routine Visit, regular primary provider.    Adrienne Higgins PA-C  [facility] GATO Christine is a 66 year old who presents for the following health issues     HPI     Patient is here today to have her ears looked at. She has had wax build up before and sensitive.             Review of Systems         Objective    /76   Pulse 78   Resp 16   Wt 89.2 kg (196 lb 11.2 oz)   LMP 10/29/1998   SpO2 98%   BMI 34.84 kg/m    Body mass index is 34.84 kg/m .  Physical Exam   GENERAL: healthy, alert and no distress  HENT: normal cephalic/atraumatic and both ears: occluded with wax

## 2021-10-15 ENCOUNTER — OFFICE VISIT (OUTPATIENT)
Dept: INTERNAL MEDICINE | Facility: CLINIC | Age: 66
End: 2021-10-15
Payer: COMMERCIAL

## 2021-10-15 VITALS
BODY MASS INDEX: 35.58 KG/M2 | WEIGHT: 200.8 LBS | TEMPERATURE: 98.7 F | DIASTOLIC BLOOD PRESSURE: 78 MMHG | HEIGHT: 63 IN | HEART RATE: 81 BPM | SYSTOLIC BLOOD PRESSURE: 150 MMHG | OXYGEN SATURATION: 99 %

## 2021-10-15 DIAGNOSIS — R19.8 UMBILICUS DISCHARGE: ICD-10-CM

## 2021-10-15 DIAGNOSIS — E66.01 MORBID OBESITY (H): ICD-10-CM

## 2021-10-15 DIAGNOSIS — R03.0 ELEVATED BP WITHOUT DIAGNOSIS OF HYPERTENSION: ICD-10-CM

## 2021-10-15 DIAGNOSIS — R10.2 SUPRAPUBIC PAIN: Primary | ICD-10-CM

## 2021-10-15 LAB
ALBUMIN SERPL-MCNC: 3.8 G/DL (ref 3.4–5)
ALBUMIN UR-MCNC: NEGATIVE MG/DL
ALP SERPL-CCNC: 145 U/L (ref 40–150)
ALT SERPL W P-5'-P-CCNC: 23 U/L (ref 0–50)
ANION GAP SERPL CALCULATED.3IONS-SCNC: 7 MMOL/L (ref 3–14)
APPEARANCE UR: CLEAR
AST SERPL W P-5'-P-CCNC: 18 U/L (ref 0–45)
BILIRUB SERPL-MCNC: 0.3 MG/DL (ref 0.2–1.3)
BILIRUB UR QL STRIP: NEGATIVE
BUN SERPL-MCNC: 14 MG/DL (ref 7–30)
CALCIUM SERPL-MCNC: 9.3 MG/DL (ref 8.5–10.1)
CHLORIDE BLD-SCNC: 103 MMOL/L (ref 94–109)
CO2 SERPL-SCNC: 27 MMOL/L (ref 20–32)
COLOR UR AUTO: YELLOW
CREAT SERPL-MCNC: 0.85 MG/DL (ref 0.52–1.04)
ERYTHROCYTE [DISTWIDTH] IN BLOOD BY AUTOMATED COUNT: 13.2 % (ref 10–15)
GFR SERPL CREATININE-BSD FRML MDRD: 71 ML/MIN/1.73M2
GLUCOSE BLD-MCNC: 96 MG/DL (ref 70–99)
GLUCOSE UR STRIP-MCNC: NEGATIVE MG/DL
HCT VFR BLD AUTO: 42.2 % (ref 35–47)
HGB BLD-MCNC: 13.3 G/DL (ref 11.7–15.7)
HGB UR QL STRIP: NEGATIVE
KETONES UR STRIP-MCNC: NEGATIVE MG/DL
LEUKOCYTE ESTERASE UR QL STRIP: NEGATIVE
LIPASE SERPL-CCNC: 61 U/L (ref 73–393)
MCH RBC QN AUTO: 30.4 PG (ref 26.5–33)
MCHC RBC AUTO-ENTMCNC: 31.5 G/DL (ref 31.5–36.5)
MCV RBC AUTO: 97 FL (ref 78–100)
NITRATE UR QL: NEGATIVE
PH UR STRIP: 5.5 [PH] (ref 5–7)
PLATELET # BLD AUTO: 254 10E3/UL (ref 150–450)
POTASSIUM BLD-SCNC: 3.9 MMOL/L (ref 3.4–5.3)
PROT SERPL-MCNC: 8.4 G/DL (ref 6.8–8.8)
RBC # BLD AUTO: 4.37 10E6/UL (ref 3.8–5.2)
SODIUM SERPL-SCNC: 137 MMOL/L (ref 133–144)
SP GR UR STRIP: 1.01 (ref 1–1.03)
UROBILINOGEN UR STRIP-ACNC: 0.2 E.U./DL
WBC # BLD AUTO: 7.1 10E3/UL (ref 4–11)

## 2021-10-15 PROCEDURE — 83690 ASSAY OF LIPASE: CPT | Performed by: INTERNAL MEDICINE

## 2021-10-15 PROCEDURE — 36415 COLL VENOUS BLD VENIPUNCTURE: CPT | Performed by: INTERNAL MEDICINE

## 2021-10-15 PROCEDURE — 80053 COMPREHEN METABOLIC PANEL: CPT | Performed by: INTERNAL MEDICINE

## 2021-10-15 PROCEDURE — 85027 COMPLETE CBC AUTOMATED: CPT | Performed by: INTERNAL MEDICINE

## 2021-10-15 PROCEDURE — 81003 URINALYSIS AUTO W/O SCOPE: CPT | Performed by: INTERNAL MEDICINE

## 2021-10-15 PROCEDURE — 99214 OFFICE O/P EST MOD 30 MIN: CPT | Performed by: INTERNAL MEDICINE

## 2021-10-15 ASSESSMENT — MIFFLIN-ST. JEOR: SCORE: 1409.95

## 2021-10-15 NOTE — PROGRESS NOTES
"    Assessment & Plan     Suprapubic pain  Reports 6 mo + hx of pain. Generally diffuse tenderness on exam. Labs negative. Will get CT   - CBC with platelets; Future  - Comprehensive metabolic panel (BMP + Alb, Alk Phos, ALT, AST, Total. Bili, TP); Future  - Lipase; Future  - UA Macro with Reflex to Micro and Culture - lab collect; Future  - CBC with platelets  - Comprehensive metabolic panel (BMP + Alb, Alk Phos, ALT, AST, Total. Bili, TP)  - Lipase  - UA Macro with Reflex to Micro and Culture - lab collect    Umbilicus discharge  Minimal- quite normal. Exam is unremarkable.     Elevated BP without diagnosis of hypertension  Monitor at home. Consider Rx if remains elevated on f/u           {Provider  Link to [facility] Help Grid :414082}     BMI:   Estimated body mass index is 35.57 kg/m  as calculated from the following:    Height as of this encounter: 1.6 m (5' 3\").    Weight as of this encounter: 91.1 kg (200 lb 12.8 oz).   Weight management plan: Discussed healthy diet and exercise guidelines    See Patient Instructions    No follow-ups on file.    Everette Way MD  [facility] is a 66 year old who presents for the following health issues     HPI     Chief Complaint   Patient presents with     Derm Problem     pain in belly button area, pt states some oozing from area a few days ago, pt states now some soreness in that area as well           Review of Systems         Objective    BP (!) 154/72   Pulse 81   Temp 98.7  F (37.1  C) (Temporal)   Ht 1.6 m (5' 3\")   Wt 91.1 kg (200 lb 12.8 oz)   LMP 10/29/1998   SpO2 99%   BMI 35.57 kg/m    Body mass index is 35.57 kg/m .  Physical Exam   GENERAL APPEARANCE: alert, no distress and over weight  RESP: lungs clear to auscultation - no rales, rhonchi or wheezes  CV: regular rates and rhythm, normal S1 S2, no S3 or S4 and no murmur, click or rub  ABDOMEN: umbilicus normal, deep with typical debris but no other " focal findings. nontender on probing w/nasal spec.  More generalized tenderness in suprapubic location . No rebound. Obese abdomen.   Results for orders placed or performed in visit on 10/15/21 (from the past 24 hour(s))   CBC with platelets   Result Value Ref Range    WBC Count 7.1 4.0 - 11.0 10e3/uL    RBC Count 4.37 3.80 - 5.20 10e6/uL    Hemoglobin 13.3 11.7 - 15.7 g/dL    Hematocrit 42.2 35.0 - 47.0 %    MCV 97 78 - 100 fL    MCH 30.4 26.5 - 33.0 pg    MCHC 31.5 31.5 - 36.5 g/dL    RDW 13.2 10.0 - 15.0 %    Platelet Count 254 150 - 450 10e3/uL   Comprehensive metabolic panel (BMP + Alb, Alk Phos, ALT, AST, Total. Bili, TP)   Result Value Ref Range    Sodium 137 133 - 144 mmol/L    Potassium 3.9 3.4 - 5.3 mmol/L    Chloride 103 94 - 109 mmol/L    Carbon Dioxide (CO2) 27 20 - 32 mmol/L    Anion Gap 7 3 - 14 mmol/L    Urea Nitrogen 14 7 - 30 mg/dL    Creatinine 0.85 0.52 - 1.04 mg/dL    Calcium 9.3 8.5 - 10.1 mg/dL    Glucose 96 70 - 99 mg/dL    Alkaline Phosphatase 145 40 - 150 U/L    AST 18 0 - 45 U/L    ALT 23 0 - 50 U/L    Protein Total 8.4 6.8 - 8.8 g/dL    Albumin 3.8 3.4 - 5.0 g/dL    Bilirubin Total 0.3 0.2 - 1.3 mg/dL    GFR Estimate 71 >60 mL/min/1.73m2   UA Macro with Reflex to Micro and Culture - lab collect    Specimen: Urine, Midstream   Result Value Ref Range    Color Urine Yellow Colorless, Straw, Light Yellow, Yellow    Appearance Urine Clear Clear    Glucose Urine Negative Negative mg/dL    Bilirubin Urine Negative Negative    Ketones Urine Negative Negative mg/dL    Specific Gravity Urine 1.015 1.003 - 1.035    Blood Urine Negative Negative    pH Urine 5.5 5.0 - 7.0    Protein Albumin Urine Negative Negative mg/dL    Urobilinogen Urine 0.2 0.2, 1.0 E.U./dL    Nitrite Urine Negative Negative    Leukocyte Esterase Urine Negative Negative    Narrative    Microscopic not indicated

## 2021-10-15 NOTE — PATIENT INSTRUCTIONS
When checking your blood pressure at home make sure you do the followin. No exercise, caffeine or nicotine within the past 30 minutes  2. Sit down and relax for 5 to 10 minutes before checking  3.   Check your blood pressure 3 times  by 1 minute intervals.  Average the 3 readings to get your final value.  4.   If you do not like math instead of averaging the 3 values you can cross out the high and low readings and use the middle value.        Patient Education     Eating Heart-Healthy Food: Using the DASH Plan    Eating for your heart doesn t have to be hard or boring. You just need to know how to make healthier choices. The DASH eating plan has been developed to help you do just that. DASH stands for Dietary Approaches to Stop Hypertension. It is a plan that has been proven to be healthier for your heart and to lower your risk for high blood pressure. It can also help lower your risk for cancer, heart disease, osteoporosis, and diabetes.  Choosing from each food group  Choose foods from each of the food groups below each day. Try to get the recommended number of servings for each food group. The serving numbers are based on a diet of 2,000 calories a day. Talk with your healthcare provider if you re not sure about your calorie needs. Along with getting the correct servings, the DASH plan also advises less than 2,300 mg of salt (sodium) per day. Lowering sodium intake to 1,500 mg per day lowers blood pressure even more. (There's about 2,300 mg of sodium in 1 teaspoon of salt.)      Grains  Servings: 6 to 8 a day  A serving is:    1 slice bread    1 ounce dry cereal    Half a cup cooked rice, pasta or cereal  Best choices: Whole grains and any grains high in fiber. Vegetables  Servings: 4 to 5 a day  A serving is:    1 cup raw leafy vegetable    Half a cup cut-up raw or cooked vegetable    Half a cup vegetable juice  Best choices: Fresh or frozen vegetables prepared without added salt or fat.    Fruits  Servings: 4 to 5 a day  A serving is:    1 medium fruit    One-quarter cup dried fruit    Half a cup fresh, frozen, or canned fruit    Half a cup of 100% fruit juices  Best choices: A variety of fresh fruits of different colors. Whole fruits are a better choice than fruit juices. Low-fat or fat-free dairy  Servings: 2 to 3 a day  A serving is:    1 cup milk    1 cup yogurt    One and a half ounces cheese  Best choices: Skim or 1% milk, low-fat or fat-free yogurt or buttermilk, and low-fat cheeses.         Lean meats, poultry, fish  Servings: 6 or fewer a day  A serving is:    1 ounce cooked meats, poultry, or fish    1 egg  Best choices: Lean poultry and fish. Trim away visible fat. Broil, grill, roast, or boil instead of frying. Remove skin from poultry before eating. Limit how much red meat you eat.  Nuts, seeds, beans  Servings: 4 to 5 a week  A serving is:    One-third cup nuts (one and a half ounces)    2 tablespoons nut butter or seeds    Half a cup cooked dry beans or legumes  Best choices: Dry roasted nuts with no salt added, lentils, kidney beans, garbanzo beans, and whole larson beans.   Fats and oils  Servings: 2 to 3 a day  A serving is:    1 teaspoon vegetable oil    1 teaspoon soft margarine    1 tablespoon mayonnaise    2 tablespoons salad dressing  Best choices: Nut and vegetable oils (nontropical vegetable oils), such as olive and canola oil. Sweets  Servings: 5 a week or fewer  A serving is:    1 tablespoon sugar, maple syrup, or honey    1 tablespoon jam or jelly    1 half-ounce jelly beans (about 15)    1 cup lemonade  Best choices: Dried fruit can be a satisfying sweet. Choose low-fat sweets. And watch your serving sizes!      For more on the DASH eating plan, visit:  www.nhlbi.nih.gov/health/health-topics/topics/dash   Clarissa last reviewed this educational content on 7/1/2019 2000-2021 [employer]. All rights reserved. This information is not intended as a substitute  for professional medical care. Always follow your healthcare professional's instructions.

## 2021-11-15 ENCOUNTER — TELEPHONE (OUTPATIENT)
Dept: INTERNAL MEDICINE | Facility: CLINIC | Age: 66
End: 2021-11-15
Payer: COMMERCIAL

## 2021-11-15 NOTE — TELEPHONE ENCOUNTER
Call from patient who reports she never received lab results from 10/15. RN advised on follow-up plan per Dr. Way. She verbalizes understanding. Given patient representative phone number due to patients concerns about not being notified about lab results. Notified clinical supervisor.

## 2022-01-08 ENCOUNTER — TELEPHONE (OUTPATIENT)
Dept: NURSING | Facility: CLINIC | Age: 67
End: 2022-01-08
Payer: COMMERCIAL

## 2022-01-08 NOTE — TELEPHONE ENCOUNTER
Pt had a sore throat and a cough a week ago at the end of Dec. And is wondering if she should get her booster shot. Pt has a stiff neck today but she thinks it is the way that she sits.     Writer reviewed the following with patient:     If you have symptoms: stay home and away from others (self-isolate) until:    You've had no fever--and no medicine that reduces fever--for 1 full day (24 hours). And       Your other symptoms have gotten better. For example, your cough or breathing has improved. And     At least 10 days have passed since your symptoms started. (If you've been told by a doctor that you have a weak immune system, wait 20 days.)     Pt will decide if she wants to reschedule.     Gladis Grewal RN  [facility] Nurse Advisor 11:04 AM 1/8/2022

## 2022-01-11 ENCOUNTER — HOSPITAL ENCOUNTER (OUTPATIENT)
Dept: CT IMAGING | Facility: CLINIC | Age: 67
Discharge: HOME | End: 2022-01-11
Attending: INTERNAL MEDICINE | Admitting: INTERNAL MEDICINE
Payer: COMMERCIAL

## 2022-01-11 DIAGNOSIS — R10.2 SUPRAPUBIC PAIN: ICD-10-CM

## 2022-01-11 PROCEDURE — 74177 CT ABD & PELVIS W/CONTRAST: CPT

## 2022-01-11 PROCEDURE — 250N000009 HC RX 250: Performed by: INTERNAL MEDICINE

## 2022-01-11 PROCEDURE — 250N000011 HC RX IP 250 OP 636: Performed by: INTERNAL MEDICINE

## 2022-01-11 RX ORDER — IOPAMIDOL 755 MG/ML
98 INJECTION, SOLUTION INTRAVASCULAR ONCE
Status: COMPLETED | OUTPATIENT
Start: 2022-01-11 | End: 2022-01-11

## 2022-01-11 RX ADMIN — IOPAMIDOL 98 ML: 755 INJECTION, SOLUTION INTRAVENOUS at 15:45

## 2022-01-11 RX ADMIN — SODIUM CHLORIDE 68 ML: 9 INJECTION, SOLUTION INTRAVENOUS at 15:45

## 2022-03-03 ENCOUNTER — OFFICE VISIT (OUTPATIENT)
Dept: INTERNAL MEDICINE | Facility: CLINIC | Age: 67
End: 2022-03-03
Payer: COMMERCIAL

## 2022-03-03 VITALS
BODY MASS INDEX: 35.38 KG/M2 | SYSTOLIC BLOOD PRESSURE: 147 MMHG | HEIGHT: 63 IN | OXYGEN SATURATION: 100 % | HEART RATE: 76 BPM | DIASTOLIC BLOOD PRESSURE: 86 MMHG | TEMPERATURE: 98.6 F | WEIGHT: 199.7 LBS

## 2022-03-03 DIAGNOSIS — H61.23 BILATERAL IMPACTED CERUMEN: ICD-10-CM

## 2022-03-03 DIAGNOSIS — E04.9 ENLARGED THYROID: Primary | ICD-10-CM

## 2022-03-03 DIAGNOSIS — R59.9 ENLARGED LYMPH NODES: ICD-10-CM

## 2022-03-03 LAB — TSH SERPL DL<=0.005 MIU/L-ACNC: 1.24 MU/L (ref 0.4–4)

## 2022-03-03 PROCEDURE — 99214 OFFICE O/P EST MOD 30 MIN: CPT | Mod: 25 | Performed by: PHYSICIAN ASSISTANT

## 2022-03-03 PROCEDURE — 36415 COLL VENOUS BLD VENIPUNCTURE: CPT | Performed by: PHYSICIAN ASSISTANT

## 2022-03-03 PROCEDURE — 69209 REMOVE IMPACTED EAR WAX UNI: CPT | Mod: 50 | Performed by: PHYSICIAN ASSISTANT

## 2022-03-03 PROCEDURE — 84443 ASSAY THYROID STIM HORMONE: CPT | Performed by: PHYSICIAN ASSISTANT

## 2022-03-03 ASSESSMENT — ASTHMA QUESTIONNAIRES
ACT_TOTALSCORE: 24
QUESTION_2 LAST FOUR WEEKS HOW OFTEN HAVE YOU HAD SHORTNESS OF BREATH: NOT AT ALL
QUESTION_4 LAST FOUR WEEKS HOW OFTEN HAVE YOU USED YOUR RESCUE INHALER OR NEBULIZER MEDICATION (SUCH AS ALBUTEROL): ONCE A WEEK OR LESS
QUESTION_3 LAST FOUR WEEKS HOW OFTEN DID YOUR ASTHMA SYMPTOMS (WHEEZING, COUGHING, SHORTNESS OF BREATH, CHEST TIGHTNESS OR PAIN) WAKE YOU UP AT NIGHT OR EARLIER THAN USUAL IN THE MORNING: NOT AT ALL
QUESTION_1 LAST FOUR WEEKS HOW MUCH OF THE TIME DID YOUR ASTHMA KEEP YOU FROM GETTING AS MUCH DONE AT WORK, SCHOOL OR AT HOME: NONE OF THE TIME
ACT_TOTALSCORE: 24
QUESTION_5 LAST FOUR WEEKS HOW WOULD YOU RATE YOUR ASTHMA CONTROL: COMPLETELY CONTROLLED

## 2022-03-03 NOTE — LETTER
March 3, 2022      Nanci Levy  [address] [place] [id number]        Dear ,    We are writing to inform you of your test results.    Your test results fall within the expected range(s) or remain unchanged from previous results.  Please continue with current treatment plan.    Resulted Orders   TSH with free T4 reflex   Result Value Ref Range    TSH 1.24 0.40 - 4.00 mU/L       If you have any questions or concerns, please call the clinic at the number listed above.       Sincerely,      Stephie Choudhury PA-C

## 2022-03-03 NOTE — PROGRESS NOTES
"  Assessment & Plan     Enlarged thyroid  - further work up as below  - US Thyroid  - US Head Neck Soft Tissue  - TSH with free T4 reflex    Enlarged lymph nodes  - reviewed length of time for typical enlarged lymph node to resolve, since we are doing US of thyroid will get this as well  - follow up for recheck with new PCP    - US Thyroid  - US Head Neck Soft Tissue    Bilateral impacted cerumen  - CMA performed ear lavage   - REMOVE IMPACTED CERUMEN        No follow-ups on file.    LUKE [facility] is a 66 year old who presents for the following health issuesHistory of Present Illness     Reason for visit:  Lump node in neck  Symptom onset:  1-3 days ago  Symptoms include:  Swollen  Symptom intensity:  Mild  Symptom progression:  Staying the same  Had these symptoms before:  No  What makes it worse:  No  What makes it better:  No    She eats 2-3 servings of fruits and vegetables daily.She consumes 1 sweetened beverage(s) daily.She exercises with enough effort to increase her heart rate 9 or less minutes per day.  She exercises with enough effort to increase her heart rate 3 or less days per week. She is missing 1 dose(s) of medications per week.     Patient notes her thyroid feels bigger than it has in the past. She has a history of multinodular goiter. She notes feeling more tired, but has no skin, hair, temperature or bowel changes.             Objective    BP (!) 147/86 (BP Location: Left arm, Cuff Size: Adult Regular)   Pulse 76   Temp 98.6  F (37  C) (Oral)   Ht 1.6 m (5' 3\")   Wt 90.6 kg (199 lb 11.2 oz)   LMP 10/29/1998   SpO2 100%   BMI 35.38 kg/m    Body mass index is 35.38 kg/m .  Physical Exam   GENERAL: healthy, alert and no distress  HENT: ear canals - occluded with wax b/l, nose and mouth without ulcers or lesions  NECK: enlarged about 1 inch mobile and soft submandibular lymph node on right side, thyroid feels " slightly enlarged, no notable lumps or firmness felt

## 2022-03-10 ENCOUNTER — HOSPITAL ENCOUNTER (OUTPATIENT)
Dept: ULTRASOUND IMAGING | Facility: CLINIC | Age: 67
Discharge: HOME | End: 2022-03-10
Attending: PHYSICIAN ASSISTANT | Admitting: PHYSICIAN ASSISTANT
Payer: COMMERCIAL

## 2022-03-10 PROCEDURE — 76536 US EXAM OF HEAD AND NECK: CPT

## 2022-03-11 ENCOUNTER — TELEPHONE (OUTPATIENT)
Dept: INTERNAL MEDICINE | Facility: CLINIC | Age: 67
End: 2022-03-11
Payer: COMMERCIAL

## 2022-03-11 NOTE — TELEPHONE ENCOUNTER
Pt calling in inquiring about lab results from 3/3/22. Advised that a letter was also mailed to her home. Result letter read to pt at this time.     Jelly Horne RN

## 2022-03-16 ENCOUNTER — TELEPHONE (OUTPATIENT)
Dept: INTERNAL MEDICINE | Facility: CLINIC | Age: 67
End: 2022-03-16
Payer: COMMERCIAL

## 2022-03-17 NOTE — TELEPHONE ENCOUNTER
Hernando Card 3/3/22 re: neck/thyroid mass.     Ultrasound demonstrated small thyroid nodules (consider f/u ultrasound in 1 year), no lymphadenopathy, AND two small cystic structures in the area of palpable concern under the jaw. Repeat neck ultrasound recommended in 1-2 months.    Left VM with patient to discuss this with Dr. Way at her upcoming appointment with him (3/24/22).

## 2022-03-24 ENCOUNTER — OFFICE VISIT (OUTPATIENT)
Dept: INTERNAL MEDICINE | Facility: CLINIC | Age: 67
End: 2022-03-24
Payer: COMMERCIAL

## 2022-03-24 VITALS
SYSTOLIC BLOOD PRESSURE: 115 MMHG | WEIGHT: 192.9 LBS | RESPIRATION RATE: 16 BRPM | TEMPERATURE: 97.5 F | DIASTOLIC BLOOD PRESSURE: 70 MMHG | BODY MASS INDEX: 34.17 KG/M2 | OXYGEN SATURATION: 97 % | HEART RATE: 87 BPM

## 2022-03-24 DIAGNOSIS — J45.990 EXERCISE-INDUCED ASTHMA: ICD-10-CM

## 2022-03-24 DIAGNOSIS — J31.0 CHRONIC RHINITIS: Primary | ICD-10-CM

## 2022-03-24 DIAGNOSIS — E66.01 MORBID OBESITY (H): ICD-10-CM

## 2022-03-24 DIAGNOSIS — E04.2 MULTINODULAR GOITER (NONTOXIC): ICD-10-CM

## 2022-03-24 PROCEDURE — 99214 OFFICE O/P EST MOD 30 MIN: CPT | Performed by: INTERNAL MEDICINE

## 2022-03-24 RX ORDER — TIZANIDINE HYDROCHLORIDE 4 MG/1
4 CAPSULE, GELATIN COATED ORAL 3 TIMES DAILY
Status: CANCELLED | OUTPATIENT
Start: 2022-03-24

## 2022-03-24 NOTE — PROGRESS NOTES
Assessment & Plan     Chronic rhinitis  flonase     Exercise-induced asthma  Albuterol prn     Morbid obesity (H)  Weight loss    Multinodular goiter (nontoxic)  Longstanding.  Recent bx - see resulsts. rec'd f/u u/s but no FNA at this time.       I spent a total of 30 minutes on the day of the visit.   Time spent doing chart review, history and exam, documentation and further activities per the note           Return in about 3 months (around 6/24/2022) for Annual Wellness Visit.    Everette Way MD  [facility] GATO Christine is a 66 year old who presents for the following health issues     History of Present Illness       Reason for visit:  Establish care  Symptom onset:  3-7 days ago  Symptoms include:  Stuffy nose  Symptom intensity:  Mild  Symptom progression:  Improving  Had these symptoms before:  No  What makes it worse:  No  What makes it better:  Rest    She eats 2-3 servings of fruits and vegetables daily.She consumes 1 sweetened beverage(s) daily.She exercises with enough effort to increase her heart rate 9 or less minutes per day.  She exercises with enough effort to increase her heart rate 3 or less days per week. She is missing 1 dose(s) of medications per week.  She is not taking prescribed medications regularly due to remembering to take.     F/u on thyroid u/s earlier this month-     Pt would like to discuss getting a refill for tizanidine to help with muscles in neck and back. Pt to discuss CT of the abdomen from 1/11/22. Pt also has a lump in throat but, tsh was wnl on 3/3/2022.             Review of Systems         Objective    /70 (BP Location: Right arm, Patient Position: Chair, Cuff Size: Adult Regular)   Pulse 87   Temp 97.5  F (36.4  C) (Temporal)   Resp 16   Wt 87.5 kg (192 lb 14.4 oz)   LMP 10/29/1998   SpO2 97%   BMI 34.17 kg/m    Body mass index is 34.17 kg/m .  Physical Exam   GENERAL: healthy, alert and no distress  HENT: ear  canals and TM's normal, nose and mouth without ulcers or lesions  NECK: no adenopathy, no asymmetry, masses, or scars, thyromegaly approximately 3 times normal  RESP: lungs clear to auscultation - no rales, rhonchi or wheezes  CV: regular rate and rhythm, normal S1 S2, no S3 or S4, no murmur, click or rub, no peripheral edema and peripheral pulses strong    Office Visit on 03/03/2022   Component Date Value Ref Range Status     TSH 03/03/2022 1.24  0.40 - 4.00 mU/L Final

## 2022-04-03 PROBLEM — R05.3 PERSISTENT COUGH: Status: RESOLVED | Noted: 2019-06-29 | Resolved: 2022-04-03

## 2022-04-03 PROBLEM — R20.0 LEFT FACIAL NUMBNESS: Status: RESOLVED | Noted: 2018-02-06 | Resolved: 2022-04-03

## 2022-04-03 PROBLEM — R06.2 WHEEZING: Status: RESOLVED | Noted: 2019-06-29 | Resolved: 2022-04-03

## 2022-04-03 PROBLEM — Z12.4 CERVICAL CANCER SCREENING: Status: RESOLVED | Noted: 2018-09-18 | Resolved: 2022-04-03

## 2022-07-15 ENCOUNTER — HOSPITAL ENCOUNTER (OUTPATIENT)
Dept: MAMMOGRAPHY | Facility: CLINIC | Age: 67
Discharge: HOME | End: 2022-07-15
Attending: INTERNAL MEDICINE | Admitting: INTERNAL MEDICINE
Payer: COMMERCIAL

## 2022-07-15 DIAGNOSIS — Z12.31 VISIT FOR SCREENING MAMMOGRAM: ICD-10-CM

## 2022-07-15 PROCEDURE — 77067 SCR MAMMO BI INCL CAD: CPT

## 2022-08-09 ENCOUNTER — OFFICE VISIT (OUTPATIENT)
Dept: INTERNAL MEDICINE | Facility: CLINIC | Age: 67
End: 2022-08-09
Payer: COMMERCIAL

## 2022-08-09 VITALS
HEART RATE: 66 BPM | HEIGHT: 63 IN | WEIGHT: 195.2 LBS | TEMPERATURE: 97.2 F | DIASTOLIC BLOOD PRESSURE: 74 MMHG | OXYGEN SATURATION: 100 % | SYSTOLIC BLOOD PRESSURE: 132 MMHG | BODY MASS INDEX: 34.59 KG/M2

## 2022-08-09 DIAGNOSIS — Z78.0 POST-MENOPAUSAL: ICD-10-CM

## 2022-08-09 DIAGNOSIS — Z13.220 SCREENING FOR HYPERLIPIDEMIA: ICD-10-CM

## 2022-08-09 DIAGNOSIS — J45.990 EXERCISE-INDUCED ASTHMA: ICD-10-CM

## 2022-08-09 DIAGNOSIS — Z00.00 ENCOUNTER FOR MEDICARE ANNUAL WELLNESS EXAM: Primary | ICD-10-CM

## 2022-08-09 DIAGNOSIS — Z13.1 SCREENING FOR DIABETES MELLITUS: ICD-10-CM

## 2022-08-09 PROCEDURE — 80048 BASIC METABOLIC PNL TOTAL CA: CPT | Performed by: INTERNAL MEDICINE

## 2022-08-09 PROCEDURE — 36415 COLL VENOUS BLD VENIPUNCTURE: CPT | Performed by: INTERNAL MEDICINE

## 2022-08-09 PROCEDURE — G0009 ADMIN PNEUMOCOCCAL VACCINE: HCPCS | Performed by: INTERNAL MEDICINE

## 2022-08-09 PROCEDURE — 80061 LIPID PANEL: CPT | Performed by: INTERNAL MEDICINE

## 2022-08-09 PROCEDURE — G0438 PPPS, INITIAL VISIT: HCPCS | Performed by: INTERNAL MEDICINE

## 2022-08-09 PROCEDURE — 90732 PPSV23 VACC 2 YRS+ SUBQ/IM: CPT | Performed by: INTERNAL MEDICINE

## 2022-08-09 RX ORDER — ALBUTEROL SULFATE 90 UG/1
2 AEROSOL, METERED RESPIRATORY (INHALATION) 2 TIMES DAILY
Qty: 8.5 G | Refills: 11 | Status: SHIPPED | OUTPATIENT
Start: 2022-08-09 | End: 2023-08-11

## 2022-08-09 ASSESSMENT — ENCOUNTER SYMPTOMS
CHILLS: 0
NAUSEA: 0
HEADACHES: 0
HEMATOCHEZIA: 0
BREAST MASS: 0
SORE THROAT: 0
SHORTNESS OF BREATH: 0
DIZZINESS: 0
WEAKNESS: 0
PARESTHESIAS: 0
ABDOMINAL PAIN: 0
MYALGIAS: 1
HEARTBURN: 0
HEMATURIA: 0
DIARRHEA: 0
PALPITATIONS: 0
FEVER: 0
ARTHRALGIAS: 1
NERVOUS/ANXIOUS: 0
EYE PAIN: 0
CONSTIPATION: 0
FREQUENCY: 0
DYSURIA: 0
COUGH: 0

## 2022-08-09 ASSESSMENT — ASTHMA QUESTIONNAIRES: ACT_TOTALSCORE: 23

## 2022-08-09 ASSESSMENT — ACTIVITIES OF DAILY LIVING (ADL): CURRENT_FUNCTION: NO ASSISTANCE NEEDED

## 2022-08-09 NOTE — PROGRESS NOTES
"SUBJECTIVE:   Nanci Levy is a 67 year old female who presents for Preventive Visit.      Patient has been advised of split billing requirements and indicates understanding: Yes  Are you in the first 12 months of your Medicare coverage?  No    Healthy Habits:     In general, how would you rate your overall health?  Good    Frequency of exercise:  2-3 days/week    Duration of exercise:  15-30 minutes    Do you usually eat at least 4 servings of fruit and vegetables a day, include whole grains    & fiber and avoid regularly eating high fat or \"junk\" foods?  Yes    Taking medications regularly:  Yes    Medication side effects:  None    Ability to successfully perform activities of daily living:  No assistance needed    Home Safety:  No safety concerns identified    Hearing Impairment:  No hearing concerns    In the past 6 months, have you been bothered by leaking of urine?  No    In general, how would you rate your overall mental or emotional health?  Good      PHQ-2 Total Score: 0    Additional concerns today:  Yes    Do you feel safe in your environment? Yes    Have you ever done Advance Care Planning? (For example, a Health Directive, POLST, or a discussion with a medical provider or your loved ones about your wishes): No, advance care planning information given to patient to review.  Patient plans to discuss their wishes with loved ones or provider.         Fall risk  Fallen 2 or more times in the past year?: No  Any fall with injury in the past year?: No    Cognitive Screening   1) Repeat 3 items (Leader, Season, Table)    2) Clock draw: NORMAL  3) 3 item recall: Recalls 3 objects  Results: 3 items recalled: COGNITIVE IMPAIRMENT LESS LIKELY    Mini-CogTM Copyright [employer]. Licensed by the author for use in [facility]; reprinted with permission (vinicio@.[facility]). All rights reserved.      Do you have sleep apnea, excessive snoring or daytime drowsiness?: yes    Reviewed and updated as needed " this visit by clinical staff   Tobacco  Allergies  Meds  Problems    Fam Hx            Reviewed and updated as needed this visit by Provider   Tobacco    Problems    Fam Hx           Social History     Tobacco Use     Smoking status: Never Smoker     Smokeless tobacco: Never Used   Substance Use Topics     Alcohol use: No         Alcohol Use 8/9/2022   Prescreen: >3 drinks/day or >7 drinks/week? No   Prescreen: >3 drinks/day or >7 drinks/week? -               Current providers sharing in care for this patient include:   Patient Care Team:  System, Provider Not In as PCP - General (Clinic)  Everette Way MD as Assigned PCP    The following health maintenance items are reviewed in Epic and correct as of today:  Health Maintenance Due   Topic Date Due     DEXA  Never done     ZOSTER IMMUNIZATION (1 of 2) Never done     LIPID  04/08/2022     ASTHMA ACTION PLAN  04/08/2022     Labs reviewed in [facility]      Breast CA Risk Assessment (FHS-7) 8/9/2022   Do you have a family history of breast, colon, or ovarian cancer? No / Unknown         Mammogram Screening: Recommended mammography every 1-2 years with patient discussion and risk factor consideration  Pertinent mammograms are reviewed under the imaging tab.    Review of Systems   Constitutional: Negative for chills and fever.   HENT: Negative for congestion, ear pain, hearing loss and sore throat.    Eyes: Positive for visual disturbance. Negative for pain.   Respiratory: Negative for cough and shortness of breath.    Cardiovascular: Positive for peripheral edema. Negative for chest pain and palpitations.   Gastrointestinal: Negative for abdominal pain, constipation, diarrhea, heartburn, hematochezia and nausea.   Breasts:  Negative for tenderness, breast mass and discharge.   Genitourinary: Negative for dysuria, frequency, genital sores, hematuria, pelvic pain, urgency, vaginal bleeding and vaginal discharge.   Musculoskeletal: Positive for arthralgias and  "myalgias.   Skin: Negative for rash.   Neurological: Negative for dizziness, weakness, headaches and paresthesias.   Psychiatric/Behavioral: Negative for mood changes. The patient is not nervous/anxious.          OBJECTIVE:   /74   Pulse 66   Temp 97.2  F (36.2  C) (Temporal)   Ht 1.6 m (5' 3\")   Wt 88.5 kg (195 lb 3.2 oz)   LMP 10/29/1998   SpO2 100%   BMI 34.58 kg/m   Estimated body mass index is 34.58 kg/m  as calculated from the following:    Height as of this encounter: 1.6 m (5' 3\").    Weight as of this encounter: 88.5 kg (195 lb 3.2 oz).  Physical Exam  GENERAL APPEARANCE: healthy, alert and no distress  EYES: Eyes grossly normal to inspection, PERRL and conjunctivae and sclerae normal  HENT: ear canals and TM's normal, nose and mouth without ulcers or lesions, oropharynx clear and oral mucous membranes moist  NECK: no adenopathy, no asymmetry, masses, or scars and thyroid normal to palpation  RESP: lungs clear to auscultation - no rales, rhonchi or wheezes  BREAST: normal without masses, tenderness or nipple discharge and no palpable axillary masses or adenopathy  CV: regular rate and rhythm, normal S1 S2, no S3 or S4, no murmur, click or rub, no peripheral edema and peripheral pulses strong  ABDOMEN: soft, nontender, no hepatosplenomegaly, no masses and bowel sounds normal  MS: no musculoskeletal defects are noted and gait is age appropriate without ataxia  SKIN: no suspicious lesions or rashes  NEURO: Normal strength and tone, sensory exam grossly normal, mentation intact and speech normal  PSYCH: mentation appears normal and affect normal/bright    No results found for any visits on 08/09/22.     ASSESSMENT / PLAN:       ICD-10-CM    1. Encounter for Medicare annual wellness exam  Z00.00    2. Screening for hyperlipidemia  Z13.220 Lipid panel reflex to direct LDL Non-fasting     Lipid panel reflex to direct LDL Non-fasting   3. Post-menopausal  Z78.0 DEXA HIP/PELVIS/SPINE - Future   4. " "Screening for diabetes mellitus  Z13.1 Basic metabolic panel  (Ca, Cl, CO2, Creat, Gluc, K, Na, BUN)     Basic metabolic panel  (Ca, Cl, CO2, Creat, Gluc, K, Na, BUN)   5. Exercise-induced asthma  J45.990 albuterol (PROAIR HFA/PROVENTIL HFA/VENTOLIN HFA) 108 (90 Base) MCG/ACT inhaler     -Updated screening, immunizations, prevention.  Please see health maintenance list, care gaps  - check home BPs. Focus on diet/exercise. Recheck 2-3 mo     Patient has been advised of split billing requirements and indicates understanding: Yes    COUNSELING:  Reviewed preventive health counseling, as reflected in patient instructions    Estimated body mass index is 34.58 kg/m  as calculated from the following:    Height as of this encounter: 1.6 m (5' 3\").    Weight as of this encounter: 88.5 kg (195 lb 3.2 oz).    Weight management plan: Discussed healthy diet and exercise guidelines    She reports that she has never smoked. She has never used smokeless tobacco.      Appropriate preventive services were discussed with this patient, including applicable screening as appropriate for cardiovascular disease, diabetes, osteopenia/osteoporosis, and glaucoma.  As appropriate for age/gender, discussed screening for colorectal cancer, prostate cancer, breast cancer, and cervical cancer. Checklist reviewing preventive services available has been given to the patient.    Reviewed patients plan of care and provided an AVS. The Basic Care Plan (routine screening as documented in Health Maintenance) for Nanci meets the Care Plan requirement. This Care Plan has been established and reviewed with the Patient.    Counseling Resources:  ATP IV Guidelines  Pooled Cohorts Equation Calculator  Breast Cancer Risk Calculator  Breast Cancer: Medication to Reduce Risk  FRAX Risk Assessment  ICSI Preventive Guidelines  Dietary Guidelines for Americans, 2010  [employer] MyPlate  ASA Prophylaxis  Lung CA Screening    Everette Way MD  [facility] " [facility]    Identified Health Risks:    The 10-year ASCVD risk score (Monika RIVEAR Jr., et al., 2013) is: 9.5%    Values used to calculate the score:      Age: 67 years      Sex: Female      Is Non- : No      Diabetic: No      Tobacco smoker: No      Systolic Blood Pressure: 132 mmHg      Is BP treated: No      HDL Cholesterol: 51 mg/dL      Total Cholesterol: 217 mg/dL

## 2022-08-09 NOTE — LETTER
August 24, 2022      Nanci Levy  [address] [facility] [address]        Dear ,    We are writing to inform you of your test results.    Using the new American Heart Association risk calculator your 10 yr risk of global cardiovascular disease (heart attack, stroke) is 9%.    At any risk level > 7.5-10% we should consider the use of a statin cholesterol lowering medication.  This has been shown to help prevent heart disease and strokes when risk is at or above this level.    Please make an appointment with your provider to review or follow up on your test results.  This can be in-person or virtually.  Appointments can be made by calling the # above.    Resulted Orders   Lipid panel reflex to direct LDL Non-fasting   Result Value Ref Range    Cholesterol 191 <200 mg/dL    Triglycerides 88 <150 mg/dL    Direct Measure HDL 52 >=50 mg/dL    LDL Cholesterol Calculated 121 (H) <=100 mg/dL    Non HDL Cholesterol 139 (H) <130 mg/dL    Patient Fasting > 8hrs? Yes     Narrative    Cholesterol  Desirable:  <200 mg/dL    Triglycerides  Normal:  Less than 150 mg/dL  Borderline High:  150-199 mg/dL  High:  200-499 mg/dL  Very High:  Greater than or equal to 500 mg/dL    Direct Measure HDL  Female:  Greater than or equal to 50 mg/dL   Male:  Greater than or equal to 40 mg/dL    LDL Cholesterol  Desirable:  <100mg/dL  Above Desirable:  100-129 mg/dL   Borderline High:  130-159 mg/dL   High:  160-189 mg/dL   Very High:  >= 190 mg/dL    Non HDL Cholesterol  Desirable:  130 mg/dL  Above Desirable:  130-159 mg/dL  Borderline High:  160-189 mg/dL  High:  190-219 mg/dL  Very High:  Greater than or equal to 220 mg/dL   Basic metabolic panel  (Ca, Cl, CO2, Creat, Gluc, K, Na, BUN)   Result Value Ref Range    Sodium 142 133 - 144 mmol/L    Potassium 4.1 3.4 - 5.3 mmol/L    Chloride 108 94 - 109 mmol/L    Carbon Dioxide (CO2) 29 20 - 32 mmol/L    Anion Gap 5 3 - 14 mmol/L    Urea Nitrogen 14 7 - 30 mg/dL     Creatinine 0.84 0.52 - 1.04 mg/dL    Calcium 8.9 8.5 - 10.1 mg/dL    Glucose 88 70 - 99 mg/dL    GFR Estimate 75 >60 mL/min/1.73m2      Comment:      Effective December 21, 2021 eGFRcr in adults is calculated using the 2021 CKD-EPI creatinine equation which includes age and gender (Marshall et al., NEJM, DOI: 10.1056/CTLIhv6419716)       If you have any questions or concerns, please call the clinic at the number listed above.       Sincerely,      Everette Way M.D.  Dept of Internal Medicine- [facility]

## 2022-08-09 NOTE — PATIENT INSTRUCTIONS
When checking your blood pressure at home make sure you do the following:  No exercise, caffeine or nicotine within the past 30 minutes  Sit down and relax for 5 to 10 minutes before checking  3.   Check your blood pressure 3 times  by 1 minute intervals.  Average the 3 readings to get your final value.  4.   If you do not like math instead of averaging the 3 values you can cross out the high and low readings and use the middle value.        Patient Education   Personalized Prevention Plan  You are due for the preventive services outlined below.  Your care team is available to assist you in scheduling these services.  If you have already completed any of these items, please share that information with your care team to update in your medical record.  Health Maintenance Due   Topic Date Due    Osteoporosis Screening  Never done    Zoster (Shingles) Vaccine (1 of 2) Never done    Pneumococcal Vaccine (2 - PPSV23 or PCV20) 09/10/2019    Cholesterol Lab  04/08/2022    Asthma Action Plan - yearly  04/08/2022    Asthma Control Test  09/03/2022      Patient Education     Eating Heart-Healthy Food: Using the DASH Plan    Eating for your heart doesn t have to be hard or boring. You just need to know how to make healthier choices. The DASH eating plan has been developed to help you do just that. DASH stands for Dietary Approaches to Stop Hypertension. It is a plan that has been proven to be healthier for your heart and to lower your risk for high blood pressure. It can also help lower your risk for cancer, heart disease, osteoporosis, and diabetes.  Choosing from each food group  Choose foods from each of the food groups below each day. Try to get the recommended number of servings for each food group. The serving numbers are based on a diet of 2,000 calories a day. Talk with your healthcare provider if you re not sure about your calorie needs. Along with getting the correct servings, the DASH plan also advises less  than 2,300 mg of salt (sodium) per day. Lowering sodium intake to 1,500 mg per day lowers blood pressure even more. (There's about 2,300 mg of sodium in 1 teaspoon of salt.)      Grains  Servings: 6 to 8 a day  A serving is:  1 slice bread  1 ounce dry cereal  Half a cup cooked rice, pasta or cereal  Best choices: Whole grains and any grains high in fiber. Vegetables  Servings: 4 to 5 a day  A serving is:  1 cup raw leafy vegetable  Half a cup cut-up raw or cooked vegetable  Half a cup vegetable juice  Best choices: Fresh or frozen vegetables prepared without added salt or fat.   Fruits  Servings: 4 to 5 a day  A serving is:  1 medium fruit  One-quarter cup dried fruit  Half a cup fresh, frozen, or canned fruit  Half a cup of 100% fruit juices  Best choices: A variety of fresh fruits of different colors. Whole fruits are a better choice than fruit juices. Low-fat or fat-free dairy  Servings: 2 to 3 a day  A serving is:  1 cup milk  1 cup yogurt  One and a half ounces cheese  Best choices: Skim or 1% milk, low-fat or fat-free yogurt or buttermilk, and low-fat cheeses.         Lean meats, poultry, fish  Servings: 6 or fewer a day  A serving is:  1 ounce cooked meats, poultry, or fish  1 egg  Best choices: Lean poultry and fish. Trim away visible fat. Broil, grill, roast, or boil instead of frying. Remove skin from poultry before eating. Limit how much red meat you eat.  Nuts, seeds, beans  Servings: 4 to 5 a week  A serving is:  One-third cup nuts (one and a half ounces)  2 tablespoons nut butter or seeds  Half a cup cooked dry beans or legumes  Best choices: Dry roasted nuts with no salt added, lentils, kidney beans, garbanzo beans, and whole larson beans.   Fats and oils  Servings: 2 to 3 a day  A serving is:  1 teaspoon vegetable oil  1 teaspoon soft margarine  1 tablespoon mayonnaise  2 tablespoons salad dressing  Best choices: Nut and vegetable oils (nontropical vegetable oils), such as olive and canola oil.  Sweets  Servings: 5 a week or fewer  A serving is:  1 tablespoon sugar, maple syrup, or honey  1 tablespoon jam or jelly  1 half-ounce jelly beans (about 15)  1 cup lemonade  Best choices: Dried fruit can be a satisfying sweet. Choose low-fat sweets. And watch your serving sizes!      For more on the DASH eating plan, visit:  www.nhlbi.nih.gov/health/health-topics/topics/dash   Clarissa last reviewed this educational content on 7/1/2019 2000-2021 [employer]. All rights reserved. This information is not intended as a substitute for professional medical care. Always follow your healthcare professional's instructions.

## 2022-08-10 LAB
ANION GAP SERPL CALCULATED.3IONS-SCNC: 5 MMOL/L (ref 3–14)
BUN SERPL-MCNC: 14 MG/DL (ref 7–30)
CALCIUM SERPL-MCNC: 8.9 MG/DL (ref 8.5–10.1)
CHLORIDE BLD-SCNC: 108 MMOL/L (ref 94–109)
CHOLEST SERPL-MCNC: 191 MG/DL
CO2 SERPL-SCNC: 29 MMOL/L (ref 20–32)
CREAT SERPL-MCNC: 0.84 MG/DL (ref 0.52–1.04)
FASTING STATUS PATIENT QL REPORTED: YES
GFR SERPL CREATININE-BSD FRML MDRD: 75 ML/MIN/1.73M2
GLUCOSE BLD-MCNC: 88 MG/DL (ref 70–99)
HDLC SERPL-MCNC: 52 MG/DL
LDLC SERPL CALC-MCNC: 121 MG/DL
NONHDLC SERPL-MCNC: 139 MG/DL
POTASSIUM BLD-SCNC: 4.1 MMOL/L (ref 3.4–5.3)
SODIUM SERPL-SCNC: 142 MMOL/L (ref 133–144)
TRIGL SERPL-MCNC: 88 MG/DL

## 2023-07-17 ENCOUNTER — HOSPITAL ENCOUNTER (OUTPATIENT)
Dept: MAMMOGRAPHY | Facility: CLINIC | Age: 68
Discharge: HOME | End: 2023-07-17
Attending: INTERNAL MEDICINE
Payer: COMMERCIAL

## 2023-07-17 ENCOUNTER — HOSPITAL ENCOUNTER (OUTPATIENT)
Dept: BONE DENSITY | Facility: CLINIC | Age: 68
Discharge: HOME | End: 2023-07-17
Attending: INTERNAL MEDICINE
Payer: COMMERCIAL

## 2023-07-17 DIAGNOSIS — Z12.31 VISIT FOR SCREENING MAMMOGRAM: ICD-10-CM

## 2023-07-17 DIAGNOSIS — Z78.0 POST-MENOPAUSAL: ICD-10-CM

## 2023-07-17 PROCEDURE — 77067 SCR MAMMO BI INCL CAD: CPT

## 2023-07-17 PROCEDURE — 77080 DXA BONE DENSITY AXIAL: CPT

## 2023-08-05 ENCOUNTER — APPOINTMENT (OUTPATIENT)
Dept: ULTRASOUND IMAGING | Facility: CLINIC | Age: 68
End: 2023-08-05
Attending: PHYSICIAN ASSISTANT
Payer: COMMERCIAL

## 2023-08-05 ENCOUNTER — HOSPITAL ENCOUNTER (EMERGENCY)
Facility: CLINIC | Age: 68
Discharge: HOME | End: 2023-08-05
Attending: PHYSICIAN ASSISTANT | Admitting: PHYSICIAN ASSISTANT
Payer: COMMERCIAL

## 2023-08-05 VITALS
HEART RATE: 83 BPM | TEMPERATURE: 96.5 F | SYSTOLIC BLOOD PRESSURE: 143 MMHG | DIASTOLIC BLOOD PRESSURE: 86 MMHG | OXYGEN SATURATION: 97 % | RESPIRATION RATE: 16 BRPM

## 2023-08-05 DIAGNOSIS — R03.0 ELEVATED BLOOD PRESSURE READING: ICD-10-CM

## 2023-08-05 DIAGNOSIS — R25.2 MUSCLE CRAMPS: ICD-10-CM

## 2023-08-05 LAB
ANION GAP SERPL CALCULATED.3IONS-SCNC: 10 MMOL/L (ref 7–15)
BASOPHILS # BLD AUTO: 0 10E3/UL (ref 0–0.2)
BASOPHILS NFR BLD AUTO: 0 %
BUN SERPL-MCNC: 17.8 MG/DL (ref 8–23)
CALCIUM SERPL-MCNC: 10.1 MG/DL (ref 8.8–10.2)
CHLORIDE SERPL-SCNC: 101 MMOL/L (ref 98–107)
CREAT SERPL-MCNC: 1 MG/DL (ref 0.51–0.95)
DEPRECATED HCO3 PLAS-SCNC: 26 MMOL/L (ref 22–29)
EOSINOPHIL # BLD AUTO: 0.1 10E3/UL (ref 0–0.7)
EOSINOPHIL NFR BLD AUTO: 1 %
ERYTHROCYTE [DISTWIDTH] IN BLOOD BY AUTOMATED COUNT: 13.2 % (ref 10–15)
GFR SERPL CREATININE-BSD FRML MDRD: 60 ML/MIN/1.73M2
GLUCOSE SERPL-MCNC: 102 MG/DL (ref 70–99)
HCT VFR BLD AUTO: 39.8 % (ref 35–47)
HGB BLD-MCNC: 12.7 G/DL (ref 11.7–15.7)
IMM GRANULOCYTES # BLD: 0 10E3/UL
IMM GRANULOCYTES NFR BLD: 1 %
LYMPHOCYTES # BLD AUTO: 1.9 10E3/UL (ref 0.8–5.3)
LYMPHOCYTES NFR BLD AUTO: 27 %
MAGNESIUM SERPL-MCNC: 2.1 MG/DL (ref 1.7–2.3)
MCH RBC QN AUTO: 30.5 PG (ref 26.5–33)
MCHC RBC AUTO-ENTMCNC: 31.9 G/DL (ref 31.5–36.5)
MCV RBC AUTO: 95 FL (ref 78–100)
MONOCYTES # BLD AUTO: 0.6 10E3/UL (ref 0–1.3)
MONOCYTES NFR BLD AUTO: 8 %
NEUTROPHILS # BLD AUTO: 4.5 10E3/UL (ref 1.6–8.3)
NEUTROPHILS NFR BLD AUTO: 63 %
NRBC # BLD AUTO: 0 10E3/UL
NRBC BLD AUTO-RTO: 0 /100
PLATELET # BLD AUTO: 246 10E3/UL (ref 150–450)
POTASSIUM SERPL-SCNC: 4.2 MMOL/L (ref 3.4–5.3)
RBC # BLD AUTO: 4.17 10E6/UL (ref 3.8–5.2)
SODIUM SERPL-SCNC: 137 MMOL/L (ref 136–145)
WBC # BLD AUTO: 7.2 10E3/UL (ref 4–11)

## 2023-08-05 PROCEDURE — 99284 EMERGENCY DEPT VISIT MOD MDM: CPT | Mod: 25

## 2023-08-05 PROCEDURE — 83735 ASSAY OF MAGNESIUM: CPT | Performed by: PHYSICIAN ASSISTANT

## 2023-08-05 PROCEDURE — 80048 BASIC METABOLIC PNL TOTAL CA: CPT | Performed by: PHYSICIAN ASSISTANT

## 2023-08-05 PROCEDURE — 36415 COLL VENOUS BLD VENIPUNCTURE: CPT | Performed by: PHYSICIAN ASSISTANT

## 2023-08-05 PROCEDURE — 85014 HEMATOCRIT: CPT | Performed by: PHYSICIAN ASSISTANT

## 2023-08-05 PROCEDURE — 93970 EXTREMITY STUDY: CPT

## 2023-08-05 RX ORDER — CYCLOBENZAPRINE HCL 10 MG
10 TABLET ORAL 3 TIMES DAILY PRN
Qty: 20 TABLET | Refills: 0 | Status: SHIPPED | OUTPATIENT
Start: 2023-08-05 | End: 2023-08-12

## 2023-08-05 ASSESSMENT — ACTIVITIES OF DAILY LIVING (ADL): ADLS_ACUITY_SCORE: 35

## 2023-08-05 NOTE — ED TRIAGE NOTES
Pt reports left leg cramp that became severe where she could not walk she also cramping  on her right hand

## 2023-08-05 NOTE — DISCHARGE INSTRUCTIONS
Your labs and imaging are reassuring today.  Continue supportive cares at home including rest, drinking plenty of fluid, and Tylenol for any muscle aches. You will be sent home with a muscle relaxant medication.  Be aware this medication may cause drowsiness.  Do not drive or operate heavy machinery while taking this medication.  Follow-up with your primary care provider for recheck in 1 week.  For any new or worsening symptoms, present to the ED.

## 2023-08-05 NOTE — ED PROVIDER NOTES
History     Chief Complaint:  leg and hand cramping      HPI   Nanci Vergara is a 68 year old female with history of sciatica who presents to the ED with her   and daughter for cramping. The patient reports she had an episode of cramping in bilateral hands as well as in her left hip with radiation into her leg earlier today while curling her hair. Her symptoms improved after a few minutes. She states she has had jo horses occasionally but no history of similar cramping. She has been eating and drinking fluids well and denies syncope or calf pain. She denies recent medications changes or falls, or history of heart failure, hypertension, or low calcium levels.     Independent Historian:   None - Patient Only    Review of External Notes:   None    Medications:    Aspirin  Albuterol    Past Medical History:    Arthritis  Exercise induced asthma  Multinodular goiter  Vitamin D deficiency  Hearing Loss  Anemia  Arthralgia of temporomandibular joint  Hyperlipidemia  Chronic pain of left knee  Sciatica  Bilateral Leg Edema  Paresthesias  GERD  Obesity    Past Surgical History:    Hysterectomy     Physical Exam   Patient Vitals for the past 24 hrs:   BP Temp Pulse Resp SpO2   08/05/23 1558 (!) 143/86 (!) 96.5  F (35.8  C) 83 16 97 %      Physical Exam  Constitutional: Alert, attentive, GCS 15  HENT: Nose: Nose normal.   Mouth/Throat: Oropharynx is clear, mucous membranes are moist   Eyes:  EOM are normal.   CV:  Regular rate and rhythm; no murmurs, rubs or gallups  Chest: Effort normal and breath sounds normal.   GI:   There is no tenderness. No distension. Normal bowel sounds  MSK:  Normal range of motion.   Neuro:  Alert, attentive, Oriented x 3. No facial asymmetry. CN II-XII intact. 5/5 strength in upper and lower extremities. Normal range of motion in all four extremities. Distal sensation in hands and feet intact. Normal gait. No motor weakness. Normal coordination.  Skin:  Skin is warm and  dry.    Emergency Department Course   Laboratory:  Labs Ordered and Resulted from Time of ED Arrival to Time of ED Departure   BASIC METABOLIC PANEL - Abnormal       Result Value    Sodium 137      Potassium 4.2      Chloride 101      Carbon Dioxide (CO2) 26      Anion Gap 10      Urea Nitrogen 17.8      Creatinine 1.00 (*)     Calcium 10.1      Glucose 102 (*)     GFR Estimate 60 (*)    MAGNESIUM - Normal    Magnesium 2.1     CBC WITH PLATELETS AND DIFFERENTIAL    WBC Count 7.2      RBC Count 4.17      Hemoglobin 12.7      Hematocrit 39.8      MCV 95      MCH 30.5      MCHC 31.9      RDW 13.2      Platelet Count 246      % Neutrophils 63      % Lymphocytes 27      % Monocytes 8      % Eosinophils 1      % Basophils 0      % Immature Granulocytes 1      NRBCs per 100 WBC 0      Absolute Neutrophils 4.5      Absolute Lymphocytes 1.9      Absolute Monocytes 0.6      Absolute Eosinophils 0.1      Absolute Basophils 0.0      Absolute Immature Granulocytes 0.0      Absolute NRBCs 0.0          Emergency Department Course & Assessments:     Assessments:  1610 Initial Examination  1806 Recheck and discussed results    Independent Interpretation (X-rays, CTs, rhythm strip):  None    Consultations/Discussion of Management or Tests:  None      Social Determinants of Health affecting care:   None    Disposition:  The patient was discharged to home.     Impression & Plan    Medical Decision Making:  Nanci Vergara is a 68 year old female who presents for bilateral hand and left hip cramping after curling her hair today.  She is hypertensive at 143/86.  Vitals otherwise appropriate.  Physical examination reveals no neurological deficits.  Differential includes electrolyte imbalance, medication change, DVT.  Labs are reassuring today.  Electrolytes are grossly normal.  Calcium is within normal limits.  Creatinine is mildly elevated at 1.0 today.  Bilateral lower extremity ultrasound shows no evidence of DVT.  Results reviewed  and discussed with patient.  No clear etiology for patient's symptoms.  Slight elevation in creatinine today could reflect dehydration but no evidence of PINA.  Her exam is otherwise reassuring.  I recommend she follow-up with her primary care provider for further testing and BP check.  Supportive cares and return precautions to the ED were discussed and patient was ready for discharge.    Diagnosis:    ICD-10-CM    1. Muscle cramps  R25.2       2. Elevated blood pressure reading  R03.0          Discharge Medications:  Discharge Medication List as of 8/5/2023  6:13 PM        START taking these medications    Details   cyclobenzaprine (FLEXERIL) 10 MG tablet Take 1 tablet (10 mg) by mouth 3 times daily as needed for muscle spasms, Disp-20 tablet, R-0, E-Prescribe            Scribe Disclosure:  I, Gene Magallon, am serving as a scribe at 6:07 PM on 8/5/2023 to document services personally performed by Jen Tran PA-C based on my observations and the provider's statements to me.     8/5/2023   Jen Tran PA-C Steinbrueck, Emily, PA-C  08/05/23 8730

## 2023-08-10 DIAGNOSIS — J45.990 EXERCISE-INDUCED ASTHMA: ICD-10-CM

## 2023-08-11 RX ORDER — ALBUTEROL SULFATE 90 UG/1
AEROSOL, METERED RESPIRATORY (INHALATION)
Qty: 18 G | Refills: 0 | Status: SHIPPED | OUTPATIENT
Start: 2023-08-11 | End: 2023-08-21

## 2023-08-15 DIAGNOSIS — J45.990 EXERCISE-INDUCED ASTHMA: ICD-10-CM

## 2023-08-15 RX ORDER — ALBUTEROL SULFATE 90 UG/1
AEROSOL, METERED RESPIRATORY (INHALATION)
Qty: 18 G | Refills: 11 | OUTPATIENT
Start: 2023-08-15

## 2023-08-18 ENCOUNTER — OFFICE VISIT (OUTPATIENT)
Dept: INTERNAL MEDICINE | Facility: CLINIC | Age: 68
End: 2023-08-18
Payer: COMMERCIAL

## 2023-08-18 VITALS
HEART RATE: 77 BPM | RESPIRATION RATE: 16 BRPM | SYSTOLIC BLOOD PRESSURE: 145 MMHG | TEMPERATURE: 97.1 F | DIASTOLIC BLOOD PRESSURE: 81 MMHG | OXYGEN SATURATION: 99 % | HEIGHT: 63 IN | WEIGHT: 200.7 LBS | BODY MASS INDEX: 35.56 KG/M2

## 2023-08-18 DIAGNOSIS — E55.9 VITAMIN D DEFICIENCY: ICD-10-CM

## 2023-08-18 DIAGNOSIS — E04.1 THYROID NODULE: ICD-10-CM

## 2023-08-18 DIAGNOSIS — M85.89 OSTEOPENIA OF MULTIPLE SITES: ICD-10-CM

## 2023-08-18 DIAGNOSIS — Z00.00 ENCOUNTER FOR MEDICARE ANNUAL WELLNESS EXAM: Primary | ICD-10-CM

## 2023-08-18 DIAGNOSIS — I10 BENIGN ESSENTIAL HYPERTENSION: ICD-10-CM

## 2023-08-18 DIAGNOSIS — E66.01 MORBID OBESITY (H): ICD-10-CM

## 2023-08-18 DIAGNOSIS — E78.5 HYPERLIPIDEMIA WITH TARGET LDL LESS THAN 130: ICD-10-CM

## 2023-08-18 LAB
ANION GAP SERPL CALCULATED.3IONS-SCNC: 10 MMOL/L (ref 7–15)
BUN SERPL-MCNC: 13.5 MG/DL (ref 8–23)
CALCIUM SERPL-MCNC: 9.5 MG/DL (ref 8.8–10.2)
CHLORIDE SERPL-SCNC: 102 MMOL/L (ref 98–107)
CHOLEST SERPL-MCNC: 213 MG/DL
CREAT SERPL-MCNC: 0.84 MG/DL (ref 0.51–0.95)
DEPRECATED HCO3 PLAS-SCNC: 28 MMOL/L (ref 22–29)
GFR SERPL CREATININE-BSD FRML MDRD: 74 ML/MIN/1.73M2
GLUCOSE SERPL-MCNC: 94 MG/DL (ref 70–99)
HDLC SERPL-MCNC: 60 MG/DL
LDLC SERPL CALC-MCNC: 135 MG/DL
NONHDLC SERPL-MCNC: 153 MG/DL
POTASSIUM SERPL-SCNC: 4.2 MMOL/L (ref 3.4–5.3)
SODIUM SERPL-SCNC: 140 MMOL/L (ref 136–145)
TRIGL SERPL-MCNC: 89 MG/DL
TSH SERPL DL<=0.005 MIU/L-ACNC: 1.63 UIU/ML (ref 0.3–4.2)

## 2023-08-18 PROCEDURE — G0439 PPPS, SUBSEQ VISIT: HCPCS | Performed by: INTERNAL MEDICINE

## 2023-08-18 PROCEDURE — 80061 LIPID PANEL: CPT | Performed by: INTERNAL MEDICINE

## 2023-08-18 PROCEDURE — 84443 ASSAY THYROID STIM HORMONE: CPT | Performed by: INTERNAL MEDICINE

## 2023-08-18 PROCEDURE — 36415 COLL VENOUS BLD VENIPUNCTURE: CPT | Performed by: INTERNAL MEDICINE

## 2023-08-18 PROCEDURE — 99214 OFFICE O/P EST MOD 30 MIN: CPT | Mod: 25 | Performed by: INTERNAL MEDICINE

## 2023-08-18 PROCEDURE — 80048 BASIC METABOLIC PNL TOTAL CA: CPT | Performed by: INTERNAL MEDICINE

## 2023-08-18 PROCEDURE — 82306 VITAMIN D 25 HYDROXY: CPT | Performed by: INTERNAL MEDICINE

## 2023-08-18 RX ORDER — HYDROCHLOROTHIAZIDE 25 MG/1
25 TABLET ORAL DAILY
Qty: 90 TABLET | Refills: 0 | Status: SHIPPED | OUTPATIENT
Start: 2023-08-18 | End: 2023-11-14

## 2023-08-18 ASSESSMENT — ENCOUNTER SYMPTOMS
MYALGIAS: 1
BREAST MASS: 0
ABDOMINAL PAIN: 1
SHORTNESS OF BREATH: 1
COUGH: 1
JOINT SWELLING: 1
NERVOUS/ANXIOUS: 1
ARTHRALGIAS: 1

## 2023-08-18 ASSESSMENT — ACTIVITIES OF DAILY LIVING (ADL): CURRENT_FUNCTION: NO ASSISTANCE NEEDED

## 2023-08-18 ASSESSMENT — ASTHMA QUESTIONNAIRES: ACT_TOTALSCORE: 19

## 2023-08-18 NOTE — PROGRESS NOTES
"SUBJECTIVE:   Nanci is a 68 year old who presents for Preventive Visit.      Are you in the first 12 months of your Medicare coverage?  No    Healthy Habits:     In general, how would you rate your overall health?  Good    Frequency of exercise:  1 day/week    Duration of exercise:  15-30 minutes    Do you usually eat at least 4 servings of fruit and vegetables a day, include whole grains    & fiber and avoid regularly eating high fat or \"junk\" foods?  No    Taking medications regularly:  Yes    Medication side effects:  None    Ability to successfully perform activities of daily living:  No assistance needed    Home Safety:  No safety concerns identified    Hearing Impairment:  No hearing concerns    In the past 6 months, have you been bothered by leaking of urine?  No    In general, how would you rate your overall mental or emotional health?  Good    Additional concerns today:  Yes    Have you ever done Advance Care Planning? (For example, a Health Directive, POLST, or a discussion with a medical provider or your loved ones about your wishes): No, advance care planning information given to patient to review.  Patient plans to discuss their wishes with loved ones or provider.         Fall risk  Fallen 2 or more times in the past year?: No  Any fall with injury in the past year?: No    Cognitive Screening   1) Repeat 3 items (Leader, Season, Table)  \    2) Clock draw: NORMAL  3) 3 item recall: Recalls 3 objects  Results: 3 items recalled: COGNITIVE IMPAIRMENT LESS LIKELY    Mini-CogTM Copyright [employer]. Licensed by the author for use in [facility]; reprinted with permission (vinicio@.[email or web address]). All rights reserved.      Do you have sleep apnea, excessive snoring or daytime drowsiness? : no    Reviewed and updated as needed this visit by clinical staff   Tobacco  Allergies  Meds  Problems             Reviewed and updated as needed this visit by Provider    Allergies  Meds  Problems            Social " History     Tobacco Use    Smoking status: Never    Smokeless tobacco: Never   Substance Use Topics    Alcohol use: No           8/18/2023     3:18 PM   Alcohol Use   Prescreen: >3 drinks/day or >7 drinks/week? No     Do you have a current opioid prescription? No  Do you use any other controlled substances or medications that are not prescribed by a provider? None    Current Outpatient Medications   Medication Sig Dispense Refill    Cholecalciferol (VITAMIN D) 2000 UNITS tablet Take 2,000 Units by mouth daily 100 tablet 11    hydrochlorothiazide (HYDRODIURIL) 25 MG tablet Take 1 tablet (25 mg) by mouth daily 90 tablet 0    albuterol (PROAIR HFA/PROVENTIL HFA/VENTOLIN HFA) 108 (90 Base) MCG/ACT inhaler INHALE 2 PUFFS INTO THE LUNGS 2 TIMES DAILY AND EVERY 6 HRS AS NEEDED 18 g 11              Current providers sharing in care for this patient include:   Patient Care Team:  Everette Way MD as PCP - General (Internal Medicine)  Everette Way MD as Assigned PCP    The following health maintenance items are reviewed in Epic and correct as of today:  Health Maintenance   Topic Date Due    ZOSTER IMMUNIZATION (1 of 2) Never done    ASTHMA ACTION PLAN  04/08/2022    COVID-19 Vaccine (5 - Moderna series) 08/20/2022    DTAP/TDAP/TD IMMUNIZATION (2 - Td or Tdap) 05/24/2023    INFLUENZA VACCINE (1) 09/01/2023    ASTHMA CONTROL TEST  02/18/2024    COLORECTAL CANCER SCREENING  02/18/2024    MEDICARE ANNUAL WELLNESS VISIT  08/18/2024    LIPID  08/18/2024    ANNUAL REVIEW OF HM ORDERS  08/18/2024    FALL RISK ASSESSMENT  08/18/2024    GLUCOSE  08/18/2024    MAMMO SCREENING  07/17/2025    ADVANCE CARE PLANNING  08/18/2028    DEXA  07/17/2038    HEPATITIS C SCREENING  Completed    PHQ-2 (once per calendar year)  Completed    Pneumococcal Vaccine: 65+ Years  Completed    IPV IMMUNIZATION  Aged Out    MENINGITIS IMMUNIZATION  Aged Out     Labs reviewed in EPIC          Review of Systems   Eyes:  Positive for visual  "disturbance.   Respiratory:  Positive for cough and shortness of breath.    Cardiovascular:  Positive for chest pain and peripheral edema.   Gastrointestinal:  Positive for abdominal pain.   Breasts:  Negative for tenderness, breast mass and discharge.   Genitourinary:  Negative for pelvic pain, vaginal bleeding and vaginal discharge.   Musculoskeletal:  Positive for arthralgias, joint swelling and myalgias.   Psychiatric/Behavioral:  The patient is nervous/anxious.          OBJECTIVE:   BP (!) 145/81   Pulse 77   Temp 97.1  F (36.2  C) (Temporal)   Resp 16   Ht 1.6 m (5' 3\")   Wt 91 kg (200 lb 11.2 oz)   LMP 10/29/1998   SpO2 99%   BMI 35.55 kg/m   Estimated body mass index is 35.55 kg/m  as calculated from the following:    Height as of this encounter: 1.6 m (5' 3\").    Weight as of this encounter: 91 kg (200 lb 11.2 oz).  Physical Exam      Diagnostic Test Results:  Results for orders placed or performed in visit on 08/18/23   Lipid panel reflex to direct LDL Non-fasting     Status: Abnormal   Result Value Ref Range    Cholesterol 213 (H) <200 mg/dL    Triglycerides 89 <150 mg/dL    Direct Measure HDL 60 >=50 mg/dL    LDL Cholesterol Calculated 135 (H) <=100 mg/dL    Non HDL Cholesterol 153 (H) <130 mg/dL    Narrative    Cholesterol  Desirable:  <200 mg/dL    Triglycerides  Normal:  Less than 150 mg/dL  Borderline High:  150-199 mg/dL  High:  200-499 mg/dL  Very High:  Greater than or equal to 500 mg/dL    Direct Measure HDL  Female:  Greater than or equal to 50 mg/dL   Male:  Greater than or equal to 40 mg/dL    LDL Cholesterol  Desirable:  <100mg/dL  Above Desirable:  100-129 mg/dL   Borderline High:  130-159 mg/dL   High:  160-189 mg/dL   Very High:  >= 190 mg/dL    Non HDL Cholesterol  Desirable:  130 mg/dL  Above Desirable:  130-159 mg/dL  Borderline High:  160-189 mg/dL  High:  190-219 mg/dL  Very High:  Greater than or equal to 220 mg/dL   Basic metabolic panel  (Ca, Cl, CO2, Creat, Gluc, K, Na, " BUN)     Status: Normal   Result Value Ref Range    Sodium 140 136 - 145 mmol/L    Potassium 4.2 3.4 - 5.3 mmol/L    Chloride 102 98 - 107 mmol/L    Carbon Dioxide (CO2) 28 22 - 29 mmol/L    Anion Gap 10 7 - 15 mmol/L    Urea Nitrogen 13.5 8.0 - 23.0 mg/dL    Creatinine 0.84 0.51 - 0.95 mg/dL    Calcium 9.5 8.8 - 10.2 mg/dL    Glucose 94 70 - 99 mg/dL    GFR Estimate 74 >60 mL/min/1.73m2   TSH with free T4 reflex     Status: Normal   Result Value Ref Range    TSH 1.63 0.30 - 4.20 uIU/mL   Vitamin D Deficiency     Status: Abnormal   Result Value Ref Range    Vitamin D, Total (25-Hydroxy) 17 (L) 20 - 75 ug/L    Narrative    Season, race, dietary intake, and treatment affect the concentration of 25-hydroxy-Vitamin D. Values may decrease during winter months and increase during summer months. Values 20-29 ug/L may indicate Vitamin D insufficiency and values <20 ug/L may indicate Vitamin D deficiency.    Vitamin D determination is routinely performed by an immunoassay specific for 25 hydroxyvitamin D3.  If an individual is on vitamin D2(ergocalciferol) supplementation, please specify 25 OH vitamin D2 and D3 level determination by LCMSMS test VITD23.         ASSESSMENT / PLAN:       ICD-10-CM    1. Encounter for Medicare annual wellness exam  Z00.00       2. Hyperlipidemia with target LDL less than 130  E78.5 Lipid panel reflex to direct LDL Non-fasting     Lipid panel reflex to direct LDL Non-fasting      3. Osteopenia of multiple sites  M85.89 Vitamin D Deficiency     Vitamin D Deficiency      4. Thyroid nodule  E04.1 TSH with free T4 reflex     US Thyroid     TSH with free T4 reflex      5. Benign essential hypertension  I10 Basic metabolic panel  (Ca, Cl, CO2, Creat, Gluc, K, Na, BUN)     Basic metabolic panel  (Ca, Cl, CO2, Creat, Gluc, K, Na, BUN)     hydrochlorothiazide (HYDRODIURIL) 25 MG tablet     Basic metabolic panel  (Ca, Cl, CO2, Creat, Gluc, K, Na, BUN)      6. Morbid obesity (H)  E66.01         -Updated  screening, immunizations, prevention.  Please see health maintenance list, care gaps  - focus on BP. Start hydrochlorothiazide. F/u labs 1 mo then visit this fall. Check home #  -consider primary prevention with statin given elev CV risk  -start vit D given osteopenia and low vit d level  -recheck thyroid u/s per last u/s recs  - weight loss    Patient has been advised of split billing requirements and indicates understanding: Yes      COUNSELING:  Reviewed preventive health counseling, as reflected in patient instructions       Regular exercise       Healthy diet/nutrition       Bladder control       Fall risk prevention       Osteoporosis prevention/bone health         She reports that she has never smoked. She has never used smokeless tobacco.      Appropriate preventive services were discussed with this patient, including applicable screening as appropriate for cardiovascular disease, diabetes, osteopenia/osteoporosis, and glaucoma.  As appropriate for age/gender, discussed screening for colorectal cancer, prostate cancer, breast cancer, and cervical cancer. Checklist reviewing preventive services available has been given to the patient.    Reviewed patients plan of care and provided an AVS. The Basic Care Plan (routine screening as documented in Health Maintenance) for Nanci meets the Care Plan requirement. This Care Plan has been established and reviewed with the Patient.          Everette Way MD  [facility]    Identified Health Risks:  I have reviewed Opioid Use Disorder and Substance Use Disorder risk factors and made any needed referrals. She is at risk for lack of exercise and has been provided with information to increase physical activity for the benefit of her well-being.  The patient was counseled and encouraged to consider modifying their diet and eating habits. She was provided with information on recommended healthy diet options.

## 2023-08-18 NOTE — PATIENT INSTRUCTIONS
"Patient Education   Personalized Prevention Plan  You are due for the preventive services outlined below.  Your care team is available to assist you in scheduling these services.  If you have already completed any of these items, please share that information with your care team to update in your medical record.  Health Maintenance Due   Topic Date Due     Zoster (Shingles) Vaccine (1 of 2) Never done     Asthma Action Plan - yearly  04/08/2022     COVID-19 Vaccine (5 - Moderna series) 08/20/2022     ANNUAL REVIEW OF HM ORDERS  10/15/2022     Diptheria Tetanus Pertussis (DTAP/TDAP/TD) Vaccine (2 - Td or Tdap) 05/24/2023     Cholesterol Lab  08/09/2023     Learning About Being Physically Active  What is physical activity?     Being physically active means doing any kind of activity that gets your body moving.  The types of physical activity that can help you get fit and stay healthy include:  Aerobic or \"cardio\" activities. These make your heart beat faster and make you breathe harder, such as brisk walking, riding a bike, or running. They strengthen your heart and lungs and build up your endurance.  Strength training activities. These make your muscles work against, or \"resist,\" something. Examples include lifting weights or doing push-ups. These activities help tone and strengthen your muscles and bones.  Stretches. These let you move your joints and muscles through their full range of motion. Stretching helps you be more flexible.  Reaching a balance between these three types of physical activity is important because each one contributes to your overall fitness.  What are the benefits of being active?  Being active is one of the best things you can do for your health. It helps you to:  Feel stronger and have more energy to do all the things you like to do.  Focus better at school or work.  Feel, think, and sleep better.  Reach and stay at a healthy weight.  Lose fat and build lean muscle.  Lower your risk for " "serious health problems, including diabetes, heart attack, high blood pressure, and some cancers.  Keep your heart, lungs, bones, muscles, and joints strong and healthy.  How can you make being active part of your life?  Start slowly. Make it your long-term goal to get at least 30 minutes of exercise on most days of the week. Walking is a good choice. You also may want to do other activities, such as running, swimming, cycling, or playing tennis or team sports.  Pick activities that you like--ones that make your heart beat faster, your muscles stronger, and your muscles and joints more flexible. If you find more than one thing you like doing, do them all. You don't have to do the same thing every day.  Get your heart pumping every day. Any activity that makes your heart beat faster and keeps it at that rate for a while counts.  Here are some great ways to get your heart beating faster:  Go for a brisk walk, run, or bike ride.  Go for a hike or swim.  Go in-line skating.  Play a game of touch football, basketball, or soccer.  Ride a bike.  Play tennis or racquetball.  Climb stairs.  Even some household chores can be aerobic--just do them at a faster pace. Vacuuming, raking or mowing the lawn, sweeping the garage, and washing and waxing the car all can help get your heart rate up.  Strengthen your muscles during the week. You don't have to lift heavy weights or grow big, bulky muscles to get stronger. Doing a few simple activities that make your muscles work against, or \"resist,\" something can help you get stronger.  For example, you can:  Do push-ups or sit-ups, which use your own body weight as resistance.  Lift weights or dumbbells or use stretch bands at home or in a gym or community center.  Stretch your muscles often. Stretching will help you as you become more active. It can help you stay flexible, loosen tight muscles, and avoid injury. It can also help improve your balance and posture and can be a great way " "to relax.  Be sure to stretch the muscles you'll be using when you work out. It's best to warm your muscles slightly before you stretch them. Walk or do some other light aerobic activity for a few minutes, and then start stretching.  When you stretch your muscles:  Do it slowly. Stretching is not about going fast or making sudden movements.  Don't push or bounce during a stretch.  Hold each stretch for at least 15 to 30 seconds, if you can. You should feel a stretch in the muscle, but not pain.  Breathe out as you do the stretch. Then breathe in as you hold the stretch. Don't hold your breath.  If you're worried about how more activity might affect your health, have a checkup before you start. Follow any special advice your doctor gives you for getting a smart start.  Where can you learn more?  Go to https://www.[employer]/patiented  Enter W332 in the search box to learn more about \"Learning About Being Physically Active.\"  Current as of: October 10, 2022               Content Version: 13.7    4890-2225 [employer].   Care instructions adapted under license by your healthcare professional. If you have questions about a medical condition or this instruction, always ask your healthcare professional. [employer] disclaims any warranty or liability for your use of this information.      Learning About Dietary Guidelines  What are the Dietary Guidelines for Americans?     Dietary Guidelines for Americans provide tips for eating well and staying healthy. This helps reduce the risk for long-term (chronic) diseases.  These guidelines recommend that you:  Eat and drink the right amount for you. The U.S. government's food guide is called MyPlate. It can help you make your own well-balanced eating plan.  Try to balance your eating with your activity. This helps you stay at a healthy weight.  Drink alcohol in moderation, if at all.  Limit foods high in salt, saturated fat, trans fat, and added " "sugar.  These guidelines are from the U.S. Department of Agriculture and the U.S. Department of Health and Human Services. They are updated every 5 years.  What is MyPlate?  MyPlate is the U.S. government's food guide. It can help you make your own well-balanced eating plan. A balanced eating plan means that you eat enough, but not too much, and that your food gives you the nutrients you need to stay healthy.  MyPlate focuses on eating plenty of whole grains, fruits, and vegetables, and on limiting fat and sugar. It is available online at www.ChooseMyPlate.gov.  How can you get started?  If you're trying to eat healthier, you can slowly change your eating habits over time. You don't have to make big changes all at once. Start by adding one or two healthy foods to your meals each day.  Grains  Choose whole-grain breads and cereals and whole-wheat pasta and whole-grain crackers.  Vegetables  Eat a variety of vegetables every day. They have lots of nutrients and are part of a heart-healthy diet.  Fruits  Eat a variety of fruits every day. Fruits contain lots of nutrients. Choose fresh fruit instead of fruit juice.  Protein foods  Choose fish and lean poultry more often. Eat red meat and fried meats less often. Dried beans, tofu, and nuts are also good sources of protein.  Dairy  Choose low-fat or fat-free products from this food group. If you have problems digesting milk, try eating cheese or yogurt instead.  Fats and oils  Limit fats and oils if you're trying to cut calories. Choose healthy fats when you cook. These include canola oil and olive oil.  Where can you learn more?  Go to https://www.[employer].net/patiented  Enter D676 in the search box to learn more about \"Learning About Dietary Guidelines.\"  Current as of: March 1, 2023               Content Version: 13.7    8667-6376 [employer].   Care instructions adapted under license by your healthcare professional. If you have questions about a medical " condition or this instruction, always ask your healthcare professional. [employer] disclaims any warranty or liability for your use of this information.

## 2023-08-19 LAB — DEPRECATED CALCIDIOL+CALCIFEROL SERPL-MC: 17 UG/L (ref 20–75)

## 2023-08-21 DIAGNOSIS — J45.990 EXERCISE-INDUCED ASTHMA: ICD-10-CM

## 2023-08-21 RX ORDER — ALBUTEROL SULFATE 90 UG/1
AEROSOL, METERED RESPIRATORY (INHALATION)
Qty: 18 G | Refills: 11 | Status: SHIPPED | OUTPATIENT
Start: 2023-08-21

## 2023-08-23 PROBLEM — M85.89 OSTEOPENIA OF MULTIPLE SITES: Status: ACTIVE | Noted: 2023-08-23

## 2023-08-23 PROBLEM — I10 BENIGN ESSENTIAL HYPERTENSION: Status: ACTIVE | Noted: 2023-08-23

## 2023-08-23 PROBLEM — E04.1 THYROID NODULE: Status: ACTIVE | Noted: 2023-08-23

## 2023-08-23 RX ORDER — ERGOCALCIFEROL 1.25 MG/1
50000 CAPSULE, LIQUID FILLED ORAL WEEKLY
Qty: 8 CAPSULE | Refills: 0 | Status: SHIPPED | OUTPATIENT
Start: 2023-08-23 | End: 2023-10-12

## 2023-08-28 ENCOUNTER — TELEPHONE (OUTPATIENT)
Dept: INTERNAL MEDICINE | Facility: CLINIC | Age: 68
End: 2023-08-28
Payer: COMMERCIAL

## 2023-08-28 NOTE — LETTER
August 29, 2023    Nanci Vergara  [address] [place] [id number]        Dear Nanci,     We are writing you to discuss your recent lab results. Here is what Dr. Gregg says about your results.   Thyroid & kidney fine,   cholesterol  up some  Rec:  Get BP down with hydrochlorothiazide - as discussed labs in 1 mo and then f/u this fall   We can discuss lipids at that f/u. Might be beneficial to start cholesterol med but lets wait until BP better.  Until then use diet like Mediterranean diet      Taking care of your health is important to us and we look forward to seeing you in the near future.  Please call us at [phone] or [phone] (or use Transit[employer]) to schedule.  Please disregard this notice if you have already made an appointment.        Sincerely,          [facility] Team

## 2023-09-08 ENCOUNTER — TELEPHONE (OUTPATIENT)
Dept: INTERNAL MEDICINE | Facility: CLINIC | Age: 68
End: 2023-09-08
Payer: COMMERCIAL

## 2023-09-08 NOTE — TELEPHONE ENCOUNTER
Patient called and stated that she accidentally missed that she was suppose to take the Vitamin D 50,000 units weekly instead of daily. Patient has taken the medication daily for the past 7 days. Patient denied having any symptoms during the call. Patient is wondering what she should do?    Precious GREEN RN  [facility] Triage Team

## 2023-09-12 NOTE — TELEPHONE ENCOUNTER
Patient Contact    Attempt # 1    Was call answered?  Yes.      Relayed provider message. Pt verbalizes understanding and agrees to plan of care.

## 2023-11-20 ENCOUNTER — NURSE TRIAGE (OUTPATIENT)
Dept: INTERNAL MEDICINE | Facility: CLINIC | Age: 68
End: 2023-11-20
Payer: COMMERCIAL

## 2023-11-20 NOTE — TELEPHONE ENCOUNTER
"Reason for Disposition    MODERATE pain (e.g., interferes with normal activities, limping) and present > 3 days    Additional Information    Negative: Followed an injury    Negative: Wound looks infected    Negative: Caused by an animal bite    Negative: Caused by frostbite    Negative: Athlete's Foot suspected (i.e., itchy red rash in web space between toes)    Negative: Foot pain is main symptom    Negative: Foot is cool or blue in comparison to other foot    Negative: Purple or black skin on toe  (Exception: Person remembers bruising the toe from an injury.)    Negative: Patient sounds very sick or weak to the triager    Negative: SEVERE pain (e.g., excruciating, unable to do any normal activities) and not improved after 2 hours of pain medicine    Negative: Looks like a boil, infected sore, deep ulcer, or other infected rash (spreading redness, pus)    Negative: Yellow pus seen in skin around toenail (cuticle area), or pus seen under toenail    Negative: Numbness in one foot (i.e., loss of sensation)    Answer Assessment - Initial Assessment Questions  1. ONSET: \"When did the pain start?\"       Pain in toes yesterday. Happens sporadically.   2. LOCATION: \"Where is the pain located?\"   (e.g., around nail, entire toe, at foot joint)       Toes and feet. States that at times feels in fingers and legs.   3. PAIN: \"How bad is the pain?\"    (Scale 1-10; or mild, moderate, severe)    -  MILD (1-3): doesn't interfere with normal activities     -  MODERATE (4-7): interferes with normal activities (e.g., work or school) or awakens from sleep, limping     -  SEVERE (8-10): excruciating pain, unable to do any normal activities, unable to walk      Moderate.   4. APPEARANCE: \"What does the toe look like?\" (e.g., redness, swelling, bruising, pallor)      Toes look normal. A little swelling in ankles yesterday. Swelling is better today.   5. CAUSE: \"What do you think is causing the toe pain?\"      Muscle cramps/pain in feet " "due to water. States that she did have some swelling. Did go into ER for cramping about 3 months ago and was informed that it was due to dehydration.   6. OTHER SYMPTOMS: \"Do you have any other symptoms?\" (e.g., leg pain, rash, fever, numbness)     Slight headache  7. PREGNANCY: \"Is there any chance you are pregnant?\" \"When was your last menstrual period?\"      NA    Protocols used: Toe Pain-A-OH    "

## 2023-11-21 ENCOUNTER — VIRTUAL VISIT (OUTPATIENT)
Dept: INTERNAL MEDICINE | Facility: CLINIC | Age: 68
End: 2023-11-21
Payer: COMMERCIAL

## 2023-11-21 DIAGNOSIS — R25.2 FOOT CRAMPS: Primary | ICD-10-CM

## 2023-11-21 DIAGNOSIS — I10 BENIGN ESSENTIAL HYPERTENSION: ICD-10-CM

## 2023-11-21 PROCEDURE — 99213 OFFICE O/P EST LOW 20 MIN: CPT | Mod: GT | Performed by: INTERNAL MEDICINE

## 2023-11-21 NOTE — PROGRESS NOTES
"Nanci is a 68 year old who is being evaluated via a billable video visit.      How would you like to obtain your AVS? MyChart  If the video visit is dropped, the invitation should be resent by: Text to cell phone: [phone]  Will anyone else be joining your video visit? No          Assessment & Plan     Foot cramps  - Magnesium; Future    Benign essential hypertension  Cont hydrochlorothiazide- check home #  - Magnesium; Future  - Basic metabolic panel  (Ca, Cl, CO2, Creat, Gluc, K, Na, BUN); Future             BMI:   Estimated body mass index is 35.55 kg/m  as calculated from the following:    Height as of 8/18/23: 1.6 m (5' 3\").    Weight as of 8/18/23: 91 kg (200 lb 11.2 oz).           Everette Way MD  [facility]    Subjective   Nanci is a 68 year old, presenting for the following health issues:  Numbness      HPI     Concern - pain/numbness in feet and toes  Onset: intermittent  Description: swelling, pain, numbness in toes  Intensity: moderate  Progression of Symptoms:  intermittent  Accompanying Signs & Symptoms: no visible bruising, no trauma to area    Therapies tried and outcome: None    Hypertension Follow-up    Do you check your blood pressure regularly outside of the clinic? Yes   Are you following a low salt diet? Yes  Are your blood pressures ever more than 140 on the top number (systolic) OR more   than 90 on the bottom number (diastolic), for example 140/90? Yes    Review of Systems         Objective           Vitals:  No vitals were obtained today due to virtual visit.    Physical Exam      GENERAL: Healthy, alert and no distress  EYES: Eyes grossly normal to inspection.  No discharge or erythema, or obvious scleral/conjunctival abnormalities.  RESP: No audible wheeze, cough, or visible cyanosis.  No visible retractions or increased work of breathing.    SKIN: Visible skin clear. No significant rash, abnormal pigmentation or lesions.  NEURO: Cranial nerves " grossly intact.  Mentation and speech appropriate for age.  PSYCH: Mentation appears normal, affect normal/bright, judgement and insight intact, normal speech and appearance well-groomed.    No results found for any visits on 11/21/23.             Video-Visit Details    Type of service:  Video Visit   Video Start Time:  1146  Video End Time:12:13 PM    Originating Location (pt. Location): Home    Distant Location (provider location):  On-site  Platform used for Video Visit: Baldo

## 2023-12-01 ENCOUNTER — TELEPHONE (OUTPATIENT)
Dept: INTERNAL MEDICINE | Facility: CLINIC | Age: 68
End: 2023-12-01
Payer: COMMERCIAL

## 2023-12-01 NOTE — TELEPHONE ENCOUNTER
Patient asking if she needs to be fasting for labs to be done on 12/4. BMP and magnesium order for hypertension follow up.  Patient had fasting labs in August.  Advised patient that she does not need to fast for lab draw at 2 PM on Monday 12/4. Julia Palafox RN

## 2023-12-04 ENCOUNTER — LAB (OUTPATIENT)
Dept: LAB | Facility: CLINIC | Age: 68
End: 2023-12-04
Payer: COMMERCIAL

## 2023-12-04 DIAGNOSIS — I10 BENIGN ESSENTIAL HYPERTENSION: ICD-10-CM

## 2023-12-04 DIAGNOSIS — R25.2 FOOT CRAMPS: ICD-10-CM

## 2023-12-04 LAB
ANION GAP SERPL CALCULATED.3IONS-SCNC: 10 MMOL/L (ref 7–15)
BUN SERPL-MCNC: 15 MG/DL (ref 8–23)
CALCIUM SERPL-MCNC: 10.2 MG/DL (ref 8.8–10.2)
CHLORIDE SERPL-SCNC: 98 MMOL/L (ref 98–107)
CREAT SERPL-MCNC: 0.88 MG/DL (ref 0.51–0.95)
DEPRECATED HCO3 PLAS-SCNC: 31 MMOL/L (ref 22–29)
EGFRCR SERPLBLD CKD-EPI 2021: 70 ML/MIN/1.73M2
GLUCOSE SERPL-MCNC: 95 MG/DL (ref 70–99)
MAGNESIUM SERPL-MCNC: 2.2 MG/DL (ref 1.7–2.3)
POTASSIUM SERPL-SCNC: 3.8 MMOL/L (ref 3.4–5.3)
SODIUM SERPL-SCNC: 139 MMOL/L (ref 135–145)

## 2023-12-04 PROCEDURE — 83735 ASSAY OF MAGNESIUM: CPT

## 2023-12-04 PROCEDURE — 36415 COLL VENOUS BLD VENIPUNCTURE: CPT

## 2023-12-04 PROCEDURE — 80048 BASIC METABOLIC PNL TOTAL CA: CPT

## 2023-12-19 ENCOUNTER — NURSE TRIAGE (OUTPATIENT)
Dept: INTERNAL MEDICINE | Facility: CLINIC | Age: 68
End: 2023-12-19
Payer: COMMERCIAL

## 2023-12-19 DIAGNOSIS — I10 BENIGN ESSENTIAL HYPERTENSION: ICD-10-CM

## 2023-12-19 RX ORDER — HYDROCHLOROTHIAZIDE 25 MG/1
25 TABLET ORAL DAILY
Qty: 30 TABLET | Refills: 2 | Status: SHIPPED | OUTPATIENT
Start: 2023-12-19 | End: 2024-01-30

## 2023-12-19 NOTE — TELEPHONE ENCOUNTER
"Patient called the clinic and stated that she had been having congestion and mild cough since Thursday. Patient stated that she has not tested for Covid. Advised patient to test for Covid and if it is positive to call the clinic. Patient was given care advise per protocol. Patient agreed with this plan and had no further questions.    Reason for Disposition   Sinus congestion as part of a cold, present < 10 days    Additional Information   Negative: Sounds like a life-threatening emergency to the triager   Negative: Difficulty breathing, and not from stuffy nose (e.g., not relieved by cleaning out the nose)   Negative: SEVERE headache and has fever   Negative: Patient sounds very sick or weak to the triager   Negative: SEVERE sinus pain   Negative: SEVERE headache   Negative: Redness or swelling on the cheek, forehead, or around the eye   Negative: Fever > 103 F (39.4 C)   Negative: Fever > 101 F (38.3 C) and over 60 years of age   Negative: Fever > 100.0 F (37.8 C) and has diabetes mellitus or a weak immune system (e.g., HIV positive, cancer chemotherapy, organ transplant, splenectomy, chronic steroids)   Negative: Fever > 100.0 F (37.8 C) and bedridden (e.g., CVA, chronic illness, recovering from surgery)   Negative: Fever present > 3 days (72 hours)   Negative: Fever returns after gone for over 24 hours and symptoms worse or not improved   Negative: Sinus pain (not just congestion) and fever   Negative: Earache   Negative: Sinus congestion (pressure, fullness) present > 10 days   Negative: Nasal discharge present > 10 days   Negative: Using nasal washes and pain medicine > 24 hours and sinus pain (lower forehead, cheekbone, or eye) persists   Negative: Lots of coughing   Negative: Patient wants to be seen    Answer Assessment - Initial Assessment Questions  1. LOCATION: \"Where does it hurt?\"         Under the eyes yesterday but not today (mostly congestion)    2. ONSET: \"When did the sinus pain start?\"  (e.g., " "hours, days)         12/14/23    3. SEVERITY: \"How bad is the pain?\"   (Scale 1-10; mild, moderate or severe)    - MILD (1-3): doesn't interfere with normal activities     - MODERATE (4-7): interferes with normal activities (e.g., work or school) or awakens from sleep    - SEVERE (8-10): excruciating pain and patient unable to do any normal activities           0/10    4. RECURRENT SYMPTOM: \"Have you ever had sinus problems before?\" If Yes, ask: \"When was the last time?\" and \"What happened that time?\"         No    5. NASAL CONGESTION: \"Is the nose blocked?\" If Yes, ask: \"Can you open it or must you breathe through your mouth?\"        Can breath out of nose    6. NASAL DISCHARGE: \"Do you have discharge from your nose?\" If so ask, \"What color?\"        Yes, clear    7. FEVER: \"Do you have a fever?\" If Yes, ask: \"What is it, how was it measured, and when did it start?\"         No    8. OTHER SYMPTOMS: \"Do you have any other symptoms?\" (e.g., sore throat, cough, earache, difficulty breathing)        Cough    9. PREGNANCY: \"Is there any chance you are pregnant?\" \"When was your last menstrual period?\"        NO    Protocols used: Sinus Pain or Congestion-JOCELYNN-LIBBY GREEN RN  [facility] Triage Team    "

## 2023-12-19 NOTE — TELEPHONE ENCOUNTER
Patient called the clinic and stated that she will be needing a refill of her medication. Medication and pharmacy is pended. Routing to refill pool.    Precious GREEN RN  [facility] Triage Team

## 2024-01-30 DIAGNOSIS — I10 BENIGN ESSENTIAL HYPERTENSION: ICD-10-CM

## 2024-01-30 RX ORDER — HYDROCHLOROTHIAZIDE 25 MG/1
25 TABLET ORAL DAILY
Qty: 90 TABLET | Refills: 0 | Status: SHIPPED | OUTPATIENT
Start: 2024-01-30 | End: 2024-03-13

## 2024-01-30 NOTE — TELEPHONE ENCOUNTER
Patient calling to request refill for hydrochlorothiazide script be sent to updated pharmacy due to insurance change. Patient did not have any additional questions or concerns.     Script and pharmacy pended. Routing to refill pool for review.

## 2024-01-30 NOTE — TELEPHONE ENCOUNTER
Received a call back from the patient regarding a refill of this medication. Patient states she is currently at the pharmacy needing to  this medication.     Will route HP to PCP for review.     Rachel Smith RN

## 2024-01-30 NOTE — TELEPHONE ENCOUNTER
Routing refill request to provider for review/approval because:  Labs out of range:    BP Readings from Last 3 Encounters:   08/18/23 (!) 145/81   08/05/23 (!) 143/86   08/09/22 132/74      Patient requesting script be sent to new pharmacy due to insurance change. Thank you.     Justice L. Phoenix, RN

## 2024-01-30 NOTE — TELEPHONE ENCOUNTER
Samydled with Dr. Way. Informed patient medication has been refilled but she is due for a follow-up appointment.     Triage RN attempted to assist in getting the patient scheduled for an appointment. Patient states she needs to look at her calendar and she will plan to call the clinic back to get an appointment scheduled.     Rachel Smith RN

## 2024-02-01 NOTE — TELEPHONE ENCOUNTER
Ok to mail results  Thyroid & kidney fine,   cholesterol  up some  Rec:  Get BP down with hydrochlorothiazide - as discussed labs in 1 mo and then f/u this fall   We can discuss lipids at that f/u. Might be beneficial to start cholesterol med but lets wait until BP better.  Until then use diet like Mediterranean diet      
TO  PCP  .   Patient calling for results. Would like more information regarding her cholesterol results. Is this expected for her? What can she do differently? When do you want repeat BMP completed? Was the thyroid test ordered? They haven't called.    Writer did not see order for Thyroid in system.     Elly Demarco RN on 8/28/2023 at 10:17 AM    
never used

## 2024-03-10 ASSESSMENT — ASTHMA QUESTIONNAIRES
QUESTION_2 LAST FOUR WEEKS HOW OFTEN HAVE YOU HAD SHORTNESS OF BREATH: ONCE OR TWICE A WEEK
ACT_TOTALSCORE: 23
QUESTION_1 LAST FOUR WEEKS HOW MUCH OF THE TIME DID YOUR ASTHMA KEEP YOU FROM GETTING AS MUCH DONE AT WORK, SCHOOL OR AT HOME: NONE OF THE TIME
QUESTION_3 LAST FOUR WEEKS HOW OFTEN DID YOUR ASTHMA SYMPTOMS (WHEEZING, COUGHING, SHORTNESS OF BREATH, CHEST TIGHTNESS OR PAIN) WAKE YOU UP AT NIGHT OR EARLIER THAN USUAL IN THE MORNING: NOT AT ALL
QUESTION_5 LAST FOUR WEEKS HOW WOULD YOU RATE YOUR ASTHMA CONTROL: WELL CONTROLLED
ACT_TOTALSCORE: 23
QUESTION_4 LAST FOUR WEEKS HOW OFTEN HAVE YOU USED YOUR RESCUE INHALER OR NEBULIZER MEDICATION (SUCH AS ALBUTEROL): NOT AT ALL

## 2024-03-13 ENCOUNTER — OFFICE VISIT (OUTPATIENT)
Dept: INTERNAL MEDICINE | Facility: CLINIC | Age: 69
End: 2024-03-13
Payer: COMMERCIAL

## 2024-03-13 VITALS
HEIGHT: 63 IN | RESPIRATION RATE: 16 BRPM | OXYGEN SATURATION: 98 % | WEIGHT: 193.8 LBS | HEART RATE: 72 BPM | TEMPERATURE: 98.2 F | BODY MASS INDEX: 34.34 KG/M2 | SYSTOLIC BLOOD PRESSURE: 110 MMHG | DIASTOLIC BLOOD PRESSURE: 64 MMHG

## 2024-03-13 DIAGNOSIS — I10 BENIGN ESSENTIAL HYPERTENSION: Primary | ICD-10-CM

## 2024-03-13 DIAGNOSIS — Z12.11 SCREEN FOR COLON CANCER: ICD-10-CM

## 2024-03-13 DIAGNOSIS — E04.2 MULTINODULAR GOITER (NONTOXIC): ICD-10-CM

## 2024-03-13 PROCEDURE — 99214 OFFICE O/P EST MOD 30 MIN: CPT | Performed by: INTERNAL MEDICINE

## 2024-03-13 RX ORDER — HYDROCHLOROTHIAZIDE 25 MG/1
25 TABLET ORAL DAILY
Qty: 90 TABLET | Refills: 3 | Status: SHIPPED | OUTPATIENT
Start: 2024-03-13

## 2024-03-13 ASSESSMENT — PAIN SCALES - GENERAL: PAINLEVEL: MODERATE PAIN (4)

## 2024-03-13 NOTE — PROGRESS NOTES
"  Assessment & Plan     Benign essential hypertension  Looks very good since starting hydrochlorothiazide.   - hydrochlorothiazide (HYDRODIURIL) 25 MG tablet; Take 1 tablet (25 mg) by mouth daily    Multinodular goiter (nontoxic)  Get repeat u/s as rec'd on initial study  - US Thyroid; Future    Hyperlipidemia  D/w pt options on rx or observation given CV risk approx 10%  Discussed CV CaCT as well- she'll consider it  Screen for colon cancer  - Colonoscopy Screening  Referral; Future    Review of the result(s) of each unique test - labs  Ordering of each unique test  Prescription drug management  I spent a total of 35 minutes on the day of the visit.   Time spent by me doing chart review, history and exam, documentation and further activities per the note      BMI  Estimated body mass index is 34.33 kg/m  as calculated from the following:    Height as of this encounter: 1.6 m (5' 3\").    Weight as of this encounter: 87.9 kg (193 lb 12.8 oz).   Weight management plan: Discussed healthy diet and exercise guidelines      See Patient Instructions    Jeanmarie Christine is a 68 year old, presenting for the following health issues:  Hypertension        3/13/2024     9:59 AM   Additional Questions   Roomed by Yakelin TOLEDO CMA     History of Present Illness       Hypertension: She presents for follow up of hypertension.  She does check blood pressure  regularly outside of the clinic. Outpatient blood pressures have not been over 140/90. She follows a low salt diet.     She eats 2-3 servings of fruits and vegetables daily.She consumes 1 sweetened beverage(s) daily.She exercises with enough effort to increase her heart rate 30 to 60 minutes per day.  She exercises with enough effort to increase her heart rate 4 days per week. She is missing 1 dose(s) of medications per week.  She is not taking prescribed medications regularly due to other.                 Objective    /64   Pulse 72   Temp 98.2  F (36.8  C) " "(Oral)   Resp 16   Ht 1.6 m (5' 3\")   Wt 87.9 kg (193 lb 12.8 oz)   LMP 10/29/1998 (Exact Date)   SpO2 98%   Breastfeeding No   BMI 34.33 kg/m    Body mass index is 34.33 kg/m .  Physical Exam   GENERAL: alert and no distress  NECK: no adenopathy, no asymmetry, masses, or scars, and thyroid nodule /goiter noted- unchanged  RESP: lungs clear to auscultation - no rales, rhonchi or wheezes  CV: regular rate and rhythm, normal S1 S2, no S3 or S4, no murmur, click or rub, no peripheral edema             Signed Electronically by: Everette Way MD      The 10-year ASCVD risk score (Johanna ASIF, et al., 2019) is: 9.6%    Values used to calculate the score:      Age: 68 years      Sex: Female      Is Non- : Yes      Diabetic: No      Tobacco smoker: No      Systolic Blood Pressure: 110 mmHg      Is BP treated: Yes      HDL Cholesterol: 60 mg/dL      Total Cholesterol: 213 mg/dL   "

## 2024-03-13 NOTE — PATIENT INSTRUCTIONS
Online CBT-I    Your health care provider has recommended our online cognitive-behavioral therapy program for insomnia (online CBT-I).  Our online CBT-I program is a collaboration between [facility] and the [facility], developers of the GO! To Sleep program.  The program followsinsomnia  treatment strategies and recommendations  similar to those used in our [facility] Sleep Clinics.     How It works:    Research indicates that online CBT-I can be as effective as in-person therapy.  The Go! to Sleep program uses proven methods to help you improves your sleep from the comfort and privacy of your own home.  A recent study in the journal SLEEP found that 91% of patients who completed a five-week online program for insomnia reported improvement in sleep.    What You Get:    The cost for an entire 6 week program is $40.  For this fee you will have an online guide to learning how to improve your sleep using daily sleep logs that help individualize your program.  You will learn the basic science of sleep and why certain behaviors can be detrimental to your sleep.  You will also be given activities to help you get the sleep you needs including specially crafted audio relaxation practices that will help you manage stress and improve your sleep.    Who its for:     GO! To Sleep was designed for those experiencing short-term  insomnia and long-term insomnia lasting more than six months.  If you use sleep medication on an occasional basis, the program will help you learn techniques that will help you sleep better without medication.      Click on the link below to get started today:                           [email or web address]             Once you are registered you can share your sleep log data with [facility] where your health provider will be able to review your sleep log and progress.  Once you begin the program, go to the left side  "navigation bar and click on the  share sleep log  button:                                        This takes you to the next page where you enter the provider code [email or web address] and click Locate:                 This brings you to the verification page where you should see [facility] identified as your provider                                                                           By clicking \"Submit\" your sleep log data will be sent to our secure [facility] portal for review by you provider.     For Help Contact [facility] At:    [email or web address]     "

## 2024-03-20 ENCOUNTER — TELEPHONE (OUTPATIENT)
Dept: GASTROENTEROLOGY | Facility: CLINIC | Age: 69
End: 2024-03-20
Payer: COMMERCIAL

## 2024-03-20 NOTE — TELEPHONE ENCOUNTER
"Endoscopy Scheduling Screen    Have you had a positive Covid test in the last 14 days?  No    What is your communication preference for Instructions and/or Bowel Prep?   MyChart SEND MYCHART AND LETTER    What insurance is in the chart?  Other:  [facility]    Ordering/Referring Provider: BRAXTON WANG   (If ordering provider performs procedure, schedule with ordering provider unless otherwise instructed. )    BMI: Estimated body mass index is 34.33 kg/m  as calculated from the following:    Height as of 3/13/24: 1.6 m (5' 3\").    Weight as of 3/13/24: 87.9 kg (193 lb 12.8 oz).     Sedation Ordered  moderate sedation.   If patient BMI > 50 do not schedule in [facility].    If patient BMI > 45 do not schedule at [facility].    Are you taking methadone or Suboxone?  No    Have you had difficulties, pain, or discomfort during past endoscopy procedures?  No    Are you taking any prescription medications for pain 3 or more times per week?   NO, No RN review required.    Do you have a history of malignant hyperthermia?  No    (Females) Are you currently pregnant?   No     Have you been diagnosed or told you have pulmonary hypertension?   No    Do you have an LVAD?  No    Have you been told you have moderate to severe sleep apnea?  No    Have you been told you have COPD, asthma, or any other lung disease?  No    Do you have any heart conditions?  No     Have you ever had or are you waiting for an organ transplant?  No. Continue scheduling, no site restrictions.    Have you had a stroke or transient ischemic attack (TIA aka \"mini stroke\" in the last 6 months?   No    Have you been diagnosed with or been told you have cirrhosis of the liver?   No    Are you currently on dialysis?   No    Do you need assistance transferring?   No    BMI: Estimated body mass index is 34.33 kg/m  as calculated from the following:    Height as of 3/13/24: 1.6 m (5' 3\").    Weight as of 3/13/24: 87.9 kg (193 lb 12.8 oz).     Is patients BMI > 40 and scheduling " location [facility]?  No    Do you take an injectable medication for weight loss or diabetes (excluding insulin)?  No    Do you take the medication Naltrexone?  No    Do you take blood thinners?  No       Prep   Are you currently on dialysis or do you have chronic kidney disease?  No    Do you have a diagnosis of diabetes?  No    Do you have a diagnosis of cystic fibrosis (CF)?  No    On a regular basis do you go 3 -5 days between bowel movements?  No    BMI > 40?  No    Preferred Pharmacy:    [id number] [address] [address]  Phone: [phone] Fax: [phone]      Final Scheduling Details     Procedure scheduled  Colonoscopy    Surgeon:  PIERRE     Date of procedure:  6/11/24     Pre-OP / PAC:   No - Not required for this site.    Location  [facility] - Per order.    Sedation   Moderate Sedation - Per order.      Patient Reminders:   You will receive a call from a Nurse to review instructions and health history.  This assessment must be completed prior to your procedure.  Failure to complete the Nurse assessment may result in the procedure being cancelled.      On the day of your procedure, please designate an adult(s) who can drive you home stay with you for the next 24 hours. The medicines used in the exam will make you sleepy. You will not be able to drive.      You cannot take public transportation, ride share services, or non-medical taxi service without a responsible caregiver.  Medical transport services are allowed with the requirement that a responsible caregiver will receive you at your destination.  We require that drivers and caregivers are confirmed prior to your procedure.

## 2024-03-26 ENCOUNTER — TELEPHONE (OUTPATIENT)
Dept: INTERNAL MEDICINE | Facility: CLINIC | Age: 69
End: 2024-03-26

## 2024-03-26 NOTE — TELEPHONE ENCOUNTER
[employer]     Diagnosis:  Morbid Obesity (E66.01): Wt: 193 lbs, BMI: 34.33 (3/13/24)    Appointment:  Last office visit with PCP: 3/13/24  Due for: Annual Wellness Visit: August 2024    Rachel Smith RN

## 2024-04-02 NOTE — TELEPHONE ENCOUNTER
Call attempt 1/3.    LVM for patient to schedule annual wellness exam due  August 2024 . Please include RN Diagnosis notes from open encounter to the appointment notes of scheduled appointment.    Please close encounter when scheduled.

## 2024-04-22 ENCOUNTER — NURSE TRIAGE (OUTPATIENT)
Dept: INTERNAL MEDICINE | Facility: CLINIC | Age: 69
End: 2024-04-22
Payer: COMMERCIAL

## 2024-04-22 NOTE — TELEPHONE ENCOUNTER
In addition to triage below, pt requesting appointment this week or next to discuss continued abdominal pain.     LOV 3/13 pt reporting since then, her left lower abdominal pain, cramping worse with position changes, pain radiates to her back. Pt reporting mild nausea after eating. Denies vomiting or fever. Pt has not had any new urinary symptoms.     Pt reporting she thought that MD had placed an MRI order at her last office visit.   Chart check indicates that there is no order.     Routing to provider; please review and advise.

## 2024-04-22 NOTE — TELEPHONE ENCOUNTER
Patient Contact    Attempt # 1    Was call answered?  Yes.  Pt verified name and date of birth.     Please see triage.

## 2024-04-22 NOTE — TELEPHONE ENCOUNTER
Patient Returning Call    Reason for call:  Patient a few question about headaches schedule appt for her stomach pain    Information relayed to patient:  n/a    Patient has additional questions:  Yes    What are your questions/concerns:  when call back    Who does the patient want to speak with:      Is an  needed?:  No      Could we send this information to you in [facility] or would you prefer to receive a phone call?:   Patient would prefer a phone call   Okay to leave a detailed message?: Yes at Cell number on file:    Telephone Information:   Mobile [phone]

## 2024-05-30 ENCOUNTER — TELEPHONE (OUTPATIENT)
Dept: GASTROENTEROLOGY | Facility: CLINIC | Age: 69
End: 2024-05-30
Payer: COMMERCIAL

## 2024-05-30 NOTE — TELEPHONE ENCOUNTER
Pre visit planning completed.      Procedure details:    Patient scheduled for Colonoscopy  on 6/11/2024.     Arrival time: 1015. Procedure time 1100    Facility location: [facility]; [address] [place] [address] [place] [address]. Check in location: 1st Floor [facility].     Sedation type: Conscious sedation     Pre op exam needed? N/A    Indication for procedure: Screening       Chart review:     Electronic implanted devices? No    Recent diagnosis of diverticulitis within the last 6 weeks? No    Diabetic? No      Medication review:    Anticoagulants? No    NSAIDS? No NSAID medications per patient's medication list.  RN will verify with pre-assessment call.    Other medication HOLDING recommendations:  N/A      Prep for procedure:     Bowel prep recommendation: Standard Miralax  Due to: standard bowel prep.    Prep instructions sent via [employer]         Amparo Walls RN  Endoscopy Procedure Pre Assessment RN  [phone] option 4

## 2024-05-31 NOTE — TELEPHONE ENCOUNTER
Attempted to contact patient in order to complete pre assessment questions.     No answer. Left message to return call to [phone] option 4    Callback required communication sent via ePAR.      Isadora Bueno RN  Endoscopy Procedure Pre Assessment

## 2024-06-04 NOTE — TELEPHONE ENCOUNTER
Second call attempt to complete pre assessment.     No answer.  Left message to return call to [phone] #4 by next business day prior to 4PM or procedure will be sent to cancel.     Callback required communication sent via Digital Path.      Diana Zurita RN  Endoscopy Procedure Pre Assessment

## 2024-06-04 NOTE — TELEPHONE ENCOUNTER
Pre assessment completed for upcoming procedure.   (Please see previous telephone encounter notes for complete details)    Patient  returned call.       Procedure details:    Arrival time and facility location reviewed.    Pre op exam needed? N/A    Designated  policy reviewed. Instructed to have someone stay 6  hours post procedure.       Medication review:    Medications reviewed. Please see supporting documentation below. Holding recommendations discussed (if applicable).   NSAID medication(s): Naproxen (Aleve, Naprosyn): HOLD 4 days before procedure.       Prep for procedure:     Procedure prep instructions reviewed.        Any additional information needed:  N/A      Patient  verbalized understanding and had no questions or concerns at this time.      Nikki Tomlinson RN  Endoscopy Procedure Pre Assessment   [phone] option 4

## 2024-06-11 ENCOUNTER — HOSPITAL ENCOUNTER (OUTPATIENT)
Facility: CLINIC | Age: 69
Discharge: HOME | End: 2024-06-11
Attending: COLON & RECTAL SURGERY | Admitting: COLON & RECTAL SURGERY
Payer: COMMERCIAL

## 2024-06-11 VITALS
HEIGHT: 63 IN | SYSTOLIC BLOOD PRESSURE: 129 MMHG | HEART RATE: 77 BPM | BODY MASS INDEX: 33.66 KG/M2 | RESPIRATION RATE: 13 BRPM | OXYGEN SATURATION: 98 % | WEIGHT: 190 LBS | DIASTOLIC BLOOD PRESSURE: 75 MMHG

## 2024-06-11 LAB — COLONOSCOPY: NORMAL

## 2024-06-11 PROCEDURE — G0500 MOD SEDAT ENDO SERVICE >5YRS: HCPCS | Mod: PT | Performed by: COLON & RECTAL SURGERY

## 2024-06-11 PROCEDURE — 45385 COLONOSCOPY W/LESION REMOVAL: CPT | Performed by: COLON & RECTAL SURGERY

## 2024-06-11 PROCEDURE — 250N000011 HC RX IP 250 OP 636: Mod: JZ | Performed by: COLON & RECTAL SURGERY

## 2024-06-11 PROCEDURE — 88305 TISSUE EXAM BY PATHOLOGIST: CPT | Mod: TC | Performed by: COLON & RECTAL SURGERY

## 2024-06-11 RX ORDER — ONDANSETRON 2 MG/ML
4 INJECTION INTRAMUSCULAR; INTRAVENOUS EVERY 6 HOURS PRN
Status: DISCONTINUED | OUTPATIENT
Start: 2024-06-11 | End: 2024-06-11 | Stop reason: HOSPADM

## 2024-06-11 RX ORDER — LIDOCAINE 40 MG/G
CREAM TOPICAL
Status: DISCONTINUED | OUTPATIENT
Start: 2024-06-11 | End: 2024-06-11 | Stop reason: HOSPADM

## 2024-06-11 RX ORDER — FENTANYL CITRATE 50 UG/ML
INJECTION, SOLUTION INTRAMUSCULAR; INTRAVENOUS PRN
Status: DISCONTINUED | OUTPATIENT
Start: 2024-06-11 | End: 2024-06-11 | Stop reason: HOSPADM

## 2024-06-11 RX ORDER — NALOXONE HYDROCHLORIDE 0.4 MG/ML
0.2 INJECTION, SOLUTION INTRAMUSCULAR; INTRAVENOUS; SUBCUTANEOUS
Status: DISCONTINUED | OUTPATIENT
Start: 2024-06-11 | End: 2024-06-11 | Stop reason: HOSPADM

## 2024-06-11 RX ORDER — NALOXONE HYDROCHLORIDE 0.4 MG/ML
0.4 INJECTION, SOLUTION INTRAMUSCULAR; INTRAVENOUS; SUBCUTANEOUS
Status: DISCONTINUED | OUTPATIENT
Start: 2024-06-11 | End: 2024-06-11 | Stop reason: HOSPADM

## 2024-06-11 RX ORDER — ONDANSETRON 2 MG/ML
4 INJECTION INTRAMUSCULAR; INTRAVENOUS
Status: DISCONTINUED | OUTPATIENT
Start: 2024-06-11 | End: 2024-06-11 | Stop reason: HOSPADM

## 2024-06-11 RX ORDER — ONDANSETRON 4 MG/1
4 TABLET, ORALLY DISINTEGRATING ORAL EVERY 6 HOURS PRN
Status: DISCONTINUED | OUTPATIENT
Start: 2024-06-11 | End: 2024-06-11 | Stop reason: HOSPADM

## 2024-06-11 RX ORDER — PROCHLORPERAZINE MALEATE 5 MG
5 TABLET ORAL EVERY 6 HOURS PRN
Status: DISCONTINUED | OUTPATIENT
Start: 2024-06-11 | End: 2024-06-11 | Stop reason: HOSPADM

## 2024-06-11 RX ORDER — FLUMAZENIL 0.1 MG/ML
0.2 INJECTION, SOLUTION INTRAVENOUS
Status: DISCONTINUED | OUTPATIENT
Start: 2024-06-11 | End: 2024-06-11 | Stop reason: HOSPADM

## 2024-06-11 ASSESSMENT — ACTIVITIES OF DAILY LIVING (ADL)
ADLS_ACUITY_SCORE: 35
ADLS_ACUITY_SCORE: 33
ADLS_ACUITY_SCORE: 33

## 2024-06-11 NOTE — H&P
Pre-Endoscopy History and Physical   Nanci Vergara MRN# [id number]   YOB: 1955 Age: 69 year old   Date of Procedure: 6/11/2024   Primary care provider: Everette Way   Type of Endoscopy: colonoscopy   Reason for Procedure: screening   Type of Anesthesia Anticipated: Moderate Sedation   HPI:   Nanci is a 69 year old female for screening colonoscopy.  She last had a colonoscopy in 2014 that was normal.  She denies BRBPR, changes in bowel habits, nausea/vomiting or unintentional weight loss.  She does report some intermittent LLQ abdominal pain.  She denies a FH of CRC.    Allergies   Allergen Reactions    Seasonal Allergies       Prior to Admission Medications   Prescriptions Last Dose Informant Patient Reported? Taking?   Cholecalciferol (VITAMIN D) 2000 UNITS tablet Past Week  No Yes   Sig: Take 2,000 Units by mouth daily   albuterol (PROAIR HFA/PROVENTIL HFA/VENTOLIN HFA) 108 (90 Base) MCG/ACT inhaler Past Month  No Yes   Sig: INHALE 2 PUFFS INTO THE LUNGS 2 TIMES DAILY AND EVERY 6 HRS AS NEEDED   hydrochlorothiazide (HYDRODIURIL) 25 MG tablet 6/10/2024  No Yes   Sig: Take 1 tablet (25 mg) by mouth daily      Facility-Administered Medications: None      Patient Active Problem List   Diagnosis    Vitamin D deficiency    Family history of diabetes mellitus    Arthralgia of temporomandibular joint    Anemia    Hearing loss    Multinodular goiter (nontoxic)    Sciatica    Hyperlipidemia with target LDL less than 130    ACP (advance care planning)    Bursitis, knee, left    Paresthesia of left foot    Chronic pain of left knee    Paresthesias    Bilateral leg edema    Gastroesophageal reflux disease without esophagitis    Exercise-induced asthma    Midline low back pain without sciatica, unspecified chronicity    Morbid obesity (H)    Osteopenia of multiple sites    Thyroid nodule    Benign essential hypertension      Past Medical History:   Diagnosis Date    Arthritis     Benign essential  "hypertension 8/23/2023    Cervical high risk HPV (human papillomavirus) test positive 12/2014    nil pap, + HPV (not 16 or 18)    Chest wall pain 2/10    see cardiology note; MRI heart nml    Exercise-induced asthma 9/11/2020    Multinodular goiter (nontoxic)     bx non-Dx; watery colloid ; not enough cells      Past Surgical History:   Procedure Laterality Date    COLONOSCOPY      HYSTERECTOMY  08/01/1997    supra cervical abdominal hysterectomy; ovaries remain      Social History     Tobacco Use    Smoking status: Never    Smokeless tobacco: Never   Substance Use Topics    Alcohol use: No      Family History   Problem Relation Age of Onset    Diabetes Mother         Mechelle Tatum    Heart Disease Brother     Diabetes Type 2  Brother     Pancreatic Cancer Brother         ?    Multiple myeloma Brother       PHYSICAL EXAM:   /71   Pulse 68   Resp 20   Ht 1.6 m (5' 3\")   Wt 86.2 kg (190 lb)   LMP 10/29/1998 (Exact Date)   SpO2 100%   BMI 33.66 kg/m   Estimated body mass index is 33.66 kg/m  as calculated from the following:    Height as of this encounter: 1.6 m (5' 3\").    Weight as of this encounter: 86.2 kg (190 lb).   RESP: lungs clear to auscultation - no rales, rhonchi or wheezes   CV: regular rates and rhythm   ASA Class 2 - Mild systemic disease    Assessment: 70 y/o woman for screening colonoscopy    Plan: Colonoscopy with moderate sedation.  Risks of the procedure were discussed including, but not limited to, bleeding, perforation and missed lesions.  Patient understands and is willing to proceed.    Thomas Arzate MD ....................  6/11/2024   11:23 AM  Colon and Rectal Surgery Staff  [phone]     "

## 2024-06-12 LAB
PATH REPORT.COMMENTS IMP SPEC: NORMAL
PATH REPORT.COMMENTS IMP SPEC: NORMAL
PATH REPORT.FINAL DX SPEC: NORMAL
PATH REPORT.GROSS SPEC: NORMAL
PATH REPORT.MICROSCOPIC SPEC OTHER STN: NORMAL
PATH REPORT.RELEVANT HX SPEC: NORMAL
PHOTO IMAGE: NORMAL

## 2024-06-12 PROCEDURE — 88305 TISSUE EXAM BY PATHOLOGIST: CPT | Mod: 26 | Performed by: PATHOLOGY

## 2024-07-19 ENCOUNTER — PATIENT OUTREACH (OUTPATIENT)
Dept: CARE COORDINATION | Facility: CLINIC | Age: 69
End: 2024-07-19
Payer: COMMERCIAL

## 2024-07-25 ENCOUNTER — HOSPITAL ENCOUNTER (OUTPATIENT)
Dept: ULTRASOUND IMAGING | Facility: CLINIC | Age: 69
Discharge: HOME | End: 2024-07-25
Attending: INTERNAL MEDICINE | Admitting: INTERNAL MEDICINE
Payer: COMMERCIAL

## 2024-07-25 DIAGNOSIS — E04.2 MULTINODULAR GOITER (NONTOXIC): ICD-10-CM

## 2024-07-25 PROCEDURE — 76536 US EXAM OF HEAD AND NECK: CPT

## 2024-07-29 ENCOUNTER — HOSPITAL ENCOUNTER (OUTPATIENT)
Dept: MAMMOGRAPHY | Facility: CLINIC | Age: 69
Discharge: HOME | End: 2024-07-29
Attending: INTERNAL MEDICINE | Admitting: INTERNAL MEDICINE
Payer: COMMERCIAL

## 2024-07-29 DIAGNOSIS — Z12.31 VISIT FOR SCREENING MAMMOGRAM: ICD-10-CM

## 2024-07-29 PROCEDURE — 77063 BREAST TOMOSYNTHESIS BI: CPT

## 2024-10-13 ENCOUNTER — HEALTH MAINTENANCE LETTER (OUTPATIENT)
Age: 69
End: 2024-10-13

## 2024-10-17 ENCOUNTER — OFFICE VISIT (OUTPATIENT)
Dept: INTERNAL MEDICINE | Facility: CLINIC | Age: 69
End: 2024-10-17
Payer: COMMERCIAL

## 2024-10-17 VITALS
BODY MASS INDEX: 33.49 KG/M2 | HEIGHT: 63 IN | WEIGHT: 189 LBS | SYSTOLIC BLOOD PRESSURE: 112 MMHG | TEMPERATURE: 97.5 F | OXYGEN SATURATION: 98 % | DIASTOLIC BLOOD PRESSURE: 70 MMHG | HEART RATE: 74 BPM | RESPIRATION RATE: 16 BRPM

## 2024-10-17 DIAGNOSIS — E78.5 HYPERLIPIDEMIA WITH TARGET LDL LESS THAN 130: ICD-10-CM

## 2024-10-17 DIAGNOSIS — Z00.00 ENCOUNTER FOR MEDICARE ANNUAL WELLNESS EXAM: Primary | ICD-10-CM

## 2024-10-17 DIAGNOSIS — I10 BENIGN ESSENTIAL HYPERTENSION: ICD-10-CM

## 2024-10-17 DIAGNOSIS — R10.32 LLQ ABDOMINAL PAIN: ICD-10-CM

## 2024-10-17 DIAGNOSIS — E55.9 VITAMIN D DEFICIENCY: ICD-10-CM

## 2024-10-17 PROBLEM — E66.01 MORBID OBESITY (H): Status: RESOLVED | Noted: 2021-10-15 | Resolved: 2024-10-17

## 2024-10-17 PROCEDURE — 99214 OFFICE O/P EST MOD 30 MIN: CPT | Mod: 25 | Performed by: INTERNAL MEDICINE

## 2024-10-17 PROCEDURE — G0439 PPPS, SUBSEQ VISIT: HCPCS | Performed by: INTERNAL MEDICINE

## 2024-10-17 SDOH — HEALTH STABILITY: PHYSICAL HEALTH: ON AVERAGE, HOW MANY MINUTES DO YOU ENGAGE IN EXERCISE AT THIS LEVEL?: 60 MIN

## 2024-10-17 SDOH — HEALTH STABILITY: PHYSICAL HEALTH: ON AVERAGE, HOW MANY DAYS PER WEEK DO YOU ENGAGE IN MODERATE TO STRENUOUS EXERCISE (LIKE A BRISK WALK)?: 3 DAYS

## 2024-10-17 ASSESSMENT — ASTHMA QUESTIONNAIRES
QUESTION_4 LAST FOUR WEEKS HOW OFTEN HAVE YOU USED YOUR RESCUE INHALER OR NEBULIZER MEDICATION (SUCH AS ALBUTEROL): ONCE A WEEK OR LESS
QUESTION_5 LAST FOUR WEEKS HOW WOULD YOU RATE YOUR ASTHMA CONTROL: WELL CONTROLLED
QUESTION_1 LAST FOUR WEEKS HOW MUCH OF THE TIME DID YOUR ASTHMA KEEP YOU FROM GETTING AS MUCH DONE AT WORK, SCHOOL OR AT HOME: NONE OF THE TIME
QUESTION_3 LAST FOUR WEEKS HOW OFTEN DID YOUR ASTHMA SYMPTOMS (WHEEZING, COUGHING, SHORTNESS OF BREATH, CHEST TIGHTNESS OR PAIN) WAKE YOU UP AT NIGHT OR EARLIER THAN USUAL IN THE MORNING: ONCE OR TWICE
ACT_TOTALSCORE: 21
QUESTION_2 LAST FOUR WEEKS HOW OFTEN HAVE YOU HAD SHORTNESS OF BREATH: ONCE OR TWICE A WEEK
ACT_TOTALSCORE: 21

## 2024-10-17 ASSESSMENT — SOCIAL DETERMINANTS OF HEALTH (SDOH): HOW OFTEN DO YOU GET TOGETHER WITH FRIENDS OR RELATIVES?: TWICE A WEEK

## 2024-10-17 NOTE — PROGRESS NOTES
"Preventive Care Visit  [facility]  Everette Way MD, Internal Medicine  Oct 17, 2024      Assessment & Plan     Encounter for Medicare annual wellness exam  Updated screening, immunizations, prevention.  Please see health maintenance list, care gaps     Benign essential hypertension  Well controlled. Continue meds  - Basic metabolic panel  (Ca, Cl, CO2, Creat, Gluc, K, Na, BUN); Future    Hyperlipidemia with target LDL less than 130  Recheck- CV risk   - Lipid panel reflex to direct LDL Non-fasting; Future    Asthma  Well controlled.     LLQ abdominal pain  Somewhat chronic, up to > 1 yr- no worrisome associated symptoms or findings - recent colonoscopy that was normal w/o diverticulosis.   Seems almost more pelvic- not concerning but she has already set up a gyn appt for eval.  Will get pelvic u/s but overly concerned about an intra-abd source if gyn w/u negative   - CBC with platelets; Future  - US Pelvic Complete with Transvaginal; Future    Vitamin D deficiency  Recheck   - Vitamin D Deficiency; Future    Patient has been advised of split billing requirements and indicates understanding: Yes        BMI  Estimated body mass index is 33.48 kg/m  as calculated from the following:    Height as of this encounter: 1.6 m (5' 3\").    Weight as of this encounter: 85.7 kg (189 lb).   Weight management plan: Discussed healthy diet and exercise guidelines    Counseling  Appropriate preventive services were addressed with this patient via screening, questionnaire, or discussion as appropriate for fall prevention, nutrition, physical activity, Tobacco-use cessation, social engagement, weight loss and cognition.  Checklist reviewing preventive services available has been given to the patient.  Reviewed patient's diet, addressing concerns and/or questions.   She is at risk for lack of exercise and has been provided with information to increase physical activity for the benefit of her well-being.   She " is at risk for psychosocial distress and has been provided with information to reduce risk.   Discussed possible causes of fatigue.     Work on weight loss  Regular exercise  See Patient Instructions    Subjective   Nanci is a 69 year old, presenting for the following:  Wellness Visit        Health Care Directive  Patient does not have a Health Care Directive or Living Will: Discussed advance care planning with patient; however, patient declined at this time.    HPI  Notes some chronic lowe grade LLL/pelvic pain. Scheduled a ob gyn appt for later this month due to this. Is s/p supracervical hysterectomy. Uncertain if salpingectomy done . Ovaries intact.             10/17/2024   General Health   How would you rate your overall physical health? Good   Feel stress (tense, anxious, or unable to sleep) Only a little      (!) STRESS CONCERN      10/17/2024   Nutrition   Diet: Regular (no restrictions)            10/17/2024   Exercise   Days per week of moderate/strenous exercise 3 days   Average minutes spent exercising at this level 60 min            10/17/2024   Social Factors   Frequency of gathering with friends or relatives Twice a week   Worry food won't last until get money to buy more No   Food not last or not have enough money for food? No   Do you have housing? (Housing is defined as stable permanent housing and does not include staying ouside in a car, in a tent, in an abandoned building, in an overnight shelter, or couch-surfing.) Yes   Are you worried about losing your housing? No   Lack of transportation? No   Unable to get utilities (heat,electricity)? No            10/17/2024   Fall Risk   Fallen 2 or more times in the past year? No    No   Trouble with walking or balance? No    No       Multiple values from one day are sorted in reverse-chronological order          10/17/2024   Activities of Daily Living- Home Safety   Needs help with the following daily activites None of the above   Safety concerns in  the home None of the above            10/17/2024   Dental   Dentist two times every year? Yes            10/17/2024   Hearing Screening   Hearing concerns? None of the above            10/17/2024   Driving Risk Screening   Patient/family members have concerns about driving No            10/17/2024   General Alertness/Fatigue Screening   Have you been more tired than usual lately? (!) YES            10/17/2024   Urinary Incontinence Screening   Bothered by leaking urine in past 6 months No            10/17/2024   TB Screening   Were you born outside of the [place]? No            Today's PHQ-2 Score:       10/17/2024     9:24 AM   PHQ-2 ( 1999 [employer])   Q1: Little interest or pleasure in doing things 0   Q2: Feeling down, depressed or hopeless 0   PHQ-2 Score 0   Q1: Little interest or pleasure in doing things Not at all   Q2: Feeling down, depressed or hopeless Not at all   PHQ-2 Score 0           10/17/2024   Substance Use   Alcohol more than 3/day or more than 7/wk No   Do you have a current opioid prescription? No   How severe/bad is pain from 1 to 10? 2/10   Do you use any other substances recreationally? No        Social History     Tobacco Use    Smoking status: Never    Smokeless tobacco: Never   Vaping Use    Vaping status: Never Used   Substance Use Topics    Alcohol use: No    Drug use: No           7/29/2024   LAST FHS-7 RESULTS   1st degree relative breast or ovarian cancer No   Any relative bilateral breast cancer No   Any male have breast cancer No   Any ONE woman have BOTH breast AND ovarian cancer No   Any woman with breast cancer before 50yrs No   2 or more relatives with breast AND/OR ovarian cancer No   2 or more relatives with breast AND/OR bowel cancer No               ASCVD Risk   The 10-year ASCVD risk score (Johanna ASIF, et al., 2019) is: 10.7%    Values used to calculate the score:      Age: 69 years      Sex: Female      Is Non- : Yes      Diabetic: No      Tobacco  "smoker: No      Systolic Blood Pressure: 112 mmHg      Is BP treated: Yes      HDL Cholesterol: 60 mg/dL      Total Cholesterol: 213 mg/dL            Reviewed and updated as needed this visit by Provider                      Current providers sharing in care for this patient include:  Patient Care Team:  Everette Way MD as PCP - General (Internal Medicine)  Everette Way MD as Assigned PCP  Keri Biswas MD as Physician (OB/Gyn)    The following health maintenance items are reviewed in Epic and correct as of today:  Health Maintenance   Topic Date Due    ZOSTER IMMUNIZATION (1 of 2) Never done    RSV VACCINE (1 - Risk 60-74 years 1-dose series) Never done    ASTHMA ACTION PLAN  04/08/2022    DTAP/TDAP/TD IMMUNIZATION (2 - Td or Tdap) 05/24/2023    MEDICARE ANNUAL WELLNESS VISIT  08/18/2024    LIPID  08/18/2024    INFLUENZA VACCINE (1) Never done    COVID-19 Vaccine (5 - 2024-25 season) 09/01/2024    BMP  12/04/2024    GLUCOSE  12/04/2024    ANNUAL REVIEW OF HM ORDERS  03/13/2025    ASTHMA CONTROL TEST  04/17/2025    FALL RISK ASSESSMENT  10/17/2025    MAMMO SCREENING  07/29/2026    ADVANCE CARE PLANNING  08/23/2028    DEXA  07/17/2038    HEPATITIS C SCREENING  Completed    PHQ-2 (once per calendar year)  Completed    Pneumococcal Vaccine: 65+ Years  Completed    HPV IMMUNIZATION  Aged Out    MENINGITIS IMMUNIZATION  Aged Out    RSV MONOCLONAL ANTIBODY  Aged Out    COLORECTAL CANCER SCREENING  Discontinued            Objective    Exam  /70   Pulse 74   Temp 97.5  F (36.4  C) (Temporal)   Resp 16   Ht 1.6 m (5' 3\")   Wt 85.7 kg (189 lb)   LMP 10/29/1998 (Exact Date)   SpO2 98%   BMI 33.48 kg/m     Estimated body mass index is 33.48 kg/m  as calculated from the following:    Height as of this encounter: 1.6 m (5' 3\").    Weight as of this encounter: 85.7 kg (189 lb).    Physical Exam  GENERAL: alert and no distress  EYES: Eyes grossly normal to inspection, PERRL and conjunctivae and sclerae " normal  HENT: ear canals and TM's normal, nose and mouth without ulcers or lesions  NECK: no adenopathy, no asymmetry, masses, or scars  RESP: lungs clear to auscultation - no rales, rhonchi or wheezes  CV: regular rate and rhythm, normal S1 S2, no S3 or S4, no murmur, click or rub, no peripheral edema  ABDOMEN: mild , no rebound or guarding, tenderness lower left quad. Soft abd.   MS: no gross musculoskeletal defects noted, no edema  SKIN: no suspicious lesions or rashes  NEURO: Normal strength and tone, mentation intact and speech normal  PSYCH: mentation appears normal, affect normal/bright  LYMPH: normal ant/post cervical, supraclavicular nodes  inguinal: no adenopathy        10/17/2024   Mini Cog   Clock Draw Score 2 Normal   3 Item Recall 3 objects recalled   Mini Cog Total Score 5                 Signed Electronically by: Everette Way MD

## 2024-10-17 NOTE — PATIENT INSTRUCTIONS
Patient Education   Preventive Care Advice   This is general advice given by our system to help you stay healthy. However, your care team may have specific advice just for you. Please talk to your care team about your preventive care needs.  Nutrition  Eat 5 or more servings of fruits and vegetables each day.  Try wheat bread, brown rice and whole grain pasta (instead of white bread, rice, and pasta).  Get enough calcium and vitamin D. Check the label on foods and aim for 100% of the RDA (recommended daily allowance).  Lifestyle  Exercise at least 150 minutes each week  (30 minutes a day, 5 days a week).  Do muscle strengthening activities 2 days a week. These help control your weight and prevent disease.  No smoking.  Wear sunscreen to prevent skin cancer.  Have a dental exam and cleaning every 6 months.  Yearly exams  See your health care team every year to talk about:  Any changes in your health.  Any medicines your care team has prescribed.  Preventive care, family planning, and ways to prevent chronic diseases.  Shots (vaccines)   HPV shots (up to age 26), if you've never had them before.  Hepatitis B shots (up to age 59), if you've never had them before.  COVID-19 shot: Get this shot when it's due.  Flu shot: Get a flu shot every year.  Tetanus shot: Get a tetanus shot every 10 years.  Pneumococcal, hepatitis A, and RSV shots: Ask your care team if you need these based on your risk.  Shingles shot (for age 50 and up)  General health tests  Diabetes screening:  Starting at age 35, Get screened for diabetes at least every 3 years.  If you are younger than age 35, ask your care team if you should be screened for diabetes.  Cholesterol test: At age 39, start having a cholesterol test every 5 years, or more often if advised.  Bone density scan (DEXA): At age 50, ask your care team if you should have this scan for osteoporosis (brittle bones).  Hepatitis C: Get tested at least once in your life.  STIs (sexually  transmitted infections)  Before age 24: Ask your care team if you should be screened for STIs.  After age 24: Get screened for STIs if you're at risk. You are at risk for STIs (including HIV) if:  You are sexually active with more than one person.  You don't use condoms every time.  You or a partner was diagnosed with a sexually transmitted infection.  If you are at risk for HIV, ask about PrEP medicine to prevent HIV.  Get tested for HIV at least once in your life, whether you are at risk for HIV or not.  Cancer screening tests  Cervical cancer screening: If you have a cervix, begin getting regular cervical cancer screening tests starting at age 21.  Breast cancer scan (mammogram): If you've ever had breasts, begin having regular mammograms starting at age 40. This is a scan to check for breast cancer.  Colon cancer screening: It is important to start screening for colon cancer at age 45.  Have a colonoscopy test every 10 years (or more often if you're at risk) Or, ask your provider about stool tests like a FIT test every year or Cologuard test every 3 years.  To learn more about your testing options, visit:   .  For help making a decision, visit:   https://bit.ly/fl71522.  Prostate cancer screening test: If you have a prostate, ask your care team if a prostate cancer screening test (PSA) at age 55 is right for you.  Lung cancer screening: If you are a current or former smoker ages 50 to 80, ask your care team if ongoing lung cancer screenings are right for you.  For informational purposes only. Not to replace the advice of your health care provider. Copyright   2023 [facility]. All rights reserved. Clinically reviewed by the [facility]. Key[employer] 718451 - REV 01/24.  Learning About Sleeping Well  What does sleeping well mean?     Sleeping well means getting enough sleep to feel good and stay healthy. How much sleep is enough varies among people.  The number of hours you  sleep and how you feel when you wake up are both important. If you do not feel refreshed, you probably need more sleep. Another sign of not getting enough sleep is feeling tired during the day.  Experts recommend that adults get at least 7 or more hours of sleep per day. Children and older adults need more sleep.  Why is getting enough sleep important?  Getting enough quality sleep is a basic part of good health. When your sleep suffers, your physical health, mood, and your thoughts can suffer too. You may find yourself feeling more grumpy or stressed. Not getting enough sleep also can lead to serious problems, including injury, accidents, anxiety, and depression.  What might cause poor sleeping?  Many things can cause sleep problems, including:  Changes to your sleep schedule.  Stress. Stress can be caused by fear about a single event, such as giving a speech. Or you may have ongoing stress, such as worry about work or school.  Depression, anxiety, and other mental or emotional conditions.  Changes in your sleep habits or surroundings. This includes changes that happen where you sleep, such as noise, light, or sleeping in a different bed. It also includes changes in your sleep pattern, such as having jet lag or working a late shift.  Health problems, such as pain, breathing problems, and restless legs syndrome.  Lack of regular exercise.  Using alcohol, nicotine, or caffeine before bed.  How can you help yourself?  Here are some tips that may help you sleep more soundly and wake up feeling more refreshed.  Your sleeping area   Use your bedroom only for sleeping and sex. A bit of light reading may help you fall asleep. But if it doesn't, do your reading elsewhere in the house. Try not to use your TV, computer, smartphone, or tablet while you are in bed.  Be sure your bed is big enough to stretch out comfortably, especially if you have a sleep partner.  Keep your bedroom quiet, dark, and cool. Use curtains, blinds,  "or a sleep mask to block out light. To block out noise, use earplugs, soothing music, or a \"white noise\" machine.  Your evening and bedtime routine   Create a relaxing bedtime routine. You might want to take a warm shower or bath, or listen to soothing music.  Go to bed at the same time every night. And get up at the same time every morning, even if you feel tired.  What to avoid   Limit caffeine (coffee, tea, caffeinated sodas) during the day, and don't have any for at least 6 hours before bedtime.  Avoid drinking alcohol before bedtime. Alcohol can cause you to wake up more often during the night.  Try not to smoke or use tobacco, especially in the evening. Nicotine can keep you awake.  Limit naps during the day, especially close to bedtime.  Avoid lying in bed awake for too long. If you can't fall asleep or if you wake up in the middle of the night and can't get back to sleep within about 20 minutes, get out of bed and go to another room until you feel sleepy.  Avoid taking medicine right before bed that may keep you awake or make you feel hyper or energized. Your doctor can tell you if your medicine may do this and if you can take it earlier in the day.  If you can't sleep   Imagine yourself in a peaceful, pleasant scene. Focus on the details and feelings of being in a place that is relaxing.  Get up and do a quiet or boring activity until you feel sleepy.  Avoid drinking any liquids before going to bed to help prevent waking up often to use the bathroom.  Where can you learn more?  Go to https://www."[employer].net/patiented  Enter J942 in the search box to learn more about \"Learning About Sleeping Well.\"  Current as of: July 10, 2023  Content Version: 14.2 2024 [employer].   Care instructions adapted under license by your healthcare professional. If you have questions about a medical condition or this instruction, always ask your healthcare professional. [employer] disclaims any " warranty or liability for your use of this information.

## 2024-10-24 NOTE — PROGRESS NOTES
"  SUBJECTIVE:                                                   Nanci Vergara is a 69 year old  female who presents to clinic today for the following health issue(s):  Patient presents with:  Gyn Exam    Additional information: followed by primary care provider for routine care    HPI:  Here for breast and pelvic exam. Recently had annual with her PCP.   Only GYN concern today is some vaginal dryness. Has never tried any previous treatment for this.  No breast concerns. Regular mammogram and last in July was negative.   Has had some intermittent LLQ pain and her PCP has ordered a future pelvic US to further evaluate.   No postmenopausal bleeding.   Getting COVID booster next week.   Very irregular hot flashes but not impacting sleep/quality of life.   Has not had a pap smear in several years. Per CE   \"Pt age 63   Normal paps in  11: NIL pap  10/29/12: NIL pap  2014: NIL pap, + HPV (not 16 or 18). Plan cotest pap & HPV in 1 year.    12/28/15: NIL pap, neg HPV. Plan cotest pap & HPV in 3 years.  09/10/18: NIL pap, neg HR HPV result. Plan routine screening.   No history of MATTHEW 2 or greater found in epic.   No further cervical cancer screening recommended.   Hm updated. \"    Patient's last menstrual period was 10/29/1998 (exact date)..   Patient is sexually active, .   reports that she has never smoked. She has never used smokeless tobacco.  STD testing offered?  Declined    Health maintenance reviewed.Immunizations reviewed, plans to get COVID vax at the pharmacy.    Today's PHQ-2 Score:       10/17/2024     9:24 AM   PHQ-2 (  [employer])   Q1: Little interest or pleasure in doing things 0    Q2: Feeling down, depressed or hopeless 0    PHQ-2 Score 0   Q1: Little interest or pleasure in doing things Not at all   Q2: Feeling down, depressed or hopeless Not at all   PHQ-2 Score 0       Patient-reported     Today's PHQ-9 Score:       2019     3:03 PM   PHQ-9 SCORE   PHQ-9 Total Score MyChart 0 "   PHQ-9 Total Score 0     Today's DEVONTE-7 Score:       5/17/2019     3:04 PM   DEVONTE-7 SCORE   Total Score 0 (minimal anxiety)   Total Score 0       Problem list and histories reviewed & adjusted, as indicated.  Additional history: as documented.    Patient Active Problem List   Diagnosis    Vitamin D deficiency    Arthralgia of temporomandibular joint    Anemia    Hearing loss    Multinodular goiter (nontoxic)    Sciatica    Hyperlipidemia with target LDL less than 130    ACP (advance care planning)    Bursitis, knee, left    Paresthesia of left foot    Chronic pain of left knee    Paresthesias    Bilateral leg edema    Gastroesophageal reflux disease without esophagitis    Exercise-induced asthma    Midline low back pain without sciatica, unspecified chronicity    Osteopenia of multiple sites    Thyroid nodule    Benign essential hypertension     Past Surgical History:   Procedure Laterality Date    COLONOSCOPY      COLONOSCOPY N/A 06/11/2024    Procedure: Colonoscopy;  Surgeon: Thomas Arzate MD;  Location:  GI    GYN SURGERY      PARTIAL HIST    HYSTERECTOMY  08/01/1997    supra cervical abdominal hysterectomy; ovaries remain      Social History     Tobacco Use    Smoking status: Never    Smokeless tobacco: Never   Substance Use Topics    Alcohol use: No      Problem (# of Occurrences) Relation (Name,Age of Onset)    Diabetes (1) Mother (Mechelle Tatum): NONE    Heart Disease (1) Brother    Multiple myeloma (1) Brother    Pancreatic Cancer (1) Brother: ?    Diabetes Type 2  (1) Brother              Current Outpatient Medications   Medication Sig Dispense Refill    albuterol (PROAIR HFA/PROVENTIL HFA/VENTOLIN HFA) 108 (90 Base) MCG/ACT inhaler INHALE 2 PUFFS INTO THE LUNGS 2 TIMES DAILY AND EVERY 6 HRS AS NEEDED 18 g 11    Cholecalciferol (VITAMIN D) 2000 UNITS tablet Take 2,000 Units by mouth daily 100 tablet 11    hydrochlorothiazide (HYDRODIURIL) 25 MG tablet Take 1 tablet (25 mg) by mouth daily 90 tablet 3  "    No current facility-administered medications for this visit.     Allergies   Allergen Reactions    Seasonal Allergies        ROS:    No urinary frequency or dysuria, bladder or kidney problems      OBJECTIVE:     /58   Ht 1.6 m (5' 3\")   Wt 85.3 kg (188 lb)   LMP 10/29/1998 (Exact Date)   BMI 33.30 kg/m    Body mass index is 33.3 kg/m .    Exam:  Constitutional:  Appearance: Well nourished, well developed alert, in no acute distress  Chest:  Respiratory Effort:  Breathing unlabored.   Cardiovascular: Heart: Normotensive  Breasts:  Inspection of Breasts:  Symmetric bilaterally.  No puckering.  No skin changes.  Palpation of Breasts and Axillae:  No masses present on palpation, no breast tenderness Axillary Lymph Nodes:  No lymphadenopathy present  Gastrointestinal:  Abdominal Examination:  Abdomen nontender to palpation  Neurologic:  Mental Status:  Oriented X3.  Normal strength and tone, sensory exam grossly normal, mentation intact and speech normal.    Psychiatric:  Mentation appears normal and affect normal/bright.  Pelvic Exam:  External Genitalia:     Normal appearance for age, no discharge present, no tenderness present, no inflammatory lesions present, color normal  Vagina:     Normal vaginal vault without central or paravaginal defects, Mild atrophy   Bladder:     Nontender to palpation  Urethra:   Urethral Body:  Urethra palpation normal, urethra structural support normal   Urethral Meatus:  No erythema or lesions present  Cervix:     Appearance healthy, no lesions present, nontender to palpation, no bleeding present  Uterus:     Nontender to palpation, no masses present, position anteflexed, mobility: normal  Adnexa:     No adnexal tenderness present, no adnexal masses present  Perineum:     Perineum within normal limits, no evidence of trauma, no rashes or skin lesions present    Elvia Rivera MA present as chaperone during sensitive exam.     TESTS  BILATERAL FULL FIELD DIGITAL SCREENING " MAMMOGRAM WITH TOMOSYNTHESIS     Performed on: 7/29/24     Compared to: 07/17/2023 and 06/20/2017     Technique:  This study was evaluated with the assistance of Computer-Aided Detection.  Breast Tomosynthesis was used in interpretation.     Findings: There are scattered areas of fibroglandular density.  There is no radiographic evidence of malignancy.                                                                    IMPRESSION: ACR BI-RADS Category 1: Negative     BREAST CANCER SCREENING RECOMMENDATION: Routine yearly mammography beginning at age 40 or as discussed with your provider.     The results and recommendations of this examination will be communicated to the patient.    ASSESSMENT/PLAN:                                                        ICD-10-CM    1. Encounter for gynecological examination without abnormal finding  Z01.419       2. Vaginal atrophy  N95.2         - Normal breast and pelvic exam.  -UTD on mammogram screening, due in 1 year  - Discussed discontinuation of paps at 63 with no history of CIN2 and previous normal screening x2  - Has experienced vaginal dryness and some mild atrophy on exam. Discussed options for expectant management, OTC vaginal moisturizers, or vaginal estrogen. Patient is most interested in starting with vaginal moisturizers. If unsuccessful and desires vaginal estrogen prescription, she will send me a message through [facility].   Vaginal moisturizers are intended for routine use (not for use during sexual activity). They may decrease vaginal dryness for up to three days with a single application. These products are meant to be applied regularly for an extended time for maximum benefit. Some patients find it helpful to apply in the morning so movement during the day can help coat the vaginal tissue. Vaginal moisturizers are available over the counter (without a prescription) at most stores that sell feminine hygiene products as well as online. Hyaluronic acid is often used  as a key ingredient. Some examples of moisturizer products include Replens, Revaree, and Lubrin but there is not one preferred product.   - RTC 1 year for annual exam    Keri Biswas MD  [facility]

## 2024-10-25 ENCOUNTER — OFFICE VISIT (OUTPATIENT)
Dept: OBGYN | Facility: CLINIC | Age: 69
End: 2024-10-25
Payer: COMMERCIAL

## 2024-10-25 VITALS
WEIGHT: 188 LBS | DIASTOLIC BLOOD PRESSURE: 58 MMHG | HEIGHT: 63 IN | BODY MASS INDEX: 33.31 KG/M2 | SYSTOLIC BLOOD PRESSURE: 112 MMHG

## 2024-10-25 DIAGNOSIS — Z01.419 ENCOUNTER FOR GYNECOLOGICAL EXAMINATION WITHOUT ABNORMAL FINDING: Primary | ICD-10-CM

## 2024-10-25 DIAGNOSIS — N95.2 VAGINAL ATROPHY: ICD-10-CM

## 2024-10-25 PROCEDURE — G0101 CA SCREEN;PELVIC/BREAST EXAM: HCPCS | Performed by: OBSTETRICS & GYNECOLOGY

## 2024-10-25 PROCEDURE — 99203 OFFICE O/P NEW LOW 30 MIN: CPT | Mod: 25 | Performed by: OBSTETRICS & GYNECOLOGY

## 2024-10-25 PROCEDURE — 99459 PELVIC EXAMINATION: CPT | Performed by: OBSTETRICS & GYNECOLOGY

## 2024-10-28 ENCOUNTER — ANCILLARY PROCEDURE (OUTPATIENT)
Dept: ULTRASOUND IMAGING | Facility: CLINIC | Age: 69
End: 2024-10-28
Attending: INTERNAL MEDICINE
Payer: COMMERCIAL

## 2024-10-28 DIAGNOSIS — R10.32 LLQ ABDOMINAL PAIN: ICD-10-CM

## 2024-10-28 PROCEDURE — 76856 US EXAM PELVIC COMPLETE: CPT

## 2024-11-26 ENCOUNTER — OFFICE VISIT (OUTPATIENT)
Dept: OBGYN | Facility: CLINIC | Age: 69
End: 2024-11-26
Payer: COMMERCIAL

## 2024-11-26 VITALS — BODY MASS INDEX: 33.83 KG/M2 | SYSTOLIC BLOOD PRESSURE: 114 MMHG | DIASTOLIC BLOOD PRESSURE: 68 MMHG | WEIGHT: 191 LBS

## 2024-11-26 DIAGNOSIS — Z90.711 HISTORY OF HYSTERECTOMY, SUPRACERVICAL: ICD-10-CM

## 2024-11-26 DIAGNOSIS — R93.89 ABNORMAL ULTRASOUND: Primary | ICD-10-CM

## 2024-11-26 PROCEDURE — 99213 OFFICE O/P EST LOW 20 MIN: CPT | Performed by: OBSTETRICS & GYNECOLOGY

## 2024-11-26 NOTE — PROGRESS NOTES
SUBJECTIVE:                                                   Nanci Vergara is a 69 year old female who presents to clinic today for the following health issue(s):  Patient presents with:  Follow Up: MRI follow up, discuss pelvic MRI from 10/28/2024.      HPI:  Here to discuss recent imaging results. I recently saw the patient on 10/25 for her annual exam. Cervix was normal with no polyps.   Patient had been experiencing some  LLQ abdominal pain so pelvic US ordered. On US, has a noted to be an indeterminent echogenic nodule in the cervix (may be a polyp or nabothian cyst) with echogenic material.   Has a history of supracervical hysterectomy for fibroids.   No new complaints since previous visit and is overall feeling well.     Patient's last menstrual period was 10/29/1998 (exact date)..     Patient is sexually active, .  Using tubal ligation and menopause for contraception.    reports that she has never smoked. She has never been exposed to tobacco smoke. She has never used smokeless tobacco.  STD testing offered?  Declined    Health maintenance updated:  yes    Today's PHQ-2 Score:       10/17/2024     9:24 AM   PHQ-2 (  [employer])   Q1: Little interest or pleasure in doing things 0    Q2: Feeling down, depressed or hopeless 0    PHQ-2 Score 0   Q1: Little interest or pleasure in doing things Not at all   Q2: Feeling down, depressed or hopeless Not at all   PHQ-2 Score 0       Patient-reported     Today's PHQ-9 Score:       2019     3:03 PM   PHQ-9 SCORE   PHQ-9 Total Score MyChart 0   PHQ-9 Total Score 0     Today's DEVONTE-7 Score:       2019     3:04 PM   DEVONTE-7 SCORE   Total Score 0 (minimal anxiety)   Total Score 0       Problem list and histories reviewed & adjusted, as indicated.  Additional history: as documented.    Patient Active Problem List   Diagnosis    Vitamin D deficiency    Arthralgia of temporomandibular joint    Anemia    Hearing loss    Multinodular goiter (nontoxic)     Sciatica    Hyperlipidemia with target LDL less than 130    ACP (advance care planning)    Bursitis, knee, left    Paresthesia of left foot    Chronic pain of left knee    Paresthesias    Bilateral leg edema    Gastroesophageal reflux disease without esophagitis    Exercise-induced asthma    Midline low back pain without sciatica, unspecified chronicity    Osteopenia of multiple sites    Thyroid nodule    Benign essential hypertension     Past Surgical History:   Procedure Laterality Date    COLONOSCOPY      COLONOSCOPY N/A 06/11/2024    Procedure: Colonoscopy;  Surgeon: Thomas Arzate MD;  Location:  GI    GYN SURGERY      PARTIAL HIST    HYSTERECTOMY  08/01/1997    supra cervical abdominal hysterectomy; ovaries remain      Social History     Tobacco Use    Smoking status: Never     Passive exposure: Never    Smokeless tobacco: Never   Substance Use Topics    Alcohol use: No      Problem (# of Occurrences) Relation (Name,Age of Onset)    Diabetes (1) Mother (Mechelle Tatum): NONE    Heart Disease (1) Brother    Multiple myeloma (1) Brother    Pancreatic Cancer (1) Brother: ?    Diabetes Type 2  (1) Brother              Current Outpatient Medications   Medication Sig Dispense Refill    albuterol (PROAIR HFA/PROVENTIL HFA/VENTOLIN HFA) 108 (90 Base) MCG/ACT inhaler INHALE 2 PUFFS INTO THE LUNGS 2 TIMES DAILY AND EVERY 6 HRS AS NEEDED 18 g 11    Cholecalciferol (VITAMIN D) 2000 UNITS tablet Take 2,000 Units by mouth daily 100 tablet 11    hydrochlorothiazide (HYDRODIURIL) 25 MG tablet Take 1 tablet (25 mg) by mouth daily 90 tablet 3     No current facility-administered medications for this visit.     Allergies   Allergen Reactions    Seasonal Allergies        ROS:  12 point review of systems negative other than symptoms noted below or in the HPI.  No urinary frequency or dysuria, bladder or kidney problems      OBJECTIVE:     /68 (BP Location: Right arm, Patient Position: Sitting, Cuff Size: Adult Large)    Wt 86.6 kg (191 lb)   LMP 10/29/1998 (Exact Date)   BMI 33.83 kg/m    Body mass index is 33.83 kg/m .    Exam:  Constitutional:  Appearance: Well nourished, well developed alert, in no acute distress     In-Clinic Test Results:    EXAM: US PELVIC TRANSABDOMINAL AND TRANSVAGINAL  LOCATION: [facility]  DATE: 10/28/2024     INDICATION:  LLQ abdominal pain  COMPARISON: None.  TECHNIQUE: Transabdominal scans were performed. Endovaginal ultrasound was performed to better visualize the adnexa.     FINDINGS:     UTERUS: Supracervical hysterectomy. Within the cervical canal, there is a 7 x 6 x 7 mm echogenic avascular nodule.     RIGHT OVARY: 1.2 x 0.9 x 0.8 cm. Normal.     LEFT OVARY: 1.9 x 0.9 x 0.7 cm. Normal.     No significant free fluid.                                                                      IMPRESSION:  1.  Supracervical hysterectomy. Indeterminate 0.7 cm echogenic nodule within the cervix may be a polyp or nabothian cyst with echogenic material. Recommend further evaluation.      ASSESSMENT/PLAN:                                                        ICD-10-CM    1. Abnormal ultrasound  R93.89 US Transvaginal Pelvic Non-OB      2. History of hysterectomy, supracervical  Z90.711 US Transvaginal Pelvic Non-OB        Here to discuss incidental finding on recent US with history of supracervical hysterectomy with recent normal pelvic exam. Discussed I do not believe this is the cause of any abdominal pain is is most likely a benign finding. We discussed possible etiologies including benign Nabothian cyst or endocervical polyp (also vast majority benign). There are not any concerning features (calcifications, abnormal vascularity, septations) in the nodule. As we have no previous pelvic US to compare to, I recommend repeating imaging in 3 months to reassess and ensure stability (ordered). Patient comfortable with plan.      Keri Biswas MD  [facility]  [place]

## 2025-02-18 ENCOUNTER — OFFICE VISIT (OUTPATIENT)
Dept: OBGYN | Facility: CLINIC | Age: 70
End: 2025-02-18
Attending: OBSTETRICS & GYNECOLOGY
Payer: COMMERCIAL

## 2025-02-18 ENCOUNTER — ANCILLARY PROCEDURE (OUTPATIENT)
Dept: ULTRASOUND IMAGING | Facility: CLINIC | Age: 70
End: 2025-02-18
Attending: OBSTETRICS & GYNECOLOGY
Payer: COMMERCIAL

## 2025-02-18 VITALS — SYSTOLIC BLOOD PRESSURE: 130 MMHG | WEIGHT: 189.2 LBS | DIASTOLIC BLOOD PRESSURE: 68 MMHG | BODY MASS INDEX: 33.52 KG/M2

## 2025-02-18 DIAGNOSIS — R93.89 ABNORMAL ULTRASOUND: ICD-10-CM

## 2025-02-18 DIAGNOSIS — Z90.711 HISTORY OF HYSTERECTOMY, SUPRACERVICAL: ICD-10-CM

## 2025-02-18 DIAGNOSIS — R93.89 ABNORMAL ULTRASOUND: Primary | ICD-10-CM

## 2025-02-18 PROCEDURE — 3078F DIAST BP <80 MM HG: CPT | Performed by: OBSTETRICS & GYNECOLOGY

## 2025-02-18 PROCEDURE — 99459 PELVIC EXAMINATION: CPT | Performed by: OBSTETRICS & GYNECOLOGY

## 2025-02-18 PROCEDURE — 3075F SYST BP GE 130 - 139MM HG: CPT | Performed by: OBSTETRICS & GYNECOLOGY

## 2025-02-18 PROCEDURE — 99212 OFFICE O/P EST SF 10 MIN: CPT | Performed by: OBSTETRICS & GYNECOLOGY

## 2025-02-18 PROCEDURE — 76830 TRANSVAGINAL US NON-OB: CPT | Performed by: OBSTETRICS & GYNECOLOGY

## 2025-02-18 NOTE — PROGRESS NOTES
SUBJECTIVE:                                                   Nanci Vergara is a 69 year old female who presents to clinic today for the following health issue(s):  Patient presents with:  Follow Up: US follow up       Additional information: ***    HPI:  ***    Patient's last menstrual period was 10/29/1998 (exact date)..     Patient is sexually active, .  Using menopause for contraception.    reports that she has never smoked. She has never been exposed to tobacco smoke. She has never used smokeless tobacco.    STD testing offered?  Declined    Health maintenance updated:  yes  Care Gaps  Close care gaps  Overdue          Never done ZOSTER IMMUNIZATION (1 of 2)     Never done RSV VACCINE (1 - Risk 60-74 years 1-dose series)     2022 ASTHMA ACTION PLAN (Yearly)  Last completed: 2021     MAY 24  2023 DTAP/TDAP/TD IMMUNIZATION (2 - Td or Tdap)  Last completed: May 24, 2013     AUG 18  2024 LIPID (Yearly)   Last ordered: Oct 17, 2024     Never done INFLUENZA VACCINE (1)     DEC 4  2024 BMP (Yearly)   Last ordered: Oct 17, 2024     DEC 4  2024 GLUCOSE (Yearly)  Last completed: Dec 4, 2023     ESPERANZA 1  2025 PHQ-2 (once per calendar year) (Yearly, January to December)  Last completed: Oct 17, 2024     Today's PHQ-2 Score:       10/17/2024     9:24 AM   PHQ-2 (  [employer])   Q1: Little interest or pleasure in doing things 0   Q2: Feeling down, depressed or hopeless 0   PHQ-2 Score 0   Q1: Little interest or pleasure in doing things Not at all   Q2: Feeling down, depressed or hopeless Not at all   PHQ-2 Score 0     Today's PHQ-9 Score:       2019     3:03 PM   PHQ-9 SCORE   PHQ-9 Total Score MyChart 0    PHQ-9 Total Score 0       Proxy-reported     Today's DEVONTE-7 Score:       2019     3:04 PM   DEVONTE-7 SCORE   Total Score 0 (minimal anxiety)    Total Score 0       Proxy-reported       Problem list and histories reviewed & adjusted, as indicated.  Additional history: as documented.    Patient  "Active Problem List   Diagnosis    Vitamin D deficiency    Arthralgia of temporomandibular joint    Anemia    Hearing loss    Multinodular goiter (nontoxic)    Sciatica    Hyperlipidemia with target LDL less than 130    ACP (advance care planning)    Bursitis, knee, left    Paresthesia of left foot    Chronic pain of left knee    Paresthesias    Bilateral leg edema    Gastroesophageal reflux disease without esophagitis    Exercise-induced asthma    Midline low back pain without sciatica, unspecified chronicity    Osteopenia of multiple sites    Thyroid nodule    Benign essential hypertension     Past Surgical History:   Procedure Laterality Date    COLONOSCOPY      COLONOSCOPY N/A 06/11/2024    Procedure: Colonoscopy;  Surgeon: Thomas Arzate MD;  Location:  GI    GYN SURGERY      PARTIAL HIST    HYSTERECTOMY  08/01/1997    supra cervical abdominal hysterectomy; ovaries remain      Social History     Tobacco Use    Smoking status: Never     Passive exposure: Never    Smokeless tobacco: Never   Substance Use Topics    Alcohol use: No      Problem (# of Occurrences) Relation (Name,Age of Onset)    Diabetes (1) Mother (Mechelle Tatum): NONE    Heart Disease (1) Brother    Multiple myeloma (1) Brother    Pancreatic Cancer (1) Brother: ?    Diabetes Type 2  (1) Brother              Current Outpatient Medications   Medication Sig Dispense Refill    albuterol (PROAIR HFA/PROVENTIL HFA/VENTOLIN HFA) 108 (90 Base) MCG/ACT inhaler INHALE 2 PUFFS INTO THE LUNGS 2 TIMES DAILY AND EVERY 6 HRS AS NEEDED 18 g 11    Cholecalciferol (VITAMIN D) 2000 UNITS tablet Take 2,000 Units by mouth daily 100 tablet 11    hydrochlorothiazide (HYDRODIURIL) 25 MG tablet Take 1 tablet (25 mg) by mouth daily 90 tablet 3     No current facility-administered medications for this visit.     Allergies   Allergen Reactions    Seasonal Allergies        ROS:  {PLC ROSGYN:189229::\"12 point review of systems negative other than symptoms noted below or " "in the HPI.\"}  {[facility] - :999497}      OBJECTIVE:     /68   Wt 85.8 kg (189 lb 3.2 oz)   LMP 10/29/1998 (Exact Date)   BMI 33.52 kg/m    Body mass index is 33.52 kg/m .    Exam:  {[facility] EXAM CHOICES:202412}     In-Clinic Test Results:  No results found for this or any previous visit (from the past 24 hours).    ASSESSMENT/PLAN:                                                      No diagnosis found.    There are no Patient Instructions on file for this visit.    ***    Keri Biswas MD  [facility]    " Ultrasound Report  Pelvic U/S - Transvaginal  [facility] for [facility]  Referring Provider: Keri Biswas MD   Sonographer:  Katheryn Saldana, Registered Diagnostic Medical Sonographer, [facility]  Indication: Follow up on ?nabothian cyst, hx supracervical hyst, Abnormal ultrasound  LMP: Patient's last menstrual period was 10/29/1998 (exact date).  History:   Gynecological Ultrasonography:   Uterus: absent.   Cervix: Echogenic area with blood flow, thick fluid in cervical canal.  Right Ovary: 1.65 x 1.97 x 1.50 cm. Simple cyst .6 x .5 x .5 cm, calcifications on right ovary.  Left Ovary: 1.18 x .93 x .59 cm. Wnl  Cul de Sac Free Fluid: No free fluid  Technique: Transvaginal Imaging performed     Impression:          The uterus is surgically absent  The cervix has either some thickened tissue or thick fluid in the canal and there is a round, hyperechoic structure within this has blood flow and measures 8 x 6 x 5 mm and may be a polyp but is not diagnostic.  From sagittal views it appears to be fairly distal in the cervix, nearest the external os  Both ovaries are seen and the left is small and normal. The right has a nonconcerning tiny cyst vs calcified antral follicle that is 6 x 5 x 5 mm   No fluid noted in the pelvis     Amelia Barajas MD          ASSESSMENT/PLAN:                                                      Encounter Diagnoses   Name Primary?    Abnormal ultrasound Yes    History of hysterectomy, supracervical      Repeat US today shows stability and still not able to visualize any polyp on pelvic exam. As asymptomatic and no significant change over 3 months with benign appearing findings and hx of supracervical hysterectomy, will continue with expectant management.     Keri Biswas MD  [facility] [place]

## 2025-02-26 ENCOUNTER — NURSE TRIAGE (OUTPATIENT)
Dept: NURSING | Facility: CLINIC | Age: 70
End: 2025-02-26
Payer: COMMERCIAL

## 2025-02-27 ENCOUNTER — TELEPHONE (OUTPATIENT)
Dept: INTERNAL MEDICINE | Facility: CLINIC | Age: 70
End: 2025-02-27
Payer: COMMERCIAL

## 2025-02-27 ENCOUNTER — OFFICE VISIT (OUTPATIENT)
Dept: URGENT CARE | Facility: URGENT CARE | Age: 70
End: 2025-02-27
Payer: COMMERCIAL

## 2025-02-27 ENCOUNTER — ANCILLARY PROCEDURE (OUTPATIENT)
Dept: GENERAL RADIOLOGY | Facility: CLINIC | Age: 70
End: 2025-02-27
Attending: FAMILY MEDICINE
Payer: COMMERCIAL

## 2025-02-27 VITALS
RESPIRATION RATE: 20 BRPM | DIASTOLIC BLOOD PRESSURE: 79 MMHG | WEIGHT: 192 LBS | SYSTOLIC BLOOD PRESSURE: 137 MMHG | BODY MASS INDEX: 34.01 KG/M2 | HEART RATE: 63 BPM | OXYGEN SATURATION: 100 % | TEMPERATURE: 97.7 F

## 2025-02-27 DIAGNOSIS — J18.9 PNEUMONIA OF RIGHT LUNG DUE TO INFECTIOUS ORGANISM, UNSPECIFIED PART OF LUNG: ICD-10-CM

## 2025-02-27 DIAGNOSIS — J06.9 UPPER RESPIRATORY TRACT INFECTION, UNSPECIFIED TYPE: ICD-10-CM

## 2025-02-27 DIAGNOSIS — J02.9 SORE THROAT: Primary | ICD-10-CM

## 2025-02-27 LAB
DEPRECATED S PYO AG THROAT QL EIA: NEGATIVE
S PYO DNA THROAT QL NAA+PROBE: NOT DETECTED

## 2025-02-27 RX ORDER — ALBUTEROL SULFATE 90 UG/1
2 INHALANT RESPIRATORY (INHALATION) EVERY 6 HOURS PRN
Qty: 18 G | Refills: 0 | Status: SHIPPED | OUTPATIENT
Start: 2025-02-27

## 2025-02-27 RX ORDER — DOXYCYCLINE 100 MG/1
100 CAPSULE ORAL 2 TIMES DAILY
Qty: 20 CAPSULE | Refills: 0 | Status: SHIPPED | OUTPATIENT
Start: 2025-02-27 | End: 2025-03-09

## 2025-02-27 RX ORDER — PREDNISONE 20 MG/1
40 TABLET ORAL DAILY
Qty: 10 TABLET | Refills: 0 | Status: SHIPPED | OUTPATIENT
Start: 2025-02-27 | End: 2025-03-04

## 2025-02-27 NOTE — PROGRESS NOTES
Assessment & Plan     Sore throat  neg  - Streptococcus A Rapid Screen w/Reflex to PCR - Clinic Collect  - Group A Streptococcus PCR Throat Swab    Upper respiratory tract infection, unspecified type  CXR with probable RLL pneumonia  - XR Chest 2 Views    Pneumonia of right lung due to infectious organism, unspecified part of lung  Doxy  Proair  prednisone  - doxycycline hyclate (VIBRAMYCIN) 100 MG capsule  Dispense: 20 capsule; Refill: 0  - predniSONE (DELTASONE) 20 MG tablet  Dispense: 10 tablet; Refill: 0  - albuterol (PROAIR HFA/PROVENTIL HFA/VENTOLIN HFA) 108 (90 Base) MCG/ACT inhaler  Dispense: 18 g; Refill: 0             No follow-ups on file.    Rodney Miller MD  [facility] [place] Christine is a 69 year old female who presents to clinic today for the following health issues:  Chief Complaint   Patient presents with    Urgent Care     Cough, productive with thick white and green phlegm, wheezing, sore throat, fatigue  x a week        HPI    Cough and flu for past 1 week  Wheezing  ST  Fatigue for 1 week  Has taken nyquil/dayquil.  Feverish but improved.  No SOB  Some pain in chest.        Review of Systems        Objective    /79   Pulse 63   Temp 97.7  F (36.5  C) (Tympanic)   Resp 20   Wt 87.1 kg (192 lb)   LMP 10/29/1998 (Exact Date)   SpO2 100%   BMI 34.01 kg/m    Physical Exam  Vitals and nursing note reviewed.   Constitutional:       Appearance: Normal appearance.   HENT:      Right Ear: Tympanic membrane and ear canal normal.      Left Ear: Tympanic membrane and ear canal normal.      Mouth/Throat:      Mouth: Mucous membranes are moist.   Eyes:      Pupils: Pupils are equal, round, and reactive to light.   Cardiovascular:      Rate and Rhythm: Normal rate and regular rhythm.      Pulses: Normal pulses.      Heart sounds: Normal heart sounds.   Pulmonary:      Breath sounds: Wheezing and rhonchi present.   Musculoskeletal:      Cervical back: Neck  supple.   Neurological:      Mental Status: She is alert.

## 2025-02-27 NOTE — TELEPHONE ENCOUNTER
Patient recommended to . Triage completed last night see nurse triage note. Patient still having the same symptoms. Patient agreeable to being seen at .     ANTHONY Tapia  [facility] Triage

## 2025-02-27 NOTE — TELEPHONE ENCOUNTER
"Nurse Triage SBAR    Is this a 2nd Level Triage? YES, LICENSED PRACTITIONER REVIEW IS REQUIRED    Situation: Cough    Background: Patient calls with reports of having a cold for 1 week now. States started with a sore throat, headache, chills. States has had chills for maybe a minute or so, but not a significant timeframe. Patient reports has not taken her temperature and does not have a thermometer. Reports feeling like it has been something (symptom) different each day. Reports post nasal drainage, sore throat, and cough. States cough for last three days. States feels better during in the day compared to the night. Coughing more at night. \"Still a little rattle in my chest.\" Still coughing up mucous- clear \"can be kind of white-fausto.\" States other day was greenish in color but unsure if it was due to something she ate. Reports all day sputum production about a teaspoon to tablespoon with each coughing episode. Chest discomfort with coughing episodes. \"Chest is a little tight.\"  States also may due to having some anxiety. Hx of asthma. Reports taking Nyquil at night and sometimes Dayquil- both effective per patient.     Assessment: Wheezing per patient. \"Whistling\" sound when she is very quiet. Chest discomfort \"more on the left\" middle top part of breast area- 4/10 pain, patient states thinks is due to coughing. Sore throat. Productive cough. Post nasal drip.    Protocol Recommended Disposition:   See HCP Within 4 Hours (Or PCP Triage)    Recommendation:   Per on call provider, advised patient to call clinic in AM and discuss with care team if she should be seen or not.    Paged to provider    Does the patient meet one of the following criteria for ADS visit consideration? 16+ years old, with an [facility] PCP     TIP  Providers, please consider if this condition is appropriate for management at one of our Acute and Diagnostic Services sites.     If patient is a good candidate, please use dotphrase <dot>triageresponse " and select Refer to ADS to document.      Provider consult indicated.     Reason for page: 2LT    Page sent to Dr. Cory Potts by RN at 2134.     Rosa Isela Prasad RN       Provider, Dr. Cory Potts, returning page to [facility]visors at [phone]    Provider recommended plan of care: Have patient call clinic in AM and discuss with care team if she should be seen or not.    Provider Recommendation Follow Up:   Reached patient/caregiver. Informed of provider's recommendations. Patient verbalized understanding and agrees with the plan.         Rosa Isela Prasad RN    Reason for Disposition   Wheezing is present    Additional Information   Negative: SEVERE difficulty breathing (e.g., struggling for each breath, speaks in single words)   Negative: [1] Difficulty breathing AND [2] exposure to flames, smoke, or fumes   Negative: [1] Stridor AND [2] difficulty breathing   Negative: Bluish (or gray) lips or face now   Negative: Sounds like a life-threatening emergency to the triager   Negative: Dry cough (non-productive;  no sputum or minimal clear sputum)   Negative: [1] Previous asthma attacks AND [2] this feels like asthma attack   Negative: [1] MODERATE difficulty breathing (e.g., speaks in phrases, SOB even at rest, pulse 100-120) AND [2] still present when not coughing   Negative: Chest pain  (Exception: MILD central chest pain, present only when coughing.)   Negative: Patient sounds very sick or weak to the triager   Negative: [1] MILD difficulty breathing (e.g., minimal/no SOB at rest, SOB with walking, pulse <100) AND [2] still present when not coughing   Negative: [1] Coughed up blood AND [2] > 1 tablespoon (15 ml)   (Exception: Blood-tinged sputum.)   Negative: [1] Fever > 100.0 F (37.8 C) AND [2] diabetes mellitus or weak immune system (e.g., HIV positive, cancer chemo, splenectomy, organ transplant, chronic steroids)   Negative: [1] Fever > 101 F (38.3 C) AND [2] age > 60 years   Negative: [1] Fever > 100.0 F (37.8 C) AND  [2] bedridden (e.g., CVA, chronic illness, recovering from surgery)   Negative: Fever > 103 F (39.4 C)    Protocols used: Cough - Acute Erlhvsfylw-B-HE

## 2025-03-21 DIAGNOSIS — J45.990 EXERCISE-INDUCED ASTHMA: ICD-10-CM

## 2025-03-24 RX ORDER — ALBUTEROL SULFATE 90 UG/1
INHALANT RESPIRATORY (INHALATION)
Qty: 18 G | Refills: 3 | Status: SHIPPED | OUTPATIENT
Start: 2025-03-24

## 2025-03-27 DIAGNOSIS — I10 BENIGN ESSENTIAL HYPERTENSION: ICD-10-CM

## 2025-03-27 RX ORDER — HYDROCHLOROTHIAZIDE 25 MG/1
25 TABLET ORAL DAILY
Qty: 90 TABLET | Refills: 1 | Status: SHIPPED | OUTPATIENT
Start: 2025-03-27

## 2025-04-01 ENCOUNTER — LAB (OUTPATIENT)
Dept: LAB | Facility: CLINIC | Age: 70
End: 2025-04-01
Payer: COMMERCIAL

## 2025-04-01 DIAGNOSIS — E55.9 VITAMIN D DEFICIENCY: ICD-10-CM

## 2025-04-01 DIAGNOSIS — E78.5 HYPERLIPIDEMIA WITH TARGET LDL LESS THAN 130: ICD-10-CM

## 2025-04-01 DIAGNOSIS — I10 BENIGN ESSENTIAL HYPERTENSION: ICD-10-CM

## 2025-04-01 DIAGNOSIS — R10.32 LLQ ABDOMINAL PAIN: ICD-10-CM

## 2025-04-01 LAB
ANION GAP SERPL CALCULATED.3IONS-SCNC: 8 MMOL/L (ref 7–15)
BUN SERPL-MCNC: 10.7 MG/DL (ref 8–23)
CALCIUM SERPL-MCNC: 9.5 MG/DL (ref 8.8–10.4)
CHLORIDE SERPL-SCNC: 102 MMOL/L (ref 98–107)
CHOLEST SERPL-MCNC: 202 MG/DL
CREAT SERPL-MCNC: 0.95 MG/DL (ref 0.51–0.95)
EGFRCR SERPLBLD CKD-EPI 2021: 64 ML/MIN/1.73M2
ERYTHROCYTE [DISTWIDTH] IN BLOOD BY AUTOMATED COUNT: 12.9 % (ref 10–15)
FASTING STATUS PATIENT QL REPORTED: YES
FASTING STATUS PATIENT QL REPORTED: YES
GLUCOSE SERPL-MCNC: 99 MG/DL (ref 70–99)
HCO3 SERPL-SCNC: 29 MMOL/L (ref 22–29)
HCT VFR BLD AUTO: 38.1 % (ref 35–47)
HDLC SERPL-MCNC: 50 MG/DL
HGB BLD-MCNC: 12.5 G/DL (ref 11.7–15.7)
LDLC SERPL CALC-MCNC: 130 MG/DL
MCH RBC QN AUTO: 30.6 PG (ref 26.5–33)
MCHC RBC AUTO-ENTMCNC: 32.8 G/DL (ref 31.5–36.5)
MCV RBC AUTO: 93 FL (ref 78–100)
NONHDLC SERPL-MCNC: 152 MG/DL
PLATELET # BLD AUTO: 262 10E3/UL (ref 150–450)
POTASSIUM SERPL-SCNC: 4 MMOL/L (ref 3.4–5.3)
RBC # BLD AUTO: 4.08 10E6/UL (ref 3.8–5.2)
SODIUM SERPL-SCNC: 139 MMOL/L (ref 135–145)
TRIGL SERPL-MCNC: 111 MG/DL
VIT D+METAB SERPL-MCNC: 24 NG/ML (ref 20–50)
WBC # BLD AUTO: 5.6 10E3/UL (ref 4–11)

## 2025-04-01 PROCEDURE — 85027 COMPLETE CBC AUTOMATED: CPT

## 2025-04-01 PROCEDURE — 80061 LIPID PANEL: CPT

## 2025-04-01 PROCEDURE — 82306 VITAMIN D 25 HYDROXY: CPT

## 2025-04-01 PROCEDURE — 80048 BASIC METABOLIC PNL TOTAL CA: CPT

## 2025-04-01 PROCEDURE — 36415 COLL VENOUS BLD VENIPUNCTURE: CPT

## 2025-04-07 ENCOUNTER — TELEPHONE (OUTPATIENT)
Dept: INTERNAL MEDICINE | Facility: CLINIC | Age: 70
End: 2025-04-07
Payer: COMMERCIAL

## 2025-04-07 NOTE — TELEPHONE ENCOUNTER
Called and spoke with pt. Relayed provider message from below. Pt stated 4/16/25 is the date that will work for her to discuss meds.     Pt is also asking when she is due for her mammogram? Per health maintenance- pt is due 7/29/26. Pt stated she wants her mammo done every year. Ok for pt to get it done yearly?     Routing to PCP to review and advise.     Please call pt back with PCPs recommendations.

## 2025-04-07 NOTE — TELEPHONE ENCOUNTER
----- Message from Adriana PIKE sent at 4/7/2025  8:05 AM CDT -----    ----- Message -----  From: Everette Way MD  Sent: 4/6/2025  10:41 AM CDT  To: Adriana Joe CMA    Call- see lipid recommendations.  Rec'd quick tele/video appt - 8 am ok- to discuss medication if she is interested        Using the new American Heart Association risk calculator your 10 yr risk of global cardiovascular disease (heart attack, stroke) is 14%.  At this level of risk reducing the cholesterol with a statin can lower future heart disease.   We should discuss starting medication.     At any risk level > 7.5-10% we should consider the use of a statin cholesterol lowering medication.  This has been shown to help prevent heart disease and strokes when risk is at or above this level.   Written by Everette Way MD on 4/6/2025 10:40 AM CDT     Per MA- appt has to be Mon, Wed, or Fri and has to be telephone visit.

## 2025-04-16 ENCOUNTER — VIRTUAL VISIT (OUTPATIENT)
Dept: INTERNAL MEDICINE | Facility: CLINIC | Age: 70
End: 2025-04-16
Payer: COMMERCIAL

## 2025-04-16 DIAGNOSIS — E78.2 MIXED HYPERLIPIDEMIA: Primary | ICD-10-CM

## 2025-04-16 DIAGNOSIS — Z12.31 BREAST CANCER SCREENING BY MAMMOGRAM: ICD-10-CM

## 2025-04-16 DIAGNOSIS — J45.990 EXERCISE-INDUCED ASTHMA: ICD-10-CM

## 2025-04-16 PROCEDURE — 98014 SYNCH AUDIO-ONLY EST MOD 30: CPT | Performed by: INTERNAL MEDICINE

## 2025-04-16 RX ORDER — ALBUTEROL SULFATE 90 UG/1
INHALANT RESPIRATORY (INHALATION)
Qty: 18 G | Refills: 11 | Status: SHIPPED | OUTPATIENT
Start: 2025-04-16

## 2025-04-16 RX ORDER — SIMVASTATIN 20 MG
20 TABLET ORAL AT BEDTIME
Qty: 90 TABLET | Refills: 3 | Status: SHIPPED | OUTPATIENT
Start: 2025-04-16

## 2025-04-16 ASSESSMENT — ASTHMA QUESTIONNAIRES
ACT_TOTALSCORE: 24
QUESTION_1 LAST FOUR WEEKS HOW MUCH OF THE TIME DID YOUR ASTHMA KEEP YOU FROM GETTING AS MUCH DONE AT WORK, SCHOOL OR AT HOME: NONE OF THE TIME
ACT_TOTALSCORE: 24
QUESTION_5 LAST FOUR WEEKS HOW WOULD YOU RATE YOUR ASTHMA CONTROL: WELL CONTROLLED
QUESTION_4 LAST FOUR WEEKS HOW OFTEN HAVE YOU USED YOUR RESCUE INHALER OR NEBULIZER MEDICATION (SUCH AS ALBUTEROL): NOT AT ALL
QUESTION_3 LAST FOUR WEEKS HOW OFTEN DID YOUR ASTHMA SYMPTOMS (WHEEZING, COUGHING, SHORTNESS OF BREATH, CHEST TIGHTNESS OR PAIN) WAKE YOU UP AT NIGHT OR EARLIER THAN USUAL IN THE MORNING: NOT AT ALL
QUESTION_2 LAST FOUR WEEKS HOW OFTEN HAVE YOU HAD SHORTNESS OF BREATH: NOT AT ALL

## 2025-04-16 NOTE — PROGRESS NOTES
"Nanci is a 70 year old who is being evaluated via a billable telephone visit.    What phone number would you like to be contacted at? [phone]  How would you like to obtain your AVS? MyChart  Originating Location (pt. Location): Home    Distant Location (provider location):  On-site  Telephone visit completed due to the patient did not consent to a video visit.    Assessment & Plan     Mixed hyperlipidemia  Start statin for primary prevention   - Lipid panel reflex to direct LDL Fasting; Future  - simvastatin (ZOCOR) 20 MG tablet; Take 1 tablet (20 mg) by mouth at bedtime.    Exercise-induced asthma  - albuterol (PROAIR HFA/PROVENTIL HFA/VENTOLIN HFA) 108 (90 Base) MCG/ACT inhaler; INHALE 2 PUFFS INTO THE LUNGS 2 TIMES DAILY AND EVERY 6 HRS AS NEEDED    Breast cancer screening by mammogram  - MA Screen Bilateral w/Marcos; Future    The longitudinal plan of care for the diagnosis(es)/condition(s) as documented were addressed during this visit. Due to the added complexity in care, I will continue to support Nanci in the subsequent management and with ongoing continuity of care.      BMI  Estimated body mass index is 34.01 kg/m  as calculated from the following:    Height as of 10/25/24: 1.6 m (5' 3\").    Weight as of 2/27/25: 87.1 kg (192 lb).         Follow-up       Subjective   Nanci is a 70 year old, presenting for the following health issues:  Recheck Medication  Go over blood work     HPI                  Objective           Vitals:  No vitals were obtained today due to virtual visit.    Physical Exam   General: Alert and no distress //Respiratory: No audible wheeze, cough, or shortness of breath // Psychiatric:  Appropriate affect, tone, and pace of words            Phone call duration: 24 minutes  Signed Electronically by: Everette Way MD    "

## 2025-05-12 ENCOUNTER — OFFICE VISIT (OUTPATIENT)
Dept: INTERNAL MEDICINE | Facility: CLINIC | Age: 70
End: 2025-05-12
Payer: COMMERCIAL

## 2025-05-12 VITALS
DIASTOLIC BLOOD PRESSURE: 80 MMHG | OXYGEN SATURATION: 99 % | HEIGHT: 63 IN | WEIGHT: 189.6 LBS | SYSTOLIC BLOOD PRESSURE: 117 MMHG | TEMPERATURE: 97.6 F | RESPIRATION RATE: 16 BRPM | BODY MASS INDEX: 33.59 KG/M2 | HEART RATE: 71 BPM

## 2025-05-12 DIAGNOSIS — M70.52 PES ANSERINUS BURSITIS OF LEFT KNEE: Primary | ICD-10-CM

## 2025-05-12 PROCEDURE — 3079F DIAST BP 80-89 MM HG: CPT | Performed by: INTERNAL MEDICINE

## 2025-05-12 PROCEDURE — 3074F SYST BP LT 130 MM HG: CPT | Performed by: INTERNAL MEDICINE

## 2025-05-12 PROCEDURE — 99213 OFFICE O/P EST LOW 20 MIN: CPT | Performed by: INTERNAL MEDICINE

## 2025-05-12 NOTE — PROGRESS NOTES
"  Assessment & Plan     Pes anserinus bursitis of left knee  Medial pain seems to be at pes anserine bursa  Try nsaids, stretching exercises. If no better see sports medicine        Subjective   Nanci is a 70 year old, presenting for the following health issues:  Knee Pain    Knee Pain    History of Present Illness       Reason for visit:  Knee pain  Symptom onset:  3-7 days ago  Symptoms include:  Knee pain  Symptom intensity:  Moderate  Symptom progression:  Worsening  Had these symptoms before:  No  What makes it worse:  Laying down  What makes it better:  No She is missing 1 dose(s) of medications per week.  She is not taking prescribed medications regularly due to remembering to take.                      Objective    /80   Pulse 71   Temp 97.6  F (36.4  C) (Temporal)   Resp 16   Ht 1.6 m (5' 3\")   Wt 86 kg (189 lb 9.6 oz)   LMP 10/29/1998 (Exact Date)   SpO2 99%   BMI 33.59 kg/m    Body mass index is 33.59 kg/m .  Physical Exam   GENERAL: alert and no distress  MS: normal ROM both knees. Tenderness at the pes anserine bursa inferior medial aspect of knee.             Signed Electronically by: Everette Way MD    "

## 2025-07-31 ENCOUNTER — ANCILLARY PROCEDURE (OUTPATIENT)
Dept: MAMMOGRAPHY | Facility: CLINIC | Age: 70
End: 2025-07-31
Attending: INTERNAL MEDICINE
Payer: COMMERCIAL

## 2025-07-31 DIAGNOSIS — Z12.31 BREAST CANCER SCREENING BY MAMMOGRAM: ICD-10-CM

## 2025-07-31 PROCEDURE — 77063 BREAST TOMOSYNTHESIS BI: CPT | Mod: TC | Performed by: RADIOLOGY

## 2025-07-31 PROCEDURE — 77067 SCR MAMMO BI INCL CAD: CPT | Mod: TC | Performed by: RADIOLOGY

## 2025-08-01 ENCOUNTER — TELEPHONE (OUTPATIENT)
Dept: INTERNAL MEDICINE | Facility: CLINIC | Age: 70
End: 2025-08-01
Payer: COMMERCIAL

## 2025-08-01 DIAGNOSIS — M25.562 CHRONIC PAIN OF LEFT KNEE: Primary | ICD-10-CM

## 2025-08-01 DIAGNOSIS — G89.29 CHRONIC PAIN OF LEFT KNEE: Primary | ICD-10-CM

## 2025-08-05 ENCOUNTER — PATIENT OUTREACH (OUTPATIENT)
Dept: CARE COORDINATION | Facility: CLINIC | Age: 70
End: 2025-08-05
Payer: COMMERCIAL

## 2025-08-07 ENCOUNTER — PATIENT OUTREACH (OUTPATIENT)
Dept: CARE COORDINATION | Facility: CLINIC | Age: 70
End: 2025-08-07
Payer: COMMERCIAL

## (undated) DEVICE — DECANTER BAG 2002S

## (undated) DEVICE — SOL WATER IRRIG 1000ML BOTTLE 2F7114

## (undated) RX ORDER — FENTANYL CITRATE 50 UG/ML
INJECTION, SOLUTION INTRAMUSCULAR; INTRAVENOUS
Status: DISPENSED
Start: 2024-06-11